# Patient Record
Sex: MALE | Race: ASIAN | Employment: UNEMPLOYED | URBAN - METROPOLITAN AREA
[De-identification: names, ages, dates, MRNs, and addresses within clinical notes are randomized per-mention and may not be internally consistent; named-entity substitution may affect disease eponyms.]

---

## 2017-11-27 ENCOUNTER — HOSPITAL ENCOUNTER (EMERGENCY)
Age: 57
Discharge: HOME | End: 2017-11-27
Attending: EMERGENCY MEDICINE
Payer: MEDICARE

## 2017-11-27 ENCOUNTER — APPOINTMENT (OUTPATIENT)
Dept: CT IMAGING | Age: 57
End: 2017-11-27
Attending: EMERGENCY MEDICINE
Payer: MEDICARE

## 2017-11-27 ENCOUNTER — APPOINTMENT (OUTPATIENT)
Dept: GENERAL RADIOLOGY | Age: 57
End: 2017-11-27
Attending: EMERGENCY MEDICINE
Payer: MEDICARE

## 2017-11-27 VITALS
RESPIRATION RATE: 26 BRPM | HEART RATE: 95 BPM | SYSTOLIC BLOOD PRESSURE: 115 MMHG | DIASTOLIC BLOOD PRESSURE: 95 MMHG | OXYGEN SATURATION: 95 % | TEMPERATURE: 97.8 F | BODY MASS INDEX: 29.59 KG/M2 | WEIGHT: 167 LBS | HEIGHT: 63 IN

## 2017-11-27 DIAGNOSIS — R06.02 SOB (SHORTNESS OF BREATH): Primary | ICD-10-CM

## 2017-11-27 LAB
ANION GAP SERPL CALC-SCNC: 12 MMOL/L (ref 3–18)
BASOPHILS # BLD: 0 K/UL (ref 0–0.06)
BASOPHILS NFR BLD: 0 % (ref 0–2)
BNP SERPL-MCNC: 4476 PG/ML (ref 0–900)
BUN SERPL-MCNC: 82 MG/DL (ref 7–18)
BUN/CREAT SERPL: 5 (ref 12–20)
CALCIUM SERPL-MCNC: 7.7 MG/DL (ref 8.5–10.1)
CHLORIDE SERPL-SCNC: 96 MMOL/L (ref 100–108)
CK MB CFR SERPL CALC: 0.5 % (ref 0–4)
CK MB SERPL-MCNC: 1.2 NG/ML (ref 5–25)
CK SERPL-CCNC: 235 U/L (ref 39–308)
CO2 SERPL-SCNC: 29 MMOL/L (ref 21–32)
CREAT SERPL-MCNC: 16.5 MG/DL (ref 0.6–1.3)
D DIMER PPP FEU-MCNC: 0.61 UG/ML(FEU)
DIFFERENTIAL METHOD BLD: ABNORMAL
EOSINOPHIL # BLD: 0.3 K/UL (ref 0–0.4)
EOSINOPHIL NFR BLD: 6 % (ref 0–5)
ERYTHROCYTE [DISTWIDTH] IN BLOOD BY AUTOMATED COUNT: 14.8 % (ref 11.6–14.5)
GLUCOSE SERPL-MCNC: 123 MG/DL (ref 74–99)
HCT VFR BLD AUTO: 30.1 % (ref 36–48)
HGB BLD-MCNC: 9.9 G/DL (ref 13–16)
LYMPHOCYTES # BLD: 1.5 K/UL (ref 0.9–3.6)
LYMPHOCYTES NFR BLD: 25 % (ref 21–52)
MCH RBC QN AUTO: 29.3 PG (ref 24–34)
MCHC RBC AUTO-ENTMCNC: 32.9 G/DL (ref 31–37)
MCV RBC AUTO: 89.1 FL (ref 74–97)
MONOCYTES # BLD: 0.4 K/UL (ref 0.05–1.2)
MONOCYTES NFR BLD: 7 % (ref 3–10)
NEUTS SEG # BLD: 3.7 K/UL (ref 1.8–8)
NEUTS SEG NFR BLD: 62 % (ref 40–73)
PLATELET # BLD AUTO: 149 K/UL (ref 135–420)
PMV BLD AUTO: 11.8 FL (ref 9.2–11.8)
POTASSIUM SERPL-SCNC: 5.1 MMOL/L (ref 3.5–5.5)
RBC # BLD AUTO: 3.38 M/UL (ref 4.7–5.5)
SODIUM SERPL-SCNC: 137 MMOL/L (ref 136–145)
TROPONIN I SERPL-MCNC: 0.07 NG/ML (ref 0–0.04)
WBC # BLD AUTO: 6 K/UL (ref 4.6–13.2)

## 2017-11-27 PROCEDURE — 99285 EMERGENCY DEPT VISIT HI MDM: CPT

## 2017-11-27 PROCEDURE — 71275 CT ANGIOGRAPHY CHEST: CPT

## 2017-11-27 PROCEDURE — 85025 COMPLETE CBC W/AUTO DIFF WBC: CPT | Performed by: EMERGENCY MEDICINE

## 2017-11-27 PROCEDURE — 93005 ELECTROCARDIOGRAM TRACING: CPT

## 2017-11-27 PROCEDURE — 71010 XR CHEST SNGL V: CPT

## 2017-11-27 PROCEDURE — 85379 FIBRIN DEGRADATION QUANT: CPT | Performed by: EMERGENCY MEDICINE

## 2017-11-27 PROCEDURE — 74011636320 HC RX REV CODE- 636/320: Performed by: EMERGENCY MEDICINE

## 2017-11-27 PROCEDURE — 96374 THER/PROPH/DIAG INJ IV PUSH: CPT

## 2017-11-27 PROCEDURE — 83880 ASSAY OF NATRIURETIC PEPTIDE: CPT | Performed by: EMERGENCY MEDICINE

## 2017-11-27 PROCEDURE — 74011000258 HC RX REV CODE- 258: Performed by: EMERGENCY MEDICINE

## 2017-11-27 PROCEDURE — 82550 ASSAY OF CK (CPK): CPT | Performed by: EMERGENCY MEDICINE

## 2017-11-27 PROCEDURE — 80048 BASIC METABOLIC PNL TOTAL CA: CPT | Performed by: EMERGENCY MEDICINE

## 2017-11-27 PROCEDURE — 74011250636 HC RX REV CODE- 250/636: Performed by: EMERGENCY MEDICINE

## 2017-11-27 RX ORDER — FUROSEMIDE 10 MG/ML
40 INJECTION INTRAMUSCULAR; INTRAVENOUS
Status: COMPLETED | OUTPATIENT
Start: 2017-11-27 | End: 2017-11-27

## 2017-11-27 RX ORDER — SODIUM CHLORIDE 9 MG/ML
100 INJECTION, SOLUTION INTRAVENOUS ONCE
Status: COMPLETED | OUTPATIENT
Start: 2017-11-27 | End: 2017-11-27

## 2017-11-27 RX ADMIN — IOPAMIDOL 70 ML: 755 INJECTION, SOLUTION INTRAVENOUS at 18:59

## 2017-11-27 RX ADMIN — SODIUM CHLORIDE 94 ML: 900 INJECTION, SOLUTION INTRAVENOUS at 18:59

## 2017-11-27 RX ADMIN — FUROSEMIDE 40 MG: 10 INJECTION, SOLUTION INTRAMUSCULAR; INTRAVENOUS at 20:15

## 2017-11-27 NOTE — ED PROVIDER NOTES
EMERGENCY DEPARTMENT HISTORY AND PHYSICAL EXAM    5:13 PM      Date: 11/27/2017  Patient Name: Merary Fragoso    History of Presenting Illness     Chief Complaint   Patient presents with    Chest Pain         History Provided By: Patient and translater    Chief Complaint: SOB  Duration:  Days  Timing:  Constant  Location: chest  Quality: Tightness  Severity: Mild  Modifying Factors: aggravated by deep breaths  Associated Symptoms: mild chest pain  HPI limited to language barrier, translater used      Additional History (Context): Merary Fragoso is a 62 y.o. male with diabetes, hypertension, stroke and CKD who presents with SOB. PT states he had a gradual onset of difficulty breathing starting at noon today with a little bit of chest pain. Deep breaths makes the SOB worse. PT had one prior episode of this and was seen at [facility] ED, pt unable to give answer if a cause for past episode was found. Pt has had 4 CVA's. PCP: Ashlee Jean Baptiste MD    Current Outpatient Prescriptions   Medication Sig Dispense Refill    oxyCODONE-acetaminophen (PERCOCET) 7.5-325 mg per tablet Take 1 Tab by mouth every four (4) hours as needed for Pain. Max Daily Amount: 6 Tabs. 40 Tab 0    chlorproMAZINE (THORAZINE) 10 mg tablet Take 10 mg by mouth three (3) times daily.  glimepiride (AMARYL) 1 mg tablet Take 1 mg by mouth Daily (before breakfast).  isosorbide dinitrate (ISORDIL) 30 mg tablet Take 30 mg by mouth daily.  nitroglycerin (NITROSTAT) 0.4 mg SL tablet by SubLINGual route every five (5) minutes as needed for Chest Pain.  sevelamer carbonate (RENVELA) 800 mg tab tab Take 800 mg by mouth three (3) times daily.  SENNOSIDES (SENNA LAX PO) Take  by mouth as needed.  linagliptin (TRADJENTA) 5 mg tablet Take 5 mg by mouth daily. Indications: type 2 diabetes mellitus      cholecalciferol, vitamin D3, (VITAMIN D3) 2,000 unit tab Take  by mouth daily.       amiodarone (CORDARONE) 200 mg tablet Take 200 mg by mouth daily.  warfarin (COUMADIN) 5 mg tablet Take 1 Tab by mouth daily. 30 Tab 11    metformin (GLUCOPHAGE) 1,000 mg tablet Take 1,000 mg by mouth two (2) times daily (with meals).  amlodipine-benazepril (LOTREL) 5-20 mg per capsule Take 1 Cap by mouth daily.  simvastatin (ZOCOR) 40 mg tablet Take  by mouth nightly.  metoprolol-XL (TOPROL XL) 100 mg XL tablet Take  by mouth daily.  aspirin delayed-release 81 mg tablet Take 81 mg by mouth daily.  fenofibrate nanocrystallized (TRICOR) 145 mg tablet Take  by mouth daily. Past History     Past Medical History:  Past Medical History:   Diagnosis Date    CAD (coronary artery disease)     Chronic kidney disease     Chronic pain     back    Diabetes ([facility] [address]     Hypercholesterolemia     Hypertension     Stroke [facility])        Past Surgical History:  Past Surgical History:   Procedure Laterality Date    CARDIAC SURG PROCEDURE UNLIST      angioplasty    HX GI  3-2008    EGD Normal    HX VASCULAR ACCESS      AV fistula       Family History:  Family History   Problem Relation Age of Onset    Heart Disease Mother     Hypertension Sister     Hypertension Brother        Social History:  Social History   Substance Use Topics    Smoking status: Current Every Day Smoker     Packs/day: 1.00     Years: 39.00    Smokeless tobacco: Never Used    Alcohol use No       Allergies:  No Known Allergies      Review of Systems       Review of Systems   Constitutional: Negative for chills and fever. HENT: Negative for rhinorrhea. Respiratory: Positive for shortness of breath. Cardiovascular: Positive for chest pain (mild). Gastrointestinal: Negative for abdominal pain, nausea and vomiting. Endocrine: Negative for polyuria. Genitourinary: Negative for dysuria. Musculoskeletal: Negative for back pain. Skin: Negative for rash. Neurological: Negative for headaches.    All other systems reviewed and are negative. Physical Exam     Visit Vitals    BP (!) 115/95    Pulse 95    Temp 97.8 °F (36.6 °C)    Resp 26    Ht 5' 3\" (1.6 m)    Wt 75.8 kg (167 lb)    SpO2 95%    BMI 29.58 kg/m2         Physical Exam   Constitutional: He is oriented to person, place, and time. He appears well-developed and well-nourished. Speaking in full sentences   HENT:   Head: Normocephalic and atraumatic. Eyes: Conjunctivae are normal. Pupils are equal, round, and reactive to light. Neck: Normal range of motion. Neck supple. Cardiovascular: Normal rate and regular rhythm. AV fistula left arm, palpable thrill   Pulmonary/Chest: Effort normal and breath sounds normal. No respiratory distress. He has no wheezes. Abdominal: Soft. Bowel sounds are normal. He exhibits no distension. There is no tenderness. There is no rebound and no guarding. Musculoskeletal: Normal range of motion. Neurological: He is alert and oriented to person, place, and time. Skin: Skin is warm and dry. Psychiatric: He has a normal mood and affect. Thought content normal.   Nursing note and vitals reviewed.         Diagnostic Study Results     Labs -  Recent Results (from the past 12 hour(s))   EKG, 12 LEAD, INITIAL    Collection Time: 11/27/17  4:42 PM   Result Value Ref Range    Ventricular Rate 95 BPM    Atrial Rate 95 BPM    P-R Interval 180 ms    QRS Duration 116 ms    Q-T Interval 386 ms    QTC Calculation (Bezet) 485 ms    Calculated P Axis 58 degrees    Calculated R Axis 16 degrees    Calculated T Axis 158 degrees    Diagnosis       Normal sinus rhythm  Possible Left atrial enlargement  Left ventricular hypertrophy with QRS widening and repolarization abnormality  Inferior infarct , age undetermined  Abnormal ECG  No previous ECGs available     CBC WITH AUTOMATED DIFF    Collection Time: 11/27/17  5:00 PM   Result Value Ref Range    WBC 6.0 4.6 - 13.2 K/uL    RBC 3.38 (L) 4.70 - 5.50 M/uL    HGB 9.9 (L) 13.0 - 16.0 g/dL    HCT 30.1 (L) 36.0 - 48.0 %    MCV 89.1 74.0 - 97.0 FL    MCH 29.3 24.0 - 34.0 PG    MCHC 32.9 31.0 - 37.0 g/dL    RDW 14.8 (H) 11.6 - 14.5 %    PLATELET 040 779 - 405 K/uL    MPV 11.8 9.2 - 11.8 FL    NEUTROPHILS 62 40 - 73 %    LYMPHOCYTES 25 21 - 52 %    MONOCYTES 7 3 - 10 %    EOSINOPHILS 6 (H) 0 - 5 %    BASOPHILS 0 0 - 2 %    ABS. NEUTROPHILS 3.7 1.8 - 8.0 K/UL    ABS. LYMPHOCYTES 1.5 0.9 - 3.6 K/UL    ABS. MONOCYTES 0.4 0.05 - 1.2 K/UL    ABS. EOSINOPHILS 0.3 0.0 - 0.4 K/UL    ABS.  BASOPHILS 0.0 0.0 - 0.06 K/UL    DF AUTOMATED     METABOLIC PANEL, BASIC    Collection Time: 11/27/17  5:00 PM   Result Value Ref Range    Sodium 137 136 - 145 mmol/L    Potassium 5.1 3.5 - 5.5 mmol/L    Chloride 96 (L) 100 - 108 mmol/L    CO2 29 21 - 32 mmol/L    Anion gap 12 3.0 - 18 mmol/L    Glucose 123 (H) 74 - 99 mg/dL    BUN 82 (H) 7.0 - 18 MG/DL    Creatinine 16.50 (H) 0.6 - 1.3 MG/DL    BUN/Creatinine ratio 5 (L) 12 - 20      GFR est AA 4 (L) >60 ml/min/1.73m2    GFR est non-AA 3 (L) >60 ml/min/1.73m2    Calcium 7.7 (L) 8.5 - 10.1 MG/DL   CARDIAC PANEL,(CK, CKMB & TROPONIN)    Collection Time: 11/27/17  5:00 PM   Result Value Ref Range     39 - 308 U/L    CK - MB 1.2 <3.6 ng/ml    CK-MB Index 0.5 0.0 - 4.0 %    Troponin-I, Qt. 0.07 (H) 0.0 - 0.045 NG/ML   NT-PRO BNP    Collection Time: 11/27/17  5:00 PM   Result Value Ref Range    NT pro-BNP 4476 (H) 0 - 900 PG/ML   D DIMER    Collection Time: 11/27/17  5:00 PM   Result Value Ref Range    D DIMER 0.61 (H) <0.46 ug/ml(FEU)   EKG, 12 LEAD, SUBSEQUENT    Collection Time: 11/27/17  8:36 PM   Result Value Ref Range    Ventricular Rate 90 BPM    Atrial Rate 90 BPM    P-R Interval 176 ms    QRS Duration 116 ms    Q-T Interval 390 ms    QTC Calculation (Bezet) 477 ms    Calculated P Axis 57 degrees    Calculated R Axis 29 degrees    Calculated T Axis 160 degrees    Diagnosis       Normal sinus rhythm  Possible Left atrial enlargement  Inferior infarct (cited on or before 27-NOV-2017)  ST & T wave abnormality, consider lateral ischemia  Abnormal ECG  When compared with ECG of 27-NOV-2017 16:42,  No significant change was found         Radiologic Studies -   XR CHEST SNGL V    (Results Pending)   CTA CHEST W OR W WO CONT    (Results Pending)         Medical Decision Making   I am the first provider for this patient. I reviewed the vital signs, available nursing notes, past medical history, past surgical history, family history and social history. Vital Signs-Reviewed the patient's vital signs. Cardiac Monitor:  Rate:   Rhythm:  Normal Sinus Rhythm     EKG: Interpreted by the EP. Time Interpreted: 16:42   Rate: 95   Rhythm: Normal Sinus Rhythm T wave inversions in I, AVL, V6, no STEMI   Interpretation:   Comparison:     Records Reviewed: Nursing Notes (Time of Review: 5:13 PM)      Patient seen for shortness of breath. CTA negative for PE. Patient has dialysis at 5 am tomorrow morning. Patient says he urinates 3-5 times a day still. GIven lasix and told patient that his vitals are reassuring and the best thing for him is to go to dialysis tomorrow [facility] at 5 am since that would be much sooner than if he stayed here and no emergent need to admit since vital signs and EKG reassuring. Diagnosis     Clinical Impression:   1. SOB (shortness of breath)        Disposition: Home     Follow-up Information     Follow up With Details Comments Sergio Malhotra MD In 1 day  [place] [id number]  [phone]             Patient's Medications   Start Taking    No medications on file   Continue Taking    AMIODARONE (CORDARONE) 200 MG TABLET    Take 200 mg by mouth daily. AMLODIPINE-BENAZEPRIL (LOTREL) 5-20 MG PER CAPSULE    Take 1 Cap by mouth daily. ASPIRIN DELAYED-RELEASE 81 MG TABLET    Take 81 mg by mouth daily.     CHLORPROMAZINE (THORAZINE) 10 MG TABLET    Take 10 mg by mouth three (3) times daily.    CHOLECALCIFEROL, VITAMIN D3, (VITAMIN D3) 2,000 UNIT TAB    Take  by mouth daily. FENOFIBRATE NANOCRYSTALLIZED (TRICOR) 145 MG TABLET    Take  by mouth daily. GLIMEPIRIDE (AMARYL) 1 MG TABLET    Take 1 mg by mouth Daily (before breakfast). ISOSORBIDE DINITRATE (ISORDIL) 30 MG TABLET    Take 30 mg by mouth daily. LINAGLIPTIN (TRADJENTA) 5 MG TABLET    Take 5 mg by mouth daily. Indications: type 2 diabetes mellitus    METFORMIN (GLUCOPHAGE) 1,000 MG TABLET    Take 1,000 mg by mouth two (2) times daily (with meals). METOPROLOL-XL (TOPROL XL) 100 MG XL TABLET    Take  by mouth daily. NITROGLYCERIN (NITROSTAT) 0.4 MG SL TABLET    by SubLINGual route every five (5) minutes as needed for Chest Pain. OXYCODONE-ACETAMINOPHEN (PERCOCET) 7.5-325 MG PER TABLET    Take 1 Tab by mouth every four (4) hours as needed for Pain. Max Daily Amount: 6 Tabs. SENNOSIDES (SENNA LAX PO)    Take  by mouth as needed. SEVELAMER CARBONATE (RENVELA) 800 MG TAB TAB    Take 800 mg by mouth three (3) times daily. SIMVASTATIN (ZOCOR) 40 MG TABLET    Take  by mouth nightly. WARFARIN (COUMADIN) 5 MG TABLET    Take 1 Tab by mouth daily. These Medications have changed    No medications on file   Stop Taking    No medications on file     _______________________________    Attestations:  Rahul Oliva MD acting as a scribe for and in the presence of Janes Nava MD      November 27, 2017 at 8:45 PM       Provider Attestation:      I personally performed the services described in the documentation, reviewed the documentation, as recorded by the scribe in my presence, and it accurately and completely records my words and actions.  November 27, 2017 at 8:45 PM - Janes Nava MD    _______________________________

## 2017-11-27 NOTE — ED TRIAGE NOTES
Pt arrived via rescue with c/o chest pain and shortness of breath. Pt has extensive cardiac history.  Rescue administered 324mg aspirin prior to arrival.

## 2017-11-28 LAB
ATRIAL RATE: 95 BPM
CALCULATED P AXIS, ECG09: 58 DEGREES
CALCULATED R AXIS, ECG10: 16 DEGREES
CALCULATED T AXIS, ECG11: 158 DEGREES
DIAGNOSIS, 93000: NORMAL
P-R INTERVAL, ECG05: 180 MS
Q-T INTERVAL, ECG07: 386 MS
QRS DURATION, ECG06: 116 MS
QTC CALCULATION (BEZET), ECG08: 485 MS
VENTRICULAR RATE, ECG03: 95 BPM

## 2017-11-28 NOTE — DISCHARGE INSTRUCTIONS
You were seen for shortness of breath. Your symptoms improved. We gave you one dose of Lasix and you have dialysis tomorrow morning. Return if you have any worsening symptoms. Shortness of Breath: Care Instructions  Your Care Instructions  Shortness of breath has many causes. Sometimes conditions such as anxiety can lead to shortness of breath. Some people get mild shortness of breath when they exercise. Trouble breathing also can be a symptom of a serious problem, such as asthma, lung disease, emphysema, heart problems, and pneumonia. If your shortness of breath continues, you may need tests and treatment. Watch for any changes in your breathing and other symptoms. Follow-up care is a key part of your treatment and safety. Be sure to make and go to all appointments, and call your doctor if you are having problems. It's also a good idea to know your test results and keep a list of the medicines you take. How can you care for yourself at home? · Do not smoke or allow others to smoke around you. If you need help quitting, talk to your doctor about stop-smoking programs and medicines. These can increase your chances of quitting for good. · Get plenty of rest and sleep. · Take your medicines exactly as prescribed. Call your doctor if you think you are having a problem with your medicine. · Find healthy ways to deal with stress. ¨ Exercise daily. ¨ Get plenty of sleep. ¨ Eat regularly and well. When should you call for help? Call 911 anytime you think you may need emergency care. For example, call if:  ? · You have severe shortness of breath. ? · You have symptoms of a heart attack. These may include:  ¨ Chest pain or pressure, or a strange feeling in the chest.  ¨ Sweating. ¨ Shortness of breath. ¨ Nausea or vomiting. ¨ Pain, pressure, or a strange feeling in the back, neck, jaw, or upper belly or in one or both shoulders or arms. ¨ Lightheadedness or sudden weakness.   ¨ A fast or irregular heartbeat. After you call 911, the  may tell you to chew 1 adult-strength or 2 to 4 low-dose aspirin. Wait for an ambulance. Do not try to drive yourself. ?Call your doctor now or seek immediate medical care if:  ? · Your shortness of breath gets worse or you start to wheeze. Wheezing is a high-pitched sound when you breathe. ? · You wake up at night out of breath or have to prop your head up on several pillows to breathe. ? · You are short of breath after only light activity or while at rest.   ? Watch closely for changes in your health, and be sure to contact your doctor if:  ? · You do not get better over the next 1 to 2 days. Where can you learn more? Go to http://stefania-nick.info/. Enter S780 in the search box to learn more about \"Shortness of Breath: Care Instructions. \"  Current as of: May 12, 2017  Content Version: 11.4  © 7145-2858 [employer]. Care instructions adapted under license by [employer] (which disclaims liability or warranty for this information). If you have questions about a medical condition or this instruction, always ask your healthcare professional. [place] [address] any warranty or liability for your use of this information.

## 2017-11-29 LAB
ATRIAL RATE: 90 BPM
CALCULATED P AXIS, ECG09: 57 DEGREES
CALCULATED R AXIS, ECG10: 29 DEGREES
CALCULATED T AXIS, ECG11: 160 DEGREES
DIAGNOSIS, 93000: NORMAL
P-R INTERVAL, ECG05: 176 MS
Q-T INTERVAL, ECG07: 390 MS
QRS DURATION, ECG06: 116 MS
QTC CALCULATION (BEZET), ECG08: 477 MS
VENTRICULAR RATE, ECG03: 90 BPM

## 2019-11-26 ENCOUNTER — APPOINTMENT (OUTPATIENT)
Dept: CT IMAGING | Age: 59
End: 2019-11-26
Attending: EMERGENCY MEDICINE
Payer: MEDICARE

## 2019-11-26 ENCOUNTER — HOSPITAL ENCOUNTER (EMERGENCY)
Age: 59
Discharge: HOME | End: 2019-11-26
Attending: EMERGENCY MEDICINE | Admitting: EMERGENCY MEDICINE
Payer: MEDICARE

## 2019-11-26 ENCOUNTER — APPOINTMENT (OUTPATIENT)
Dept: GENERAL RADIOLOGY | Age: 59
End: 2019-11-26
Attending: EMERGENCY MEDICINE
Payer: MEDICARE

## 2019-11-26 VITALS
HEART RATE: 94 BPM | SYSTOLIC BLOOD PRESSURE: 116 MMHG | DIASTOLIC BLOOD PRESSURE: 61 MMHG | TEMPERATURE: 97.9 F | OXYGEN SATURATION: 99 % | RESPIRATION RATE: 18 BRPM

## 2019-11-26 DIAGNOSIS — N19 UREMIA: Primary | ICD-10-CM

## 2019-11-26 DIAGNOSIS — G93.41 ENCEPHALOPATHY, METABOLIC: ICD-10-CM

## 2019-11-26 DIAGNOSIS — S92.421A CLOSED FRACTURE OF DISTAL PHALANX OF RIGHT GREAT TOE, INITIAL ENCOUNTER: ICD-10-CM

## 2019-11-26 LAB
ANION GAP SERPL CALC-SCNC: 14 MMOL/L (ref 3–18)
BUN SERPL-MCNC: 127 MG/DL (ref 7–18)
BUN/CREAT SERPL: 6 (ref 12–20)
CALCIUM SERPL-MCNC: 8.3 MG/DL (ref 8.5–10.1)
CHLORIDE SERPL-SCNC: 94 MMOL/L (ref 100–111)
CO2 SERPL-SCNC: 25 MMOL/L (ref 21–32)
CREAT SERPL-MCNC: 20.6 MG/DL (ref 0.6–1.3)
GLUCOSE SERPL-MCNC: 112 MG/DL (ref 74–99)
POTASSIUM SERPL-SCNC: 4.3 MMOL/L (ref 3.5–5.5)
SODIUM SERPL-SCNC: 133 MMOL/L (ref 136–145)

## 2019-11-26 PROCEDURE — 90935 HEMODIALYSIS ONE EVALUATION: CPT

## 2019-11-26 PROCEDURE — 73660 X-RAY EXAM OF TOE(S): CPT

## 2019-11-26 PROCEDURE — 99285 EMERGENCY DEPT VISIT HI MDM: CPT

## 2019-11-26 PROCEDURE — 74011250637 HC RX REV CODE- 250/637: Performed by: EMERGENCY MEDICINE

## 2019-11-26 PROCEDURE — 86706 HEP B SURFACE ANTIBODY: CPT

## 2019-11-26 PROCEDURE — 80048 BASIC METABOLIC PNL TOTAL CA: CPT

## 2019-11-26 PROCEDURE — 70450 CT HEAD/BRAIN W/O DYE: CPT

## 2019-11-26 PROCEDURE — 87340 HEPATITIS B SURFACE AG IA: CPT

## 2019-11-26 RX ORDER — HEPARIN SODIUM 5000 [USP'U]/ML
1000 INJECTION, SOLUTION INTRAVENOUS; SUBCUTANEOUS
Status: ACTIVE | OUTPATIENT
Start: 2019-11-26 | End: 2019-11-26

## 2019-11-26 RX ORDER — SODIUM CHLORIDE 9 MG/ML
25 INJECTION, SOLUTION INTRAVENOUS
Status: DISCONTINUED | OUTPATIENT
Start: 2019-11-26 | End: 2019-11-26 | Stop reason: HOSPADM

## 2019-11-26 RX ORDER — OXYCODONE HYDROCHLORIDE 5 MG/1
5 TABLET ORAL ONCE
Status: COMPLETED | OUTPATIENT
Start: 2019-11-26 | End: 2019-11-26

## 2019-11-26 RX ORDER — ACETAMINOPHEN 500 MG
1000 TABLET ORAL
Status: COMPLETED | OUTPATIENT
Start: 2019-11-26 | End: 2019-11-26

## 2019-11-26 RX ORDER — OXYCODONE HYDROCHLORIDE 5 MG/1
5 TABLET ORAL
Qty: 8 TAB | Refills: 0 | Status: SHIPPED | OUTPATIENT
Start: 2019-11-26 | End: 2019-12-01

## 2019-11-26 RX ORDER — ACETAMINOPHEN 500 MG
1000 TABLET ORAL
Qty: 30 TAB | Refills: 0 | Status: SHIPPED | OUTPATIENT
Start: 2019-11-26 | End: 2019-12-06

## 2019-11-26 RX ADMIN — OXYCODONE HYDROCHLORIDE 5 MG: 5 TABLET ORAL at 08:09

## 2019-11-26 RX ADMIN — ACETAMINOPHEN 1000 MG: 500 TABLET, FILM COATED ORAL at 08:09

## 2019-11-26 NOTE — ED PROVIDER NOTES
[address] [facility]  EMERGENCY DEPARTMENT HISTORY AND PHYSICAL EXAM       Date: 11/26/2019   Patient Name: Santiago Sawyer   YOB: 1960  Medical Record Number: [id number]    HISTORY OF PRESENTING ILLNESS:     Santiago Sawyer is a 61 y.o. male presenting with the noted PMH to the ED c/o moderate right toe pain with nail deformity onset this morning after a mechanical fall when he got up to go to dialysis. Patient has no other complaints of pain or concerns. Patient reports chronic pain of the right foot. On chart review he has a history of PVD with ischemic pain of the right foot. He also was recently seen at a outside facility for uremia and was dialyzed in the emergency department with discharge home afterwards 10 days ago. Primary Care Provider: Radha Beltre MD   Specialist:    Past Medical History:   Past Medical History:   Diagnosis Date    CAD (coronary artery disease)     Chronic kidney disease     Chronic pain     back    Diabetes ([facility] [address]     Hypercholesterolemia     Hypertension     Stroke [facility])         Past Surgical History:   Past Surgical History:   Procedure Laterality Date    CARDIAC SURG PROCEDURE UNLIST      angioplasty    HX GI  3-2008    EGD Normal    HX VASCULAR ACCESS      AV fistula        Social History:   Social History     Tobacco Use    Smoking status: Current Every Day Smoker     Packs/day: 1.00     Years: 39.00     Pack years: 39.00    Smokeless tobacco: Never Used   Substance Use Topics    Alcohol use: No    Drug use: No        Allergies:   No Known Allergies     REVIEW OF SYSTEMS:  Review of Systems   Constitutional: Negative for chills and fever. HENT: Negative for ear pain and sore throat. Eyes: Negative for pain and visual disturbance. Respiratory: Negative for cough and shortness of breath. Cardiovascular: Negative for chest pain and palpitations.    Gastrointestinal: Negative for abdominal pain, diarrhea, nausea and vomiting. Genitourinary: Negative for flank pain. Musculoskeletal: Positive for arthralgias. Negative for back pain and neck pain. Skin: Positive for wound (Abrasion to left knee). Neurological: Negative for syncope and headaches. Psychiatric/Behavioral: Negative for agitation. The patient is not nervous/anxious. PHYSICAL EXAM:  Vitals:    11/26/19 1445 11/26/19 1500 11/26/19 1515 11/26/19 1530   BP: 91/55 125/81 120/75 116/61   Pulse: 94 88 91 94   Resp: 19 20 20 18   Temp:       SpO2:           Physical Exam  Vitals signs and nursing note reviewed. Constitutional:       Appearance: He is well-developed. He is not ill-appearing, toxic-appearing or diaphoretic. Comments: Alert, laying down in bed, slightly restless when right toe is palpated. HENT:      Head: Normocephalic and atraumatic. Nose: Nose normal.   Eyes:      General: No scleral icterus. Extraocular Movements: Extraocular movements intact. Pupils: Pupils are equal, round, and reactive to light. Neck:      Musculoskeletal: Normal range of motion and neck supple. No muscular tenderness. Trachea: No tracheal deviation. Cardiovascular:      Rate and Rhythm: Normal rate and regular rhythm. Heart sounds: No murmur. Pulmonary:      Effort: Pulmonary effort is normal. No respiratory distress. Breath sounds: Normal breath sounds. Abdominal:      General: There is no distension. Palpations: Abdomen is soft. Tenderness: There is no tenderness. There is no guarding. Musculoskeletal: Normal range of motion. General: Tenderness and deformity (Avulsed right great toenail which is still connected at the nailbed) present. Skin:     General: Skin is warm and dry. Findings: No rash. Comments: Superficial less than dime sized abrasion to the left anterior knee. Dry gangrenous eschar to distal second toe and both great toes.  (consistent with prior podiatry note from 1 month ago)     Neurological:      General: No focal deficit present. Mental Status: He is alert and oriented to person, place, and time. Cranial Nerves: No cranial nerve deficit. Comments: No gross neuro deficit   Psychiatric:         Mood and Affect: Mood normal.         Medications   heparin (porcine) injection 1,000 Units (has no administration in time range)   acetaminophen (TYLENOL) tablet 1,000 mg (1,000 mg Oral Given 11/26/19 0809)   oxyCODONE IR (ROXICODONE) tablet 5 mg (5 mg Oral Given 11/26/19 0809)       RESULTS:    Labs -   Labs Reviewed   METABOLIC PANEL, BASIC - Abnormal; Notable for the following components:       Result Value    Sodium 133 (*)     Chloride 94 (*)     Glucose 112 (*)      (*)     Creatinine 20.60 (*)     BUN/Creatinine ratio 6 (*)     GFR est AA 3 (*)     GFR est non-AA 2 (*)     Calcium 8.3 (*)     All other components within normal limits   HEP B SURFACE AB   HEP B SURFACE AG       Radiologic Studies -  CT HEAD WO CONT   Final Result   IMPRESSION:         1. No acute intracranial abnormality. 2. Subcortical and periventricular white matter low-attenuation in keeping with   sequela of chronic ischemic microvascular change. 3. Evidence of prior infarcts as above. XR GREAT TOE RT MIN 2 V   Final Result   IMPRESSION:         1. Potential nondisplaced fracture of the right foot great toe distal phalanx. 2. Nailbed injury to the right foot great toe. 3. Areas of soft tissue ulceration involving the first and second toes, with   small areas of osseous erosion involving the second toe distal phalanx   suspicious for osteomyelitis.             MEDICAL DECISION MAKING    DDX: Nailbed injury, sprain, strain, fracture, contusion, abrasion, intracranial abnormality, uremia, electrolyte abnormality    61 y.o. male with noted past medical history who presented with fall at home and complaints of right greater toe pain which is acute on chronic in the setting of a history of chronic pain secondary to ischemia from PVD. Patient was recently seen at an outside emergency department and dialyzed for uremia after missing dialysis. Patient answers most questions appropriately and follows commands, he seems mildly confused but unsure if this is a language barrier secondary to uremia. Will check BMP, CT head and x-ray of his right greater toe to evaluate for fracture. Toe nail cleaned/irrigated. ED Course as of Nov 29 0543   Tue Nov 26, 2019   6292 Patient is uremic here today secondary to missing dialysis and likely the cause of his mild confusion. Will contact nephrology for urgent dialysis from the ED    [KG]   4478 Consulted Dr. Gela Kendall podiatry for recommendations concerning pt's dry gangrene with new nailbed injury and distal phalanx fx. [KG]   [id number] CT head negative for acute abnormality    [KG]   0927 Dr. Judie Weller consulted from nephrology who will set up dialysis for the patient with plan for discharge home afterwards. [KG]      ED Course User Index  [KG] Luis Garza R, DO     Pt back from dialysis, mental status is improved, pt has no complaints at this time. Podiatry recommends leaving toe nail in place, heard sole show and podiatry follow-up in 1 week. Patient has no new complaints, changes, or physical findings. Diagnostic studies if preformed were reviewed with the patient and/or family. All questions and concerns were addressed. Care plan was outlined, including follow-up with PCP/specialist and return precautions were discussed. Patient is felt to be stable for discharge at this time. Diagnosis   Clinical Impression:   1. Uremia    2. Encephalopathy, metabolic    3.  Closed fracture of distal phalanx of right great toe, initial encounter           Follow-up Information     Follow up With Specialties Details Why Contact Sherif Darling, DPM Podiatry Schedule an appointment as soon as possible for a visit in 1 week For Podiatry follow-up in one week for your broken right toe and nail injury  [address] EMERGENCY DEPT Emergency Medicine  If symptoms worsen [address]  [phone]          Discharge Medication List as of 11/26/2019  4:46 PM      START taking these medications    Details   acetaminophen (TYLENOL EXTRA STRENGTH) 500 mg tablet Take 2 Tabs by mouth every eight (8) hours as needed for Pain for up to 10 days. No more than 3,000 mg per day. Indications: pain, Print, Disp-30 Tab, R-0      oxyCODONE IR (ROXICODONE) 5 mg immediate release tablet Take 1 Tab by mouth every six (6) hours as needed for Pain for up to 5 days. Max Daily Amount: 20 mg., Normal, Disp-8 Tab, R-0         CONTINUE these medications which have NOT CHANGED    Details   chlorproMAZINE (THORAZINE) 10 mg tablet Take 10 mg by mouth three (3) times daily. , Historical Med      glimepiride (AMARYL) 1 mg tablet Take 1 mg by mouth Daily (before breakfast). , Historical Med      isosorbide dinitrate (ISORDIL) 30 mg tablet Take 30 mg by mouth daily. , Historical Med      nitroglycerin (NITROSTAT) 0.4 mg SL tablet by SubLINGual route every five (5) minutes as needed for Chest Pain., Historical Med      sevelamer carbonate (RENVELA) 800 mg tab tab Take 800 mg by mouth three (3) times daily. , Historical Med      SENNOSIDES (SENNA LAX PO) Take  by mouth as needed., Historical Med      linagliptin (TRADJENTA) 5 mg tablet Take 5 mg by mouth daily. Indications: type 2 diabetes mellitus, Historical Med      cholecalciferol, vitamin D3, (VITAMIN D3) 2,000 unit tab Take  by mouth daily. , Historical Med      amiodarone (CORDARONE) 200 mg tablet Take 200 mg by mouth daily. , Historical Med      warfarin (COUMADIN) 5 mg tablet Take 1 Tab by mouth daily. , Normal, Disp-30 Tab, R-11      metformin (GLUCOPHAGE) 1,000 mg tablet 1,000 mg, Oral, 2 TIMES DAILY WITH MEALS, Until Discontinued, Historical Med amlodipine-benazepril (LOTREL) 5-20 mg per capsule 1 Cap, Oral, DAILY, Until Discontinued, Historical Med      simvastatin (ZOCOR) 40 mg tablet Oral, EVERY BEDTIME, Until Discontinued, Historical Med      metoprolol-XL (TOPROL XL) 100 mg XL tablet Oral, DAILY, Until Discontinued, Historical Med      aspirin delayed-release 81 mg tablet 81 mg, Oral, DAILY, Until Discontinued, Historical Med      fenofibrate nanocrystallized (TRICOR) 145 mg tablet Oral, DAILY, Until Discontinued, Historical Med         STOP taking these medications       oxyCODONE-acetaminophen (PERCOCET) 7.5-325 mg per tablet Comments:   Reason for Stopping:               _______________________________   Attestations: No

## 2019-11-26 NOTE — ED TRIAGE NOTES
Patient arrived via EMS s/p fall. EMS stated that Karis Manuel was there to pick patient up for dialysis and heard a thud. Patient found on floor with abrasion on left knee. Upon arrival patient with abrasion to left knee. C/O pain in right foot. Right shoe and sock removed, right foot with discoloration in forefoot, toe nail on 1st digit is bent upward with dried blood on nail bed.

## 2019-11-26 NOTE — DISCHARGE INSTRUCTIONS
You had dialysis from the Emergency Department today because you were confused from a high BUN level from your End Stage Renal Disease. It is very important not to miss your dialysis appointments as this toxic waste product can build up in your blood due to your kidney failure if you do not have dialysis and can cause confusion like it did today. You are also seen by the podiatrist for a small nondisplaced fracture of your right great toe. Will need to wear a hard soled shoe while this fracture heals. Do not remove your toenail. Please clean the area as recommended by the podiatrist:  Recommend cleaning the area daily with dermal wound cleanser and covering with a band aid. Recommend ambulation in a post op shoe for fracture of the distal phalanx. Patient will need follow-up podiatry appointment in 1 week. Please return to the emergency department for worsening symptoms or any other concerns.

## 2019-11-26 NOTE — CONSULTS
Podiatry History and Physical    Subjective: Patient is a 61year old male with a PMHx of CAD,ESRD, DM, HTN, stroke, PVD with partially avulsed right hallux nail. Patient is poor historian. Per patients chart the patient fell this morning and bumped his right foot. Patient is complaining of right lower extremity pain. He denies any fever or chills. He has no other pedal complaints at this time. Date of Consultation:  November 26, 2019    Referring Physician: Dr. Amna Shelby  There are no active problems to display for this patient. Past Medical History:   Diagnosis Date    CAD (coronary artery disease)     Chronic kidney disease     Chronic pain     back    Diabetes ([facility] [address]     Hypercholesterolemia     Hypertension     Stroke ([facility] [address]       Family History   Problem Relation Age of Onset    Heart Disease Mother     Hypertension Sister     Hypertension Brother       Social History     Tobacco Use    Smoking status: Current Every Day Smoker     Packs/day: 1.00     Years: 39.00     Pack years: 39.00    Smokeless tobacco: Never Used   Substance Use Topics    Alcohol use: No     Past Surgical History:   Procedure Laterality Date    CARDIAC SURG PROCEDURE UNLIST      angioplasty    HX GI  3-2008    EGD Normal    HX VASCULAR ACCESS      AV fistula      Prior to Admission medications    Medication Sig Start Date End Date Taking? Authorizing Provider   oxyCODONE-acetaminophen (PERCOCET) 7.5-325 mg per tablet Take 1 Tab by mouth every four (4) hours as needed for Pain. Max Daily Amount: 6 Tabs. 8/28/17   Anatoly Higuera MD   chlorproMAZINE (THORAZINE) 10 mg tablet Take 10 mg by mouth three (3) times daily. Provider, Historical   glimepiride (AMARYL) 1 mg tablet Take 1 mg by mouth Daily (before breakfast). Provider, Historical   isosorbide dinitrate (ISORDIL) 30 mg tablet Take 30 mg by mouth daily.     Provider, Historical   nitroglycerin (NITROSTAT) 0.4 mg SL tablet by SubLINGual route every five (5) minutes as needed for Chest Pain. Provider, Historical   sevelamer carbonate (RENVELA) 800 mg tab tab Take 800 mg by mouth three (3) times daily. Provider, Historical   SENNOSIDES (SENNA LAX PO) Take  by mouth as needed. Provider, Historical   linagliptin (TRADJENTA) 5 mg tablet Take 5 mg by mouth daily. Indications: type 2 diabetes mellitus    Provider, Historical   cholecalciferol, vitamin D3, (VITAMIN D3) 2,000 unit tab Take  by mouth daily. Provider, Historical   amiodarone (CORDARONE) 200 mg tablet Take 200 mg by mouth daily. Provider, Historical   warfarin (COUMADIN) 5 mg tablet Take 1 Tab by mouth daily. 4/15/10   Raz Motley MD   metformin (GLUCOPHAGE) 1,000 mg tablet Take 1,000 mg by mouth two (2) times daily (with meals). Provider, Historical   amlodipine-benazepril (LOTREL) 5-20 mg per capsule Take 1 Cap by mouth daily. Provider, Historical   simvastatin (ZOCOR) 40 mg tablet Take  by mouth nightly. Provider, Historical   metoprolol-XL (TOPROL XL) 100 mg XL tablet Take  by mouth daily. Provider, Historical   aspirin delayed-release 81 mg tablet Take 81 mg by mouth daily. Provider, Historical   fenofibrate nanocrystallized (TRICOR) 145 mg tablet Take  by mouth daily.     Provider, Historical     No Known Allergies     Review of Systems:    Constitutional: negative  Eyes: negative  Ears, nose, mouth, throat, and face: negative  Respiratory: negative  Cardiovascular: negative  Gastrointestinal: negative  Hematologic/lymphatic: negative  Musculoskeletal:negative  Neurological: negative  Endocrine: negative      Objective:     Patient Vitals for the past 8 hrs:   BP Temp Pulse Resp SpO2   19 1056 128/78  76 15 99 %   19 0936 123/78  78 17 99 %   19 0730 137/75  96 23 99 %   19 0715 122/70  86 21 98 %   19 0708 141/86 97.9 °F (36.6 °C) 93 18 100 %   19 0700 (!) 159/92  95  99 %     Temp (24hrs), Av.9 °F (36.6 °C), Min:97.9 °F (36.6 °C), Max:97.9 °F (36.6 °C)      Data Review:   Recent Results (from the past 24 hour(s))   METABOLIC PANEL, BASIC    Collection Time: 11/26/19  7:59 AM   Result Value Ref Range    Sodium 133 (L) 136 - 145 mmol/L    Potassium 4.3 3.5 - 5.5 mmol/L    Chloride 94 (L) 100 - 111 mmol/L    CO2 25 21 - 32 mmol/L    Anion gap 14 3.0 - 18 mmol/L    Glucose 112 (H) 74 - 99 mg/dL     (H) 7.0 - 18 MG/DL    Creatinine 20.60 (H) 0.6 - 1.3 MG/DL    BUN/Creatinine ratio 6 (L) 12 - 20      GFR est AA 3 (L) >60 ml/min/1.73m2    GFR est non-AA 2 (L) >60 ml/min/1.73m2    Calcium 8.3 (L) 8.5 - 10.1 MG/DL     Foot Exam:  Vascular:   DP/PT pulses nonpalpable. CFT to digits b/l greater than three seconds. Skin temp warm to cool from proximal to distal.  Dry, stable gangrene present to the distal hallux bl with no evidence of infection noted. Neuro:   epicritic sensation diminished  Derm:   Right foot presents with onychomycotic nail present to the hallux with onycholysis present. Nail is intact and well adhered to the nail bed. Distal aspect of the first and second digit on the right foot with dry, stable gangrene present. Musculoskeltal:   no gross deformities noted. Impression:   PVD  Nondisplaced fracture of the distal phalanx of the right hallux    Recommendation:   Patient seen this afternoon in dialysis  Per patients chart, patient is a poor historian. Nail is well adhered to the underlying nail bed. Given the extent of his vascular insuffiencey we will hold off on full nail avulsion at this time as patient may not be able to heal it. Recommend cleaning the area daily with dermal wound cleanser and covering with a band aid. Recommend ambulation in a post op shoe for fracture of the distal phalanx  Ok to dc from our standpoint. He will need to follow up with his podiatrist one week following dc. Greater than 30 minutes was spent with the patient and all questions answered.

## 2019-11-26 NOTE — PROGRESS NOTES
Patient is known to our practice, dialyzes mwf at [facility] in [facility] under my care. Will arrange dialysis while awaiting podiatry evaluation for known rt foot dry gangrene. Will f/u in the hospital if admitted.

## 2019-11-27 LAB
HBV SURFACE AB SER QL IA: POSITIVE
HBV SURFACE AB SERPL IA-ACNC: 313.68 MIU/ML
HBV SURFACE AG SER QL: <0.1 INDEX
HBV SURFACE AG SER QL: NEGATIVE
HEP BS AB COMMENT,HBSAC: NORMAL

## 2019-12-24 ENCOUNTER — HOSPITAL ENCOUNTER (EMERGENCY)
Age: 59
Discharge: HOME | End: 2019-12-24
Attending: EMERGENCY MEDICINE
Payer: MEDICARE

## 2019-12-24 VITALS
WEIGHT: 134 LBS | TEMPERATURE: 99.2 F | BODY MASS INDEX: 23.74 KG/M2 | SYSTOLIC BLOOD PRESSURE: 116 MMHG | DIASTOLIC BLOOD PRESSURE: 46 MMHG | RESPIRATION RATE: 20 BRPM | HEART RATE: 85 BPM | OXYGEN SATURATION: 96 %
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
## 2019-12-24 DIAGNOSIS — G54.6 PHANTOM LIMB PAIN (HCC): Primary | ICD-10-CM

## 2019-12-24 LAB
ANION GAP BLD CALC-SCNC: 17 MMOL/L (ref 10–20)
BUN BLD-MCNC: 44 MG/DL (ref 7–18)
CA-I BLD-MCNC: 1.03 MMOL/L (ref 1.12–1.32)
CHLORIDE BLD-SCNC: 98 MMOL/L (ref 100–108)
CO2 BLD-SCNC: 26 MMOL/L (ref 19–24)
CREAT UR-MCNC: 11 MG/DL (ref 0.6–1.3)
GLUCOSE BLD STRIP.AUTO-MCNC: 124 MG/DL (ref 74–106)
HCT VFR BLD CALC: 30 % (ref 36–49)
HGB BLD-MCNC: 10.2 G/DL (ref 12–16)
POTASSIUM BLD-SCNC: 4.9 MMOL/L (ref 3.5–5.5)
SODIUM BLD-SCNC: 135 MMOL/L (ref 136–145)

## 2019-12-24 PROCEDURE — 80047 BASIC METABLC PNL IONIZED CA: CPT

## 2019-12-24 PROCEDURE — 99285 EMERGENCY DEPT VISIT HI MDM: CPT

## 2019-12-24 NOTE — DISCHARGE INSTRUCTIONS
Patient Education        Acute Pain After Surgery: Care Instructions  Your Care Instructions    It's common to have some pain after surgery. Pain doesn't mean that something is wrong or that the surgery didn't go well. But when the pain is severe, it's important to work with your doctor to manage it. It's also important to be aware of a few facts about pain and pain medicine. · You are the only person who knows what your pain feels like. So be sure to tell your doctor when you are in pain or when the pain changes. Then he or she will know how to adjust your medicines. · Pain is often easier to control right after it starts. So it may be better to take regular doses of pain medicine and not wait until the pain gets bad. · Medicine can help control pain. But this doesn't mean you'll have no pain. Medicine works to keep the pain at a level you can live with. With time, you will feel better. Follow-up care is a key part of your treatment and safety. Be sure to make and go to all appointments, and call your doctor if you are having problems. It's also a good idea to know your test results and keep a list of the medicines you take. How can you care for yourself at home? · Be safe with medicines. Read and follow all instructions on the label. ? If the doctor gave you a prescription medicine for pain, take it as prescribed. ? If you are not taking a prescription pain medicine, ask your doctor if you can take an over-the-counter medicine. · If you take an over-the-counter pain medicine, such as acetaminophen (Tylenol), ibuprofen (Advil, Motrin), or naproxen (Aleve), read and follow all instructions on the label. · Do not take two or more pain medicines at the same time unless the doctor told you to. · Do not drink alcohol while you are taking pain medicines. · Try to walk each day if your doctor recommends it. Start by walking a little more than you did the day before. Bit by bit, increase the amount you walk. Walking increases blood flow. It also helps prevent pneumonia and constipation. · To prevent constipation from opioid pain medicines:  ? Talk to your doctor about a laxative. ? Include fruits, vegetables, beans, and whole grains in your diet each day. These foods are high in fiber. ? Drink plenty of fluids, enough so that your urine is light yellow or clear like water. Drink water, fruit juice, or other drinks that do not contain caffeine or alcohol. If you have kidney, heart, or liver disease and have to limit fluids, talk with your doctor before you increase the amount of fluids you drink. ? Take a fiber supplement, such as Citrucel or Metamucil, every day if needed. Read and follow all instructions on the label. If you take pain medicine for more than a few days, talk to your doctor before you take fiber. When should you call for help? Call your doctor now or seek immediate medical care if:    · Your pain gets worse.     · Your pain is not controlled by medicine.    Watch closely for changes in your health, and be sure to contact your doctor if you have any problems. Where can you learn more? Go to http://stefania-nick.info/. Enter (17) 175-198 in the search box to learn more about \"Acute Pain After Surgery: Care Instructions. \"  Current as of: June 26, 2019  Content Version: 12.2  © 7639-9162 [employer]. Care instructions adapted under license by [employer] (which disclaims liability or warranty for this information). If you have questions about a medical condition or this instruction, always ask your healthcare professional. [place] [address] any warranty or liability for your use of this information.

## 2019-12-24 NOTE — ED NOTES
Pt discharged and transported back to [facility].   Report called to [facility] at facility and discharge instructions given to transport staff

## 2019-12-24 NOTE — ED NOTES
Spoke with Van Shaver charge nurse at [facility] and she states patient had unwitnessed fall twice today, and patient was found on the floor. Per charge nurse Ming High, patient gets dialysis at [facility] and cannot be schedules today because there is no transportation because of the holiday sched.

## 2019-12-24 NOTE — ED TRIAGE NOTES
Pt arrived via EMS from [facility] for fall times 2 since yesterday.   Staff also states the patient is refusing medications and did not have dialysis today due to falls

## 2019-12-24 NOTE — ED PROVIDER NOTES
EMERGENCY DEPARTMENT HISTORY AND PHYSICAL EXAM    10:21 AM      Date: 12/24/2019  Patient Name: Claudene Drone    History of Presenting Illness     Chief Complaint   Patient presents with    Fall         History Provided By: Patient and EMS    Chief Complaint: fall  Duration:  Seconds  Timing:  Acute  Location: R foot pain  Quality: Stabbing  Severity: Mild  Modifying Factors: none  Associated Symptoms: denies any other associated signs or symptoms      Additional History (Context): Claudene Drone is a 61 y.o. male with diabetes, hypertension and ESRD on HD who presents with 2 falls. Patient has a history of ESRD on HD, status post right BKA and admission for gangrene, was just discharged from [facility] to [address] [place] [address] yesterday. Reportedly today had had 2 falls so the transport company refused to take him to dialysis. Patient is complaining only of pain in his right foot (which has been amputated). .  Denies any headache. Denies any chest pain or shortness of breath. Is not on anticoagulation. No other complaints. Although patient denies any complaints at this time. He also is denying any falls today. PCP: Huang Keller MD    Current Outpatient Medications   Medication Sig Dispense Refill    chlorproMAZINE (THORAZINE) 10 mg tablet Take 10 mg by mouth three (3) times daily.  glimepiride (AMARYL) 1 mg tablet Take 1 mg by mouth Daily (before breakfast).  isosorbide dinitrate (ISORDIL) 30 mg tablet Take 30 mg by mouth daily.  nitroglycerin (NITROSTAT) 0.4 mg SL tablet by SubLINGual route every five (5) minutes as needed for Chest Pain.  sevelamer carbonate (RENVELA) 800 mg tab tab Take 800 mg by mouth three (3) times daily.  SENNOSIDES (SENNA LAX PO) Take  by mouth as needed.  linagliptin (TRADJENTA) 5 mg tablet Take 5 mg by mouth daily.  Indications: type 2 diabetes mellitus      cholecalciferol, vitamin D3, (VITAMIN D3) 2,000 unit tab Take  by mouth daily.      amiodarone (CORDARONE) 200 mg tablet Take 200 mg by mouth daily.  warfarin (COUMADIN) 5 mg tablet Take 1 Tab by mouth daily. 30 Tab 11    metformin (GLUCOPHAGE) 1,000 mg tablet Take 1,000 mg by mouth two (2) times daily (with meals).  amlodipine-benazepril (LOTREL) 5-20 mg per capsule Take 1 Cap by mouth daily.  simvastatin (ZOCOR) 40 mg tablet Take  by mouth nightly.  metoprolol-XL (TOPROL XL) 100 mg XL tablet Take  by mouth daily.  aspirin delayed-release 81 mg tablet Take 81 mg by mouth daily.  fenofibrate nanocrystallized (TRICOR) 145 mg tablet Take  by mouth daily. Past History     Past Medical History:  Past Medical History:   Diagnosis Date    CAD (coronary artery disease)     Chronic kidney disease     Chronic pain     back    Diabetes ([address]     Hypercholesterolemia     Hypertension     Stroke [facility])        Past Surgical History:  Past Surgical History:   Procedure Laterality Date    CARDIAC SURG PROCEDURE UNLIST      angioplasty    HX GI  3-2008    EGD Normal    HX VASCULAR ACCESS      AV fistula       Family History:  Family History   Problem Relation Age of Onset    Heart Disease Mother     Hypertension Sister     Hypertension Brother        Social History:  Social History     Tobacco Use    Smoking status: Current Every Day Smoker     Packs/day: 1.00     Years: 39.00     Pack years: 39.00    Smokeless tobacco: Never Used   Substance Use Topics    Alcohol use: No    Drug use: No       Allergies:  No Known Allergies      Review of Systems       Review of Systems   Constitutional: Negative for fever. Respiratory: Negative for shortness of breath. Cardiovascular: Negative for chest pain. Musculoskeletal: Positive for arthralgias. Neurological: Negative for headaches. All other systems reviewed and are negative.         Physical Exam     Visit Vitals  /71   Pulse 86   Temp 99.2 °F (37.3 °C)   Resp 18   Wt 60.8 kg (134 lb) SpO2 96%   BMI 23.74 kg/m²         Physical Exam  Constitutional:       General: He is not in acute distress. Appearance: He is well-developed. He is not ill-appearing. HENT:      Head: Normocephalic and atraumatic. Neck:      Musculoskeletal: Neck supple. Vascular: No JVD. Cardiovascular:      Rate and Rhythm: Normal rate and regular rhythm. Pulmonary:      Effort: Pulmonary effort is normal. No respiratory distress. Breath sounds: Normal breath sounds. Abdominal:      General: There is no distension. Palpations: Abdomen is soft. Tenderness: There is no tenderness. There is no guarding or rebound. Musculoskeletal:      Comments: LUE, fistula with palpable thrill, right BKA, full range of motion in the left lower extremity, no joint tenderness   Skin:     General: Skin is warm and dry. Findings: No erythema. Neurological:      Mental Status: He is alert and oriented to person, place, and time. Psychiatric:         Judgment: Judgment normal.           Diagnostic Study Results     Labs -  Recent Results (from the past 12 hour(s))   POC CHEM8    Collection Time: 12/24/19 11:08 AM   Result Value Ref Range    CO2, POC 26 (H) 19 - 24 MMOL/L    Glucose,  (H) 74 - 106 MG/DL    BUN, POC 44 (H) 7 - 18 MG/DL    Creatinine, POC 11.0 (H) 0.6 - 1.3 MG/DL    GFRAA, POC 6 (L) >60 ml/min/1.73m2    GFRNA, POC 5 (L) >60 ml/min/1.73m2    Sodium,  (L) 136 - 145 MMOL/L    Potassium, POC 4.9 3.5 - 5.5 MMOL/L    Calcium, ionized (POC) 1.03 (L) 1.12 - 1.32 mmol/L    Chloride, POC 98 (L) 100 - 108 MMOL/L    Anion gap, POC 17 10 - 20      Hematocrit, POC 30 (L) 36 - 49 %    Hemoglobin, POC 10.2 (L) 12 - 16 G/DL       Radiologic Studies -   No orders to display         Medical Decision Making   I am the first provider for this patient. I reviewed the vital signs, available nursing notes, past medical history, past surgical history, family history and social history.     Vital Signs-Reviewed the patient's vital signs. Pulse Oximetry Analysis -  96 on room air (Interpretation)nl     Cardiac Monitor:  Rate: 86  Rhythm:  Normal Sinus Rhythm         Records Reviewed: Nursing Notes and Old Medical Records (Time of Review: 10:21 AM)    ED Course: Progress Notes, Reevaluation, and Consults:    Provider Notes (Medical Decision Making): 59-year-old male presenting from [facility] for evaluation of questionably 2 falls although patient is denying this. He is no obvious signs of any injury. Is not on anticoagulation, and normal neuro exams no indication for head imaging. Will check a Chem-8 to see if he needs emergent dialysis. At this time does not appear volume overloaded. 11:40 AM  Patient's nurse spoke with [facility] who stated patient had 2 unwitnessed falls and was found on the floor next to his bed. They state he did not have transport to take him to dialysis today. We will have the nurse contact for sinuous dialysis to see if patient can still be gone there for dialysis today or if he needs to just follow-up on Thursday. Diagnosis     Clinical Impression:   1. Phantom limb pain ([address]    2. Fall, initial encounter        Disposition: discharged    Follow-up Information     Follow up With Specialties Details Why Contact Info    Judson Ty MD Internal Medicine Schedule an appointment as soon as possible for a visit in 1 week  [address] [facility]      [facility] EMERGENCY DEPT Emergency Medicine  As needed, If symptoms worsen [address]  [phone]           Patient's Medications   Start Taking    No medications on file   Continue Taking    AMIODARONE (CORDARONE) 200 MG TABLET    Take 200 mg by mouth daily. AMLODIPINE-BENAZEPRIL (LOTREL) 5-20 MG PER CAPSULE    Take 1 Cap by mouth daily. ASPIRIN DELAYED-RELEASE 81 MG TABLET    Take 81 mg by mouth daily.     CHLORPROMAZINE (THORAZINE) 10 MG TABLET    Take 10 mg by mouth three (3) times daily. CHOLECALCIFEROL, VITAMIN D3, (VITAMIN D3) 2,000 UNIT TAB    Take  by mouth daily. FENOFIBRATE NANOCRYSTALLIZED (TRICOR) 145 MG TABLET    Take  by mouth daily. GLIMEPIRIDE (AMARYL) 1 MG TABLET    Take 1 mg by mouth Daily (before breakfast). ISOSORBIDE DINITRATE (ISORDIL) 30 MG TABLET    Take 30 mg by mouth daily. LINAGLIPTIN (TRADJENTA) 5 MG TABLET    Take 5 mg by mouth daily. Indications: type 2 diabetes mellitus    METFORMIN (GLUCOPHAGE) 1,000 MG TABLET    Take 1,000 mg by mouth two (2) times daily (with meals). METOPROLOL-XL (TOPROL XL) 100 MG XL TABLET    Take  by mouth daily. NITROGLYCERIN (NITROSTAT) 0.4 MG SL TABLET    by SubLINGual route every five (5) minutes as needed for Chest Pain. SENNOSIDES (SENNA LAX PO)    Take  by mouth as needed. SEVELAMER CARBONATE (RENVELA) 800 MG TAB TAB    Take 800 mg by mouth three (3) times daily. SIMVASTATIN (ZOCOR) 40 MG TABLET    Take  by mouth nightly. WARFARIN (COUMADIN) 5 MG TABLET    Take 1 Tab by mouth daily.    These Medications have changed    No medications on file   Stop Taking    No medications on file     _______________________________

## 2019-12-30 ENCOUNTER — APPOINTMENT (OUTPATIENT)
Dept: GENERAL RADIOLOGY | Age: 59
DRG: 280 | End: 2019-12-30
Attending: EMERGENCY MEDICINE
Payer: MEDICARE

## 2019-12-30 ENCOUNTER — HOSPITAL ENCOUNTER (EMERGENCY)
Age: 59
Discharge: HOME | DRG: 280 | End: 2019-12-30
Attending: EMERGENCY MEDICINE
Payer: MEDICARE

## 2019-12-30 VITALS
SYSTOLIC BLOOD PRESSURE: 113 MMHG | TEMPERATURE: 98.2 F | HEART RATE: 85 BPM | BODY MASS INDEX: 23.74 KG/M2 | RESPIRATION RATE: 18 BRPM | OXYGEN SATURATION: 100 % | WEIGHT: 134 LBS | DIASTOLIC BLOOD PRESSURE: 68 MMHG

## 2019-12-30 DIAGNOSIS — N18.6 ESRD (END STAGE RENAL DISEASE) ON DIALYSIS (HCC): ICD-10-CM

## 2019-12-30 DIAGNOSIS — T87.43 INFECTION OF RIGHT BELOW KNEE AMPUTATION (HCC): Primary | ICD-10-CM

## 2019-12-30 DIAGNOSIS — Z99.2 ESRD (END STAGE RENAL DISEASE) ON DIALYSIS (HCC): ICD-10-CM

## 2019-12-30 LAB
ALBUMIN SERPL-MCNC: 2.7 G/DL (ref 3.4–5)
ALBUMIN/GLOB SERPL: 0.6 {RATIO} (ref 0.8–1.7)
ALP SERPL-CCNC: 83 U/L (ref 45–117)
ALT SERPL-CCNC: 23 U/L (ref 16–61)
ANION GAP SERPL CALC-SCNC: 14 MMOL/L (ref 3–18)
AST SERPL-CCNC: 24 U/L (ref 10–38)
BASOPHILS # BLD: 0 K/UL (ref 0–0.1)
BASOPHILS NFR BLD: 0 % (ref 0–2)
BILIRUB SERPL-MCNC: 0.3 MG/DL (ref 0.2–1)
BUN SERPL-MCNC: 82 MG/DL (ref 7–18)
BUN/CREAT SERPL: 5 (ref 12–20)
CALCIUM SERPL-MCNC: 8.2 MG/DL (ref 8.5–10.1)
CHLORIDE SERPL-SCNC: 96 MMOL/L (ref 100–111)
CO2 SERPL-SCNC: 23 MMOL/L (ref 21–32)
CREAT SERPL-MCNC: 15.7 MG/DL (ref 0.6–1.3)
DIFFERENTIAL METHOD BLD: ABNORMAL
EOSINOPHIL # BLD: 0.4 K/UL (ref 0–0.4)
EOSINOPHIL NFR BLD: 3 % (ref 0–5)
ERYTHROCYTE [DISTWIDTH] IN BLOOD BY AUTOMATED COUNT: 16 % (ref 11.6–14.5)
GLOBULIN SER CALC-MCNC: 4.6 G/DL (ref 2–4)
GLUCOSE SERPL-MCNC: 97 MG/DL (ref 74–99)
HCT VFR BLD AUTO: 25.3 % (ref 36–48)
HGB BLD-MCNC: 7.8 G/DL (ref 13–16)
INR PPP: 1.1 (ref 0.8–1.2)
LYMPHOCYTES # BLD: 1.2 K/UL (ref 0.9–3.6)
LYMPHOCYTES NFR BLD: 10 % (ref 21–52)
MCH RBC QN AUTO: 27.7 PG (ref 24–34)
MCHC RBC AUTO-ENTMCNC: 30.8 G/DL (ref 31–37)
MCV RBC AUTO: 89.7 FL (ref 74–97)
MONOCYTES # BLD: 0.4 K/UL (ref 0.05–1.2)
MONOCYTES NFR BLD: 3 % (ref 3–10)
NEUTS SEG # BLD: 9.9 K/UL (ref 1.8–8)
NEUTS SEG NFR BLD: 84 % (ref 40–73)
PLATELET # BLD AUTO: 472 K/UL (ref 135–420)
PMV BLD AUTO: 9.3 FL (ref 9.2–11.8)
POTASSIUM SERPL-SCNC: 4.4 MMOL/L (ref 3.5–5.5)
PROT SERPL-MCNC: 7.3 G/DL (ref 6.4–8.2)
PROTHROMBIN TIME: 13.6 SEC (ref 11.5–15.2)
RBC # BLD AUTO: 2.82 M/UL (ref 4.7–5.5)
SODIUM SERPL-SCNC: 133 MMOL/L (ref 136–145)
WBC # BLD AUTO: 11.9 K/UL (ref 4.6–13.2)

## 2019-12-30 PROCEDURE — 74011250637 HC RX REV CODE- 250/637: Performed by: EMERGENCY MEDICINE

## 2019-12-30 PROCEDURE — 99284 EMERGENCY DEPT VISIT MOD MDM: CPT

## 2019-12-30 PROCEDURE — 73560 X-RAY EXAM OF KNEE 1 OR 2: CPT

## 2019-12-30 PROCEDURE — 80053 COMPREHEN METABOLIC PANEL: CPT

## 2019-12-30 PROCEDURE — 85610 PROTHROMBIN TIME: CPT

## 2019-12-30 PROCEDURE — 85025 COMPLETE CBC W/AUTO DIFF WBC: CPT

## 2019-12-30 PROCEDURE — 96375 TX/PRO/DX INJ NEW DRUG ADDON: CPT

## 2019-12-30 PROCEDURE — 74011250636 HC RX REV CODE- 250/636: Performed by: EMERGENCY MEDICINE

## 2019-12-30 PROCEDURE — 96374 THER/PROPH/DIAG INJ IV PUSH: CPT

## 2019-12-30 RX ORDER — ONDANSETRON 2 MG/ML
4 INJECTION INTRAMUSCULAR; INTRAVENOUS
Status: COMPLETED | OUTPATIENT
Start: 2019-12-30 | End: 2019-12-30

## 2019-12-30 RX ORDER — DOXYCYCLINE 100 MG/1
100 CAPSULE ORAL 2 TIMES DAILY
Qty: 20 CAP | Refills: 0 | Status: ON HOLD | OUTPATIENT
Start: 2019-12-30 | End: 2020-01-03

## 2019-12-30 RX ORDER — MORPHINE SULFATE 2 MG/ML
2 INJECTION, SOLUTION INTRAMUSCULAR; INTRAVENOUS
Status: COMPLETED | OUTPATIENT
Start: 2019-12-30 | End: 2019-12-30

## 2019-12-30 RX ORDER — DOXYCYCLINE 100 MG/1
100 CAPSULE ORAL
Status: COMPLETED | OUTPATIENT
Start: 2019-12-30 | End: 2019-12-30

## 2019-12-30 RX ORDER — OXYCODONE AND ACETAMINOPHEN 5; 325 MG/1; MG/1
1 TABLET ORAL
Status: COMPLETED | OUTPATIENT
Start: 2019-12-30 | End: 2019-12-30

## 2019-12-30 RX ADMIN — DOXYCYCLINE 100 MG: 100 CAPSULE ORAL at 15:14

## 2019-12-30 RX ADMIN — MORPHINE SULFATE 2 MG: 2 INJECTION, SOLUTION INTRAMUSCULAR; INTRAVENOUS at 14:18

## 2019-12-30 RX ADMIN — ONDANSETRON 4 MG: 2 INJECTION INTRAMUSCULAR; INTRAVENOUS at 14:17

## 2019-12-30 RX ADMIN — OXYCODONE HYDROCHLORIDE AND ACETAMINOPHEN 1 TABLET: 5; 325 TABLET ORAL at 13:54

## 2019-12-30 NOTE — ED TRIAGE NOTES
Pt arrived via EMS from [facility] with a complaint of right knee pain. Pt has a history of dementia and has a recent right BKA.

## 2019-12-30 NOTE — ED PROVIDER NOTES
EMERGENCY DEPARTMENT HISTORY AND PHYSICAL EXAM    11:55 AM      Date: 12/30/2019  Patient Name: Olivier James    History of Presenting Illness     Chief Complaint   Patient presents with    Knee Pain         HISTORY: Olivier James is a 61 y.o. male with medical history as listed below who presents with right lower extremity pain that is been present for 1 week. Patient had a below-knee amputation through [facility] on December 11. He is currently at [facility]. Per EMS report, he was combative with staff and did state he wanted to hurt himself. However there is a language barrier as patient primarily speaks Tagalog and at baseline does have some confusion. Upon EMS arrival patient denies any suicidal thoughts. RN Libia Kim used to obtain history in [place] and pt denies suicidal thoughts. History remains limited despite use of  as patient is a poor historian. He is unsure when he is supposed to see his surgeon for a follow-up appointment. He is unsure if he is receiving medications for pain. He is unsure of any drainage from the wound. Denies pain elsewhere. PCP: Nik Santiago MD    Current Outpatient Medications   Medication Sig Dispense Refill    doxycycline (MONODOX) 100 mg capsule Take 1 Cap by mouth two (2) times a day for 10 days. 20 Cap 0    chlorproMAZINE (THORAZINE) 10 mg tablet Take 10 mg by mouth three (3) times daily.  glimepiride (AMARYL) 1 mg tablet Take 1 mg by mouth Daily (before breakfast).  isosorbide dinitrate (ISORDIL) 30 mg tablet Take 30 mg by mouth daily.  nitroglycerin (NITROSTAT) 0.4 mg SL tablet by SubLINGual route every five (5) minutes as needed for Chest Pain.  sevelamer carbonate (RENVELA) 800 mg tab tab Take 800 mg by mouth three (3) times daily.  SENNOSIDES (SENNA LAX PO) Take  by mouth as needed.  linagliptin (TRADJENTA) 5 mg tablet Take 5 mg by mouth daily.  Indications: type 2 diabetes mellitus      cholecalciferol, vitamin D3, (VITAMIN D3) 2,000 unit tab Take  by mouth daily.  amiodarone (CORDARONE) 200 mg tablet Take 200 mg by mouth daily.  warfarin (COUMADIN) 5 mg tablet Take 1 Tab by mouth daily. 30 Tab 11    metformin (GLUCOPHAGE) 1,000 mg tablet Take 1,000 mg by mouth two (2) times daily (with meals).  amlodipine-benazepril (LOTREL) 5-20 mg per capsule Take 1 Cap by mouth daily.  simvastatin (ZOCOR) 40 mg tablet Take  by mouth nightly.  metoprolol-XL (TOPROL XL) 100 mg XL tablet Take  by mouth daily.  aspirin delayed-release 81 mg tablet Take 81 mg by mouth daily.  fenofibrate nanocrystallized (TRICOR) 145 mg tablet Take  by mouth daily.          Past History     Past Medical History:  Past Medical History:   Diagnosis Date    CAD (coronary artery disease)     Chronic kidney disease     Chronic pain     back    Diabetes ([address]     Hypercholesterolemia     Hypertension     Stroke [facility])        Past Surgical History:  Past Surgical History:   Procedure Laterality Date    CARDIAC SURG PROCEDURE UNLIST      angioplasty    HX GI  3-2008    EGD Normal    HX VASCULAR ACCESS      AV fistula       Family History:  Family History   Problem Relation Age of Onset    Heart Disease Mother     Hypertension Sister     Hypertension Brother        Social History:  Social History     Tobacco Use    Smoking status: Current Every Day Smoker     Packs/day: 1.00     Years: 39.00     Pack years: 39.00    Smokeless tobacco: Never Used   Substance Use Topics    Alcohol use: No    Drug use: No       Allergies:  No Known Allergies      Review of Systems       Review of Systems   Unable to perform ROS: Dementia (Vascular dementia/confusion at baseline)         Physical Exam     Visit Vitals  /68   Pulse 85   Temp 98.2 °F (36.8 °C)   Resp 18   Wt 60.8 kg (134 lb)   SpO2 100%   BMI 23.74 kg/m²         Physical Exam  General Exam: Patient is a well developed and well nourished in no distress. Patient does not appear acutely ill or toxic. Chronically ill-appearing. Eye Exam: Lids and conjunctiva are normal  ENT Exam: The general head and facial exam is normal.  The neck is supple without meningeal signs. No significant adenopathy. Pulmonary Exam: No respiratory distress. The respiratory rate is normal.  No stridor. The breath sounds are equal bilaterally. There are no wheezes, rales, or rhonchi noted. Cardiac Exam: The cardiac rate and rhythm are normal.  No significant murmurs, rubs, or gallops. The peripheral pulses of the upper extremities are normal.  Abdominal Exam: Abdomen is soft and non-distended. No pulsatile masses. There is no local tenderness. There is no rebound or guarding noted. Musculoskeletal Exam: R BKA; staples in place, bloody drainage from wound - no signs of purulence, slight surrounding erythema and tenderness, LUE fistula with palpable thrill  Psychiatric: Normal adult with appropriate demeanor and interpersonal interaction. Is oriented to person, place, and time. Diagnostic Study Results     Labs -  Recent Results (from the past 12 hour(s))   CBC WITH AUTOMATED DIFF    Collection Time: 12/30/19 12:10 PM   Result Value Ref Range    WBC 11.9 4.6 - 13.2 K/uL    RBC 2.82 (L) 4.70 - 5.50 M/uL    HGB 7.8 (L) 13.0 - 16.0 g/dL    HCT 25.3 (L) 36.0 - 48.0 %    MCV 89.7 74.0 - 97.0 FL    MCH 27.7 24.0 - 34.0 PG    MCHC 30.8 (L) 31.0 - 37.0 g/dL    RDW 16.0 (H) 11.6 - 14.5 %    PLATELET 112 (H) 047 - 420 K/uL    MPV 9.3 9.2 - 11.8 FL    NEUTROPHILS 84 (H) 40 - 73 %    LYMPHOCYTES 10 (L) 21 - 52 %    MONOCYTES 3 3 - 10 %    EOSINOPHILS 3 0 - 5 %    BASOPHILS 0 0 - 2 %    ABS. NEUTROPHILS 9.9 (H) 1.8 - 8.0 K/UL    ABS. LYMPHOCYTES 1.2 0.9 - 3.6 K/UL    ABS. MONOCYTES 0.4 0.05 - 1.2 K/UL    ABS. EOSINOPHILS 0.4 0.0 - 0.4 K/UL    ABS.  BASOPHILS 0.0 0.0 - 0.1 K/UL    DF AUTOMATED     METABOLIC PANEL, COMPREHENSIVE    Collection Time: 12/30/19 12:10 PM   Result Value Ref Range    Sodium 133 (L) 136 - 145 mmol/L    Potassium 4.4 3.5 - 5.5 mmol/L    Chloride 96 (L) 100 - 111 mmol/L    CO2 23 21 - 32 mmol/L    Anion gap 14 3.0 - 18 mmol/L    Glucose 97 74 - 99 mg/dL    BUN 82 (H) 7.0 - 18 MG/DL    Creatinine 15.70 (H) 0.6 - 1.3 MG/DL    BUN/Creatinine ratio 5 (L) 12 - 20      GFR est AA 4 (L) >60 ml/min/1.73m2    GFR est non-AA 3 (L) >60 ml/min/1.73m2    Calcium 8.2 (L) 8.5 - 10.1 MG/DL    Bilirubin, total 0.3 0.2 - 1.0 MG/DL    ALT (SGPT) 23 16 - 61 U/L    AST (SGOT) 24 10 - 38 U/L    Alk. phosphatase 83 45 - 117 U/L    Protein, total 7.3 6.4 - 8.2 g/dL    Albumin 2.7 (L) 3.4 - 5.0 g/dL    Globulin 4.6 (H) 2.0 - 4.0 g/dL    A-G Ratio 0.6 (L) 0.8 - 1.7     PROTHROMBIN TIME + INR    Collection Time: 12/30/19 12:10 PM   Result Value Ref Range    Prothrombin time 13.6 11.5 - 15.2 sec    INR 1.1 0.8 - 1.2         Radiologic Studies -   XR KNEE RT MAX 2 VWS   Final Result   IMPRESSION:         1. Status post below the knee amputation on the right. No evidence of   osteomyelitis. No definite soft tissue gas. 2. Small effusion. 3. Atherosclerosis. Medical Decision Making   I am the first provider for this patient. I reviewed the vital signs, available nursing notes, past medical history, past surgical history, family history and social history. Vital Signs-Reviewed the patient's vital signs. Records Reviewed: Nursing Notes (Time of Review: 11:55 AM)    ED Course: Progress Notes, Reevaluation, and Consults:      Provider Notes (Medical Decision Making): Patient presenting for evaluation of right lower extremity pain at site of recent amputation. Patient is from a nursing home. He is a poor historian. COSEM anderson was used as a . There is mild erythema at the incision with no purulence, no fluctuance. There is bloody drainage on the dressing. Patient is afebrile with reassuring vitals.   He is often resting comfortably in the ED. his labs are reviewed. Discussed with vascular surgery. Plan for oral antibiotics and close follow-up with his vascular surgeon. No osteomyelitis or cutaneous air seen on x-ray. Do not think the patient clinically has osteomyelitis or an abscess or needs further imaging. There was a brief mention that patient stated that he wanted to kill himself to nursing staff. However on upon asking the patient multiple times with a  he continued to deny that he was suicidal or had any thoughts of harming himself. Do not think the patient requires inpatient psychiatric admission. Consult:  Discussed care with Dr. Yadira Babcock (Vascular Surgeon). Standard discussion; including history of patients chief complaint, available diagnostic results, and treatment course. Aware pt is afebrile and WBC 11.9. Discussed appearance of stump. Agrees with plan for oral antibiotics and close follow-up with the patient's vascular surgeon. 2:28 PM, 12/30/2019       Diagnosis     Clinical Impression:   1. Infection of right below knee amputation ([address]    2. ESRD (end stage renal disease) on dialysis [facility])        Disposition: DC    Follow-up Information     Follow up With Specialties Details Why Contact Info    Rebecca Bennett MD Internal Medicine Schedule an appointment as soon as possible for a visit in 1 day  [facility] [address] . [address] EMERGENCY DEPT Emergency Medicine  As needed, If symptoms worsen [address]  [phone]    [facility], Tonya Leslie MD Vascular Surgery Schedule an appointment as soon as possible for a visit in 1 day  [address] [address]  [phone]             Patient's Medications   Start Taking    DOXYCYCLINE (MONODOX) 100 MG CAPSULE    Take 1 Cap by mouth two (2) times a day for 10 days. Continue Taking    AMIODARONE (CORDARONE) 200 MG TABLET    Take 200 mg by mouth daily. AMLODIPINE-BENAZEPRIL (LOTREL) 5-20 MG PER CAPSULE    Take 1 Cap by mouth daily. ASPIRIN DELAYED-RELEASE 81 MG TABLET    Take 81 mg by mouth daily. CHLORPROMAZINE (THORAZINE) 10 MG TABLET    Take 10 mg by mouth three (3) times daily. CHOLECALCIFEROL, VITAMIN D3, (VITAMIN D3) 2,000 UNIT TAB    Take  by mouth daily. FENOFIBRATE NANOCRYSTALLIZED (TRICOR) 145 MG TABLET    Take  by mouth daily. GLIMEPIRIDE (AMARYL) 1 MG TABLET    Take 1 mg by mouth Daily (before breakfast). ISOSORBIDE DINITRATE (ISORDIL) 30 MG TABLET    Take 30 mg by mouth daily. LINAGLIPTIN (TRADJENTA) 5 MG TABLET    Take 5 mg by mouth daily. Indications: type 2 diabetes mellitus    METFORMIN (GLUCOPHAGE) 1,000 MG TABLET    Take 1,000 mg by mouth two (2) times daily (with meals). METOPROLOL-XL (TOPROL XL) 100 MG XL TABLET    Take  by mouth daily. NITROGLYCERIN (NITROSTAT) 0.4 MG SL TABLET    by SubLINGual route every five (5) minutes as needed for Chest Pain. SENNOSIDES (SENNA LAX PO)    Take  by mouth as needed. SEVELAMER CARBONATE (RENVELA) 800 MG TAB TAB    Take 800 mg by mouth three (3) times daily. SIMVASTATIN (ZOCOR) 40 MG TABLET    Take  by mouth nightly. WARFARIN (COUMADIN) 5 MG TABLET    Take 1 Tab by mouth daily. These Medications have changed    No medications on file   Stop Taking    No medications on file     _______________________________    Please note that this dictation was completed with [employer], the [employer] voice recognition software. Quite often unanticipated grammatical, syntax, homophones, and other interpretive errors are inadvertently transcribed by the computer software. Please disregard these errors. Please excuse any errors that have escaped final proofreading.

## 2019-12-30 NOTE — DISCHARGE INSTRUCTIONS
There is concern that you are developing an infection at your incision site. You need to have an appointment with your vascular surgeon as soon as possible. Please continue with antibiotics as prescribed.

## 2019-12-30 NOTE — ED NOTES
Pt in care of transport service for transport back to [facility]. Pt in no distress at time of discharge.

## 2019-12-30 NOTE — ED NOTES
Telephone report to [address] [place] [address]. Discharge instructions discussed with understanding verbalized. Pt discharged by another RN, peripheral iv left in place. Charge nurse made aware.   [facility] notified and states will remove IV upon pt arrival.

## 2020-01-01 ENCOUNTER — HOSPITAL ENCOUNTER (INPATIENT)
Age: 60
LOS: 7 days | Discharge: NURSING, REHAB OR LONG-TERM CARE | DRG: 280 | End: 2020-01-08
Attending: EMERGENCY MEDICINE | Admitting: HOSPITALIST
Payer: MEDICARE

## 2020-01-01 ENCOUNTER — APPOINTMENT (OUTPATIENT)
Dept: GENERAL RADIOLOGY | Age: 60
DRG: 280 | End: 2020-01-01
Attending: EMERGENCY MEDICINE
Payer: MEDICARE

## 2020-01-01 DIAGNOSIS — R77.8 ELEVATED TROPONIN: ICD-10-CM

## 2020-01-01 DIAGNOSIS — I21.3 STEMI (ST ELEVATION MYOCARDIAL INFARCTION) (HCC): ICD-10-CM

## 2020-01-01 DIAGNOSIS — K92.2 GASTROINTESTINAL HEMORRHAGE, UNSPECIFIED GASTROINTESTINAL HEMORRHAGE TYPE: Primary | ICD-10-CM

## 2020-01-01 DIAGNOSIS — D64.9 ANEMIA, UNSPECIFIED TYPE: ICD-10-CM

## 2020-01-01 PROBLEM — I24.9 ACS (ACUTE CORONARY SYNDROME) (HCC): Status: ACTIVE | Noted: 2020-01-01

## 2020-01-01 PROBLEM — D62 ACUTE BLOOD LOSS ANEMIA: Status: ACTIVE | Noted: 2020-01-01

## 2020-01-01 PROBLEM — E11.9 DM (DIABETES MELLITUS) (HCC): Status: ACTIVE | Noted: 2020-01-01

## 2020-01-01 PROBLEM — I10 HTN (HYPERTENSION): Status: ACTIVE | Noted: 2020-01-01

## 2020-01-01 PROBLEM — N18.6 ESRD (END STAGE RENAL DISEASE) (HCC): Status: ACTIVE | Noted: 2020-01-01

## 2020-01-01 PROBLEM — E78.5 HLD (HYPERLIPIDEMIA): Status: ACTIVE | Noted: 2020-01-01

## 2020-01-01 LAB
ALBUMIN SERPL-MCNC: 2.5 G/DL (ref 3.4–5)
ALBUMIN/GLOB SERPL: 0.5 {RATIO} (ref 0.8–1.7)
ALP SERPL-CCNC: 76 U/L (ref 45–117)
ALT SERPL-CCNC: 20 U/L (ref 16–61)
ANION GAP SERPL CALC-SCNC: 17 MMOL/L (ref 3–18)
APTT PPP: 34.2 SEC (ref 23–36.4)
AST SERPL-CCNC: 18 U/L (ref 10–38)
BASOPHILS # BLD: 0 K/UL (ref 0–0.1)
BASOPHILS NFR BLD: 0 % (ref 0–2)
BILIRUB SERPL-MCNC: 0.3 MG/DL (ref 0.2–1)
BUN SERPL-MCNC: 88 MG/DL (ref 7–18)
BUN/CREAT SERPL: 8 (ref 12–20)
CALCIUM SERPL-MCNC: 8.3 MG/DL (ref 8.5–10.1)
CHLORIDE SERPL-SCNC: 93 MMOL/L (ref 100–111)
CK MB CFR SERPL CALC: 4.9 % (ref 0–4)
CK MB SERPL-MCNC: 8.9 NG/ML (ref 5–25)
CK SERPL-CCNC: 183 U/L (ref 39–308)
CO2 SERPL-SCNC: 25 MMOL/L (ref 21–32)
CREAT SERPL-MCNC: 11.1 MG/DL (ref 0.6–1.3)
DIFFERENTIAL METHOD BLD: ABNORMAL
EOSINOPHIL # BLD: 0.3 K/UL (ref 0–0.4)
EOSINOPHIL NFR BLD: 2 % (ref 0–5)
ERYTHROCYTE [DISTWIDTH] IN BLOOD BY AUTOMATED COUNT: 16.2 % (ref 11.6–14.5)
GLOBULIN SER CALC-MCNC: 4.9 G/DL (ref 2–4)
GLUCOSE SERPL-MCNC: 141 MG/DL (ref 74–99)
HCT VFR BLD AUTO: 17.4 % (ref 36–48)
HEMOCCULT STL QL: POSITIVE
HGB BLD-MCNC: 5.3 G/DL (ref 13–16)
INR PPP: 1 (ref 0.8–1.2)
LYMPHOCYTES # BLD: 0.9 K/UL (ref 0.9–3.6)
LYMPHOCYTES NFR BLD: 6 % (ref 21–52)
MCH RBC QN AUTO: 27.9 PG (ref 24–34)
MCHC RBC AUTO-ENTMCNC: 30.5 G/DL (ref 31–37)
MCV RBC AUTO: 91.6 FL (ref 74–97)
MONOCYTES # BLD: 1.3 K/UL (ref 0.05–1.2)
MONOCYTES NFR BLD: 9 % (ref 3–10)
NEUTS SEG # BLD: 11.2 K/UL (ref 1.8–8)
NEUTS SEG NFR BLD: 83 % (ref 40–73)
PLATELET # BLD AUTO: 419 K/UL (ref 135–420)
PMV BLD AUTO: 8.9 FL (ref 9.2–11.8)
POTASSIUM SERPL-SCNC: 4.4 MMOL/L (ref 3.5–5.5)
PROT SERPL-MCNC: 7.4 G/DL (ref 6.4–8.2)
PROTHROMBIN TIME: 13.1 SEC (ref 11.5–15.2)
RBC # BLD AUTO: 1.9 M/UL (ref 4.7–5.5)
SODIUM SERPL-SCNC: 135 MMOL/L (ref 136–145)
TROPONIN I BLD-MCNC: 0.34 NG/ML (ref 0–0.08)
TROPONIN I SERPL-MCNC: 0.35 NG/ML (ref 0–0.04)
WBC # BLD AUTO: 13.7 K/UL (ref 4.6–13.2)

## 2020-01-01 PROCEDURE — 96374 THER/PROPH/DIAG INJ IV PUSH: CPT

## 2020-01-01 PROCEDURE — 82272 OCCULT BLD FECES 1-3 TESTS: CPT

## 2020-01-01 PROCEDURE — 74011250636 HC RX REV CODE- 250/636: Performed by: EMERGENCY MEDICINE

## 2020-01-01 PROCEDURE — 84484 ASSAY OF TROPONIN QUANT: CPT

## 2020-01-01 PROCEDURE — 30233N1 TRANSFUSION OF NONAUTOLOGOUS RED BLOOD CELLS INTO PERIPHERAL VEIN, PERCUTANEOUS APPROACH: ICD-10-PCS | Performed by: HOSPITALIST

## 2020-01-01 PROCEDURE — 85730 THROMBOPLASTIN TIME PARTIAL: CPT

## 2020-01-01 PROCEDURE — 82550 ASSAY OF CK (CPK): CPT

## 2020-01-01 PROCEDURE — 65660000001 HC RM ICU INTERMED STEPDOWN

## 2020-01-01 PROCEDURE — 65270000029 HC RM PRIVATE

## 2020-01-01 PROCEDURE — 74011250637 HC RX REV CODE- 250/637: Performed by: EMERGENCY MEDICINE

## 2020-01-01 PROCEDURE — 99285 EMERGENCY DEPT VISIT HI MDM: CPT

## 2020-01-01 PROCEDURE — 85025 COMPLETE CBC W/AUTO DIFF WBC: CPT

## 2020-01-01 PROCEDURE — P9016 RBC LEUKOCYTES REDUCED: HCPCS

## 2020-01-01 PROCEDURE — 80053 COMPREHEN METABOLIC PANEL: CPT

## 2020-01-01 PROCEDURE — 73562 X-RAY EXAM OF KNEE 3: CPT

## 2020-01-01 PROCEDURE — 85610 PROTHROMBIN TIME: CPT

## 2020-01-01 PROCEDURE — 36430 TRANSFUSION BLD/BLD COMPNT: CPT

## 2020-01-01 PROCEDURE — 93005 ELECTROCARDIOGRAM TRACING: CPT

## 2020-01-01 PROCEDURE — C9113 INJ PANTOPRAZOLE SODIUM, VIA: HCPCS | Performed by: EMERGENCY MEDICINE

## 2020-01-01 PROCEDURE — 86923 COMPATIBILITY TEST ELECTRIC: CPT

## 2020-01-01 PROCEDURE — 86920 COMPATIBILITY TEST SPIN: CPT

## 2020-01-01 PROCEDURE — 86900 BLOOD TYPING SEROLOGIC ABO: CPT

## 2020-01-01 RX ORDER — SODIUM CHLORIDE 9 MG/ML
250 INJECTION, SOLUTION INTRAVENOUS AS NEEDED
Status: DISCONTINUED | OUTPATIENT
Start: 2020-01-01 | End: 2020-01-02

## 2020-01-01 RX ORDER — ONDANSETRON 2 MG/ML
4 INJECTION INTRAMUSCULAR; INTRAVENOUS
Status: CANCELLED | OUTPATIENT
Start: 2020-01-01

## 2020-01-01 RX ORDER — MAGNESIUM SULFATE 100 %
4 CRYSTALS MISCELLANEOUS AS NEEDED
Status: CANCELLED | OUTPATIENT
Start: 2020-01-01

## 2020-01-01 RX ORDER — HEPARIN SODIUM 1000 [USP'U]/ML
INJECTION, SOLUTION INTRAVENOUS; SUBCUTANEOUS
Status: DISCONTINUED
Start: 2020-01-01 | End: 2020-01-01

## 2020-01-01 RX ORDER — PANTOPRAZOLE SODIUM 40 MG/10ML
80 INJECTION, POWDER, LYOPHILIZED, FOR SOLUTION INTRAVENOUS
Status: COMPLETED | OUTPATIENT
Start: 2020-01-01 | End: 2020-01-01

## 2020-01-01 RX ORDER — HEPARIN SODIUM 10000 [USP'U]/100ML
12-25 INJECTION, SOLUTION INTRAVENOUS
Status: DISCONTINUED | OUTPATIENT
Start: 2020-01-01 | End: 2020-01-02

## 2020-01-01 RX ORDER — LORAZEPAM 2 MG/ML
1 INJECTION INTRAMUSCULAR
Status: COMPLETED | OUTPATIENT
Start: 2020-01-01 | End: 2020-01-01

## 2020-01-01 RX ORDER — ASPIRIN 300 MG/1
300 SUPPOSITORY RECTAL
Status: COMPLETED | OUTPATIENT
Start: 2020-01-01 | End: 2020-01-01

## 2020-01-01 RX ORDER — INSULIN LISPRO 100 [IU]/ML
INJECTION, SOLUTION INTRAVENOUS; SUBCUTANEOUS EVERY 6 HOURS
Status: CANCELLED | OUTPATIENT
Start: 2020-01-02

## 2020-01-01 RX ORDER — HEPARIN SODIUM 1000 [USP'U]/ML
60 INJECTION, SOLUTION INTRAVENOUS; SUBCUTANEOUS ONCE
Status: DISCONTINUED | OUTPATIENT
Start: 2020-01-01 | End: 2020-01-02

## 2020-01-01 RX ORDER — DEXTROSE MONOHYDRATE AND SODIUM CHLORIDE 5; .45 G/100ML; G/100ML
100 INJECTION, SOLUTION INTRAVENOUS CONTINUOUS
Status: CANCELLED | OUTPATIENT
Start: 2020-01-01

## 2020-01-01 RX ORDER — HEPARIN SODIUM 5000 [USP'U]/ML
INJECTION, SOLUTION INTRAVENOUS; SUBCUTANEOUS
Status: DISCONTINUED
Start: 2020-01-01 | End: 2020-01-01

## 2020-01-01 RX ORDER — HEPARIN SODIUM 10000 [USP'U]/100ML
INJECTION, SOLUTION INTRAVENOUS
Status: DISCONTINUED
Start: 2020-01-01 | End: 2020-01-02

## 2020-01-01 RX ADMIN — PANTOPRAZOLE SODIUM 80 MG: 40 INJECTION, POWDER, FOR SOLUTION INTRAVENOUS at 23:45

## 2020-01-01 RX ADMIN — ASPIRIN 300 MG: 300 SUPPOSITORY RECTAL at 19:48

## 2020-01-01 RX ADMIN — LORAZEPAM 1 MG: 2 INJECTION, SOLUTION INTRAMUSCULAR; INTRAVENOUS at 19:47

## 2020-01-01 RX ADMIN — SODIUM CHLORIDE, SODIUM LACTATE, POTASSIUM CHLORIDE, AND CALCIUM CHLORIDE 1000 ML: 600; 310; 30; 20 INJECTION, SOLUTION INTRAVENOUS at 20:37
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
## 2020-01-02 ENCOUNTER — HOME HEALTH ADMISSION (OUTPATIENT)
Dept: HOME HEALTH SERVICES | Facility: HOME HEALTH | Age: 60
End: 2020-01-02

## 2020-01-02 ENCOUNTER — HOSPICE ADMISSION (OUTPATIENT)
Dept: HOSPICE | Facility: HOSPICE | Age: 60
End: 2020-01-02

## 2020-01-02 ENCOUNTER — APPOINTMENT (OUTPATIENT)
Dept: NON INVASIVE DIAGNOSTICS | Age: 60
DRG: 280 | End: 2020-01-02
Attending: INTERNAL MEDICINE
Payer: MEDICARE

## 2020-01-02 LAB
ATRIAL RATE: 115 BPM
BASOPHILS # BLD: 0 K/UL (ref 0–0.1)
BASOPHILS NFR BLD: 0 % (ref 0–2)
CALCULATED P AXIS, ECG09: 73 DEGREES
CALCULATED R AXIS, ECG10: 46 DEGREES
CALCULATED T AXIS, ECG11: 150 DEGREES
CK MB CFR SERPL CALC: 8 % (ref 0–4)
CK MB SERPL-MCNC: 14.1 NG/ML (ref 5–25)
CK SERPL-CCNC: 176 U/L (ref 39–308)
DIAGNOSIS, 93000: NORMAL
DIFFERENTIAL METHOD BLD: ABNORMAL
ECHO PULMONARY ARTERY SYSTOLIC PRESSURE (PASP): 38.8 MMHG
ECHO TV REGURGITANT MAX VELOCITY: 299 CM/S
ECHO TV REGURGITANT PEAK GRADIENT: 38.8 MMHG
EOSINOPHIL # BLD: 0.3 K/UL (ref 0–0.4)
EOSINOPHIL NFR BLD: 2 % (ref 0–5)
ERYTHROCYTE [DISTWIDTH] IN BLOOD BY AUTOMATED COUNT: 15.4 % (ref 11.6–14.5)
ERYTHROCYTE [DISTWIDTH] IN BLOOD BY AUTOMATED COUNT: 15.5 % (ref 11.6–14.5)
HCT VFR BLD AUTO: 15.3 % (ref 36–48)
HCT VFR BLD AUTO: 17.7 % (ref 36–48)
HGB BLD-MCNC: 5 G/DL (ref 13–16)
HGB BLD-MCNC: 5.7 G/DL (ref 13–16)
LYMPHOCYTES # BLD: 0.9 K/UL (ref 0.9–3.6)
LYMPHOCYTES NFR BLD: 6 % (ref 21–52)
MCH RBC QN AUTO: 29.2 PG (ref 24–34)
MCH RBC QN AUTO: 29.4 PG (ref 24–34)
MCHC RBC AUTO-ENTMCNC: 32.2 G/DL (ref 31–37)
MCHC RBC AUTO-ENTMCNC: 32.7 G/DL (ref 31–37)
MCV RBC AUTO: 90 FL (ref 74–97)
MCV RBC AUTO: 90.8 FL (ref 74–97)
MONOCYTES # BLD: 0.5 K/UL (ref 0.05–1.2)
MONOCYTES NFR BLD: 3 % (ref 3–10)
NEUTS SEG # BLD: 14.9 K/UL (ref 1.8–8)
NEUTS SEG NFR BLD: 89 % (ref 40–73)
P-R INTERVAL, ECG05: 174 MS
PLATELET # BLD AUTO: 262 K/UL (ref 135–420)
PLATELET # BLD AUTO: 323 K/UL (ref 135–420)
PMV BLD AUTO: 8.8 FL (ref 9.2–11.8)
PMV BLD AUTO: 9 FL (ref 9.2–11.8)
Q-T INTERVAL, ECG07: 366 MS
QRS DURATION, ECG06: 142 MS
QTC CALCULATION (BEZET), ECG08: 506 MS
RBC # BLD AUTO: 1.7 M/UL (ref 4.7–5.5)
RBC # BLD AUTO: 1.95 M/UL (ref 4.7–5.5)
TROPONIN I SERPL-MCNC: 2.88 NG/ML (ref 0–0.04)
VENTRICULAR RATE, ECG03: 115 BPM
WBC # BLD AUTO: 10.7 K/UL (ref 4.6–13.2)
WBC # BLD AUTO: 16.6 K/UL (ref 4.6–13.2)

## 2020-01-02 PROCEDURE — 82550 ASSAY OF CK (CPK): CPT

## 2020-01-02 PROCEDURE — 65270000029 HC RM PRIVATE

## 2020-01-02 PROCEDURE — 85025 COMPLETE CBC W/AUTO DIFF WBC: CPT

## 2020-01-02 PROCEDURE — 85027 COMPLETE CBC AUTOMATED: CPT

## 2020-01-02 PROCEDURE — 36415 COLL VENOUS BLD VENIPUNCTURE: CPT

## 2020-01-02 PROCEDURE — 93308 TTE F-UP OR LMTD: CPT

## 2020-01-02 PROCEDURE — 86901 BLOOD TYPING SEROLOGIC RH(D): CPT

## 2020-01-02 PROCEDURE — 74011250637 HC RX REV CODE- 250/637: Performed by: HOSPITALIST

## 2020-01-02 RX ORDER — NALOXONE HYDROCHLORIDE 0.4 MG/ML
0.4 INJECTION, SOLUTION INTRAMUSCULAR; INTRAVENOUS; SUBCUTANEOUS AS NEEDED
Status: DISCONTINUED | OUTPATIENT
Start: 2020-01-02 | End: 2020-01-08 | Stop reason: HOSPADM

## 2020-01-02 RX ORDER — SCOLOPAMINE TRANSDERMAL SYSTEM 1 MG/1
1 PATCH, EXTENDED RELEASE TRANSDERMAL
Status: DISCONTINUED | OUTPATIENT
Start: 2020-01-02 | End: 2020-01-02

## 2020-01-02 RX ORDER — MORPHINE SULFATE 20 MG/ML
10 SOLUTION ORAL
Status: DISCONTINUED | OUTPATIENT
Start: 2020-01-02 | End: 2020-01-02

## 2020-01-02 RX ORDER — SEVELAMER CARBONATE 800 MG/1
800 TABLET, FILM COATED ORAL 3 TIMES DAILY
Status: DISCONTINUED | OUTPATIENT
Start: 2020-01-02 | End: 2020-01-02

## 2020-01-02 RX ORDER — AMOXICILLIN 250 MG
2 CAPSULE ORAL
Status: DISCONTINUED | OUTPATIENT
Start: 2020-01-02 | End: 2020-01-08 | Stop reason: HOSPADM

## 2020-01-02 RX ORDER — ASPIRIN 81 MG/1
81 TABLET ORAL DAILY
Status: DISCONTINUED | OUTPATIENT
Start: 2020-01-02 | End: 2020-01-02

## 2020-01-02 RX ORDER — SIMVASTATIN 40 MG/1
40 TABLET, FILM COATED ORAL
Status: DISCONTINUED | OUTPATIENT
Start: 2020-01-02 | End: 2020-01-02

## 2020-01-02 RX ORDER — FENOFIBRATE 145 MG/1
145 TABLET, COATED ORAL DAILY
Status: DISCONTINUED | OUTPATIENT
Start: 2020-01-02 | End: 2020-01-02

## 2020-01-02 RX ORDER — SODIUM CHLORIDE 9 MG/ML
250 INJECTION, SOLUTION INTRAVENOUS AS NEEDED
Status: DISCONTINUED | OUTPATIENT
Start: 2020-01-02 | End: 2020-01-08 | Stop reason: HOSPADM

## 2020-01-02 RX ORDER — FACIAL-BODY WIPES
10 EACH TOPICAL DAILY PRN
Status: DISCONTINUED | OUTPATIENT
Start: 2020-01-02 | End: 2020-01-08 | Stop reason: HOSPADM

## 2020-01-02 RX ORDER — LORAZEPAM 2 MG/ML
1 CONCENTRATE ORAL
Status: DISCONTINUED | OUTPATIENT
Start: 2020-01-02 | End: 2020-01-02

## 2020-01-02 RX ORDER — ACETAMINOPHEN 325 MG/1
650 TABLET ORAL
Status: DISCONTINUED | OUTPATIENT
Start: 2020-01-02 | End: 2020-01-08 | Stop reason: HOSPADM

## 2020-01-02 RX ORDER — ONDANSETRON 2 MG/ML
4 INJECTION INTRAMUSCULAR; INTRAVENOUS
Status: DISCONTINUED | OUTPATIENT
Start: 2020-01-02 | End: 2020-01-08 | Stop reason: HOSPADM

## 2020-01-02 RX ORDER — NITROGLYCERIN 0.4 MG/1
0.4 TABLET SUBLINGUAL
Status: DISCONTINUED | OUTPATIENT
Start: 2020-01-02 | End: 2020-01-08 | Stop reason: HOSPADM

## 2020-01-02 RX ORDER — AMIODARONE HYDROCHLORIDE 200 MG/1
200 TABLET ORAL DAILY
Status: DISCONTINUED | OUTPATIENT
Start: 2020-01-02 | End: 2020-01-08 | Stop reason: HOSPADM

## 2020-01-02 RX ADMIN — MORPHINE SULFATE 10 MG: 20 SOLUTION ORAL at 08:06

## 2020-01-02 RX ADMIN — AMIODARONE HYDROCHLORIDE 200 MG: 200 TABLET ORAL at 08:42

## 2020-01-02 RX ADMIN — MORPHINE SULFATE 10 MG: 20 SOLUTION ORAL at 04:48

## 2020-01-02 RX ADMIN — LORAZEPAM 1 MG: 2 SOLUTION, CONCENTRATE ORAL at 04:48

## 2020-01-02 RX ADMIN — LORAZEPAM 1 MG: 2 SOLUTION, CONCENTRATE ORAL at 08:05

## 2020-01-02 RX ADMIN — MORPHINE SULFATE 10 MG: 20 SOLUTION ORAL at 11:01

## 2020-01-02 RX ADMIN — MORPHINE SULFATE 10 MG: 20 SOLUTION ORAL at 19:44

## 2020-01-02 NOTE — HOSPICE
Spoke with CHRIS Pack. Tory Pack to speak with family in regards to who can legally make decisions for the patient as the [place] and then notify this nurse. Hospice will continue to follow. Thank you for the referral to [facility]. Please contact [phone] with any questions or concerns.             Love Silva, ALIZAN, RN  Clinical Coordinator  [place] [id number] [facility], [address] [place] [address] Str.  [phone]

## 2020-01-02 NOTE — ED NOTES
Report received from [facility], RN. Patient has an assigned bed. Will call report and transport patient. Patient resting in stretcher with eyes closed. Eyes open to voice. Patient following commands. Respirations even and unlabored. Currently on 2L nasal cannula.

## 2020-01-02 NOTE — PROGRESS NOTES
Problem: Non-Violent Restraints  Goal: *Removal from restraints as soon as assessed to be safe  Outcome: Progressing Towards Goal  Goal: *No harm/injury to patient while restraints in use  Outcome: Progressing Towards Goal  Goal: *Patient's dignity will be maintained  Outcome: Progressing Towards Goal  Goal: Non-violent Restaints:Standard Interventions  Outcome: Progressing Towards Goal     Problem: Discharge Planning  Goal: *Discharge to safe environment  Outcome: Progressing Towards Goal     Problem: Falls - Risk of  Goal: *Absence of Falls  Description  Document Romulo Preciado Fall Risk and appropriate interventions in the flowsheet. Outcome: Progressing Towards Goal  Note: Fall Risk Interventions:       Mentation Interventions: Adequate sleep, hydration, pain control, Bed/chair exit alarm, Door open when patient unattended, More frequent rounding, Reorient patient, Room close to nurse's station    Medication Interventions: Bed/chair exit alarm    Elimination Interventions: Bed/chair exit alarm, Call light in reach    History of Falls Interventions: Bed/chair exit alarm, Door open when patient unattended, Room close to nurse's station         Problem: Patient Education: Go to Patient Education Activity  Goal: Patient/Family Education  Outcome: Progressing Towards Goal     Problem: Pressure Injury - Risk of  Goal: *Prevention of pressure injury  Description  Document Enrique Scale and appropriate interventions in the flowsheet.   Outcome: Progressing Towards Goal  Note: Pressure Injury Interventions:       Moisture Interventions: Absorbent underpads, Apply protective barrier, creams and emollients    Activity Interventions: Pressure redistribution bed/mattress(bed type)    Mobility Interventions: HOB 30 degrees or less, Pressure redistribution bed/mattress (bed type)    Nutrition Interventions: Document food/fluid/supplement intake, Offer support with meals,snacks and hydration    Friction and Shear Interventions: Apply protective barrier, creams and emollients, Foam dressings/transparent film/skin sealants, HOB 30 degrees or less, Minimize layers                Problem: Patient Education: Go to Patient Education Activity  Goal: Patient/Family Education  Outcome: Progressing Towards Goal     Problem: Pain  Goal: *Control of Pain  Outcome: Progressing Towards Goal     Problem: Patient Education: Go to Patient Education Activity  Goal: Patient/Family Education  Outcome: Progressing Towards Goal

## 2020-01-02 NOTE — PROGRESS NOTES
conducted a Follow up consultation and Spiritual Assessment for Jyoti Mora, who is a 61 y.o.,male. The  provided the following Interventions:  Patient was not in conscious. He was at hospice and DNR. His son wants to do all the treatments and wants his father alive. Since he is DNR and I spoke with his cousin. I tried to brief son about the situation and prayed for him   Plan:  Chaplains will continue to follow and will provide pastoral care on an as needed/requested basis.  recommends bedside caregivers page  on duty if patient shows signs of acute spiritual or emotional distress.        [email or web address]   [phone]

## 2020-01-02 NOTE — ED NOTES
Pt placed on inpatient stretcher for comfort. Pt resting comfortably. Warm blankets and pillow provided. Will continue to monitor.

## 2020-01-02 NOTE — H&P
Medicine History and Physical    Patient: Sim Wakefield   Age:  61 y.o. Assessment   Principal Problem:    ACS (acute coronary syndrome) ([facility] (1/1/2020)    Active Problems:    Acute blood loss anemia (1/1/2020)      ESRD (end stage renal disease) ([facility] (1/1/2020)      HTN (hypertension) (1/1/2020)      HLD (hyperlipidemia) (1/1/2020)      DM (diabetes mellitus) ([facility] (1/1/2020)      GI bleed (1/1/2020)          Plan     1)  ACS, possible STEMI   - cards consulted, awaiting official note but recommendations include holding off on invasive testing but to start on heparin drip. - tele monitor, follow trops, asa and statin    2)  ESRD   - nephro consult tomorrow    3)  HTN   - borderline low, hold meds, slow fluids    4)  DM   - SSI    5)  GI bleed   - have asked ED to consult GI   - 2 U blood now   - q 8 H&H   - hemoccult pending    DISPO  -Pt to be admitted  at this time for reasons addressed above, continued hospitalization for ongoing assessment and treatment indicated     Anticipated Date of Discharge: >2 days  Anticipated Disposition (home, SNF) : SNF    Chief Complaint:   Chief Complaint   Patient presents with    Other         HPI:   Sim Wakefield is a 61y.o. year old male who presents from [facility] with what appears to be a very poor report. Apparently he was \"altered\" and family requested he sent to ed. In ED he complains of being cold and complains of heart pain. He speaks minimal english. He is found to have a hb of 5.3, an ekg is done and stemi code called-  Appears III has st elevation and anterior lateral leads have some ST depression and t wave inversions. STEMI canceled. Cards consulted and recommended anticoagulation but stabilization before further treatment (cath)      Review of Systems - positive responses in bold type   Constitutional: Negative for fever, chills, diaphoresis and unexpected weight change.    HENT: Negative for ear pain, congestion, sore throat, rhinorrhea, drooling, trouble swallowing, neck pain and tinnitus. Eyes: Negative for photophobia, pain, redness and visual disturbance. Respiratory: negative for shortness of breath, cough, choking, chest tightness, wheezing or stridor. Cardiovascular: Negative for chest pain, palpitations and leg swelling. Gastrointestinal: Negative for nausea, vomiting, abdominal pain, diarrhea, constipation, blood in stool, abdominal distention and anal bleeding. Genitourinary: Negative for dysuria, urgency, frequency, hematuria, flank pain and difficulty urinating. Musculoskeletal: Negative for back pain and arthralgias. Skin: Negative for color change, rash and wound. Neurological: Negative for dizziness, seizures, syncope, speech difficulty, light-headedness or headaches. Hematological: Does not bruise/bleed easily.    Psychiatric/Behavioral: Negative for suicidal ideas, hallucinations, behavioral problems, self-injury or agitation       Past Medical History:  Past Medical History:   Diagnosis Date    CAD (coronary artery disease)     Chronic kidney disease     Chronic pain     back    Diabetes ([facility]     Hypercholesterolemia     Hypertension     Stroke [facility])        Past Surgical History:  Past Surgical History:   Procedure Laterality Date    CARDIAC SURG PROCEDURE UNLIST      angioplasty    HX GI  3-2008    EGD Normal    HX VASCULAR ACCESS      AV fistula       Family History:  Family History   Problem Relation Age of Onset    Heart Disease Mother     Hypertension Sister     Hypertension Brother        Social History:  Social History     Socioeconomic History    Marital status: SINGLE     Spouse name: Not on file    Number of children: Not on file    Years of education: Not on file    Highest education level: Not on file   Tobacco Use    Smoking status: Current Every Day Smoker     Packs/day: 1.00     Years: 39.00     Pack years: 39.00    Smokeless tobacco: Never Used   Substance and Sexual Activity    Alcohol use: No    Drug use: No       Home Medications:  Prior to Admission medications    Medication Sig Start Date End Date Taking? Authorizing Provider   doxycycline (MONODOX) 100 mg capsule Take 1 Cap by mouth two (2) times a day for 10 days. 12/30/19 1/9/20  Rhea Kim MD   chlorproMAZINE (THORAZINE) 10 mg tablet Take 10 mg by mouth three (3) times daily. Provider, Historical   glimepiride (AMARYL) 1 mg tablet Take 1 mg by mouth Daily (before breakfast). Provider, Historical   isosorbide dinitrate (ISORDIL) 30 mg tablet Take 30 mg by mouth daily. Provider, Historical   nitroglycerin (NITROSTAT) 0.4 mg SL tablet by SubLINGual route every five (5) minutes as needed for Chest Pain. Provider, Historical   sevelamer carbonate (RENVELA) 800 mg tab tab Take 800 mg by mouth three (3) times daily. Provider, Historical   SENNOSIDES (SENNA LAX PO) Take  by mouth as needed. Provider, Historical   linagliptin (TRADJENTA) 5 mg tablet Take 5 mg by mouth daily. Indications: type 2 diabetes mellitus    Provider, Historical   cholecalciferol, vitamin D3, (VITAMIN D3) 2,000 unit tab Take  by mouth daily. Provider, Historical   amiodarone (CORDARONE) 200 mg tablet Take 200 mg by mouth daily. Provider, Historical   warfarin (COUMADIN) 5 mg tablet Take 1 Tab by mouth daily. 4/15/10   Roland Hussein MD   metformin (GLUCOPHAGE) 1,000 mg tablet Take 1,000 mg by mouth two (2) times daily (with meals). Provider, Historical   amlodipine-benazepril (LOTREL) 5-20 mg per capsule Take 1 Cap by mouth daily. Provider, Historical   simvastatin (ZOCOR) 40 mg tablet Take  by mouth nightly. Provider, Historical   metoprolol-XL (TOPROL XL) 100 mg XL tablet Take  by mouth daily. Provider, Historical   aspirin delayed-release 81 mg tablet Take 81 mg by mouth daily. Provider, Historical   fenofibrate nanocrystallized (TRICOR) 145 mg tablet Take  by mouth daily.     Provider, Historical       Allergies:  No Known Allergies        Physical Exam:     Visit Vitals  /45   Pulse (!) 113   Temp 98.7 °F (37.1 °C)   Resp 29   Wt 65.8 kg (145 lb)   SpO2 100%   BMI 25.69 kg/m²       Physical Exam:  General appearance: alert, cooperative, no distress, appears stated age Language barrier  Head: Normocephalic, without obvious abnormality, atraumatic  Neck: supple, trachea midline  Lungs: clear to auscultation bilaterally  Heart: regular rate and rhythm, S1, S2 normal, no murmur, click, rub or gallop  Abdomen: soft, non-tender. Bowel sounds normal. No masses,  no organomegaly  Extremities: R bka well healed but with staples still present  Skin: Skin color, texture, turgor normal. No rashes or lesions  Neurologic: Grossly normal    Intake and Output:  Current Shift:  No intake/output data recorded. Last three shifts:  No intake/output data recorded.     Lab/Data Reviewed:  CMP:   Lab Results   Component Value Date/Time     (L) 01/01/2020 07:35 PM    K 4.4 01/01/2020 07:35 PM    CL 93 (L) 01/01/2020 07:35 PM    CO2 25 01/01/2020 07:35 PM    AGAP 17 01/01/2020 07:35 PM     (H) 01/01/2020 07:35 PM    BUN 88 (H) 01/01/2020 07:35 PM    CREA 11.10 (H) 01/01/2020 07:35 PM    GFRAA 6 (L) 01/01/2020 07:35 PM    GFRNA 5 (L) 01/01/2020 07:35 PM    CA 8.3 (L) 01/01/2020 07:35 PM    ALB 2.5 (L) 01/01/2020 07:35 PM    TP 7.4 01/01/2020 07:35 PM    GLOB 4.9 (H) 01/01/2020 07:35 PM    AGRAT 0.5 (L) 01/01/2020 07:35 PM    SGOT 18 01/01/2020 07:35 PM    ALT 20 01/01/2020 07:35 PM     CBC:   Lab Results   Component Value Date/Time    WBC 13.7 (H) 01/01/2020 07:35 PM    HGB 5.3 (LL) 01/01/2020 07:35 PM    HCT 17.4 (LL) 01/01/2020 07:35 PM     01/01/2020 07:35 PM     All Cardiac Markers in the last 24 hours:   Lab Results   Component Value Date/Time     01/01/2020 07:35 PM    CKMB 8.9 (H) 01/01/2020 07:35 PM    CKND1 4.9 (H) 01/01/2020 07:35 PM    TROIQ 0.35 (H) 01/01/2020 07:35 PM    TNIPOC 0.34 ([facility]) 01/01/2020 07:33 PM ADDENDUM:  Spoke with Dr. Rosa Brewster whom has had lengthy discussion with family. Wife is in [facility] and not easy to reach so son is next of kin. He has poor english and he defered to cousin in helping with decision making and with translating as he speaks [address] well. They have confirmed DNR and are interested in hospice and comfort care. I have made the changes in the computer. [facility] has been called from ED.         Alexandru Ruelas MD    January 1, 2020

## 2020-01-02 NOTE — ED NOTES
TRANSFER - ED to INPATIENT REPORT:    SBAR report made available to receiving floor on this patient being transferred to [address] [place] [address] ([facility])  for routine progression of care       Admitting diagnosis ACS (acute coronary syndrome) ([facility] [I24.9]  ACS (acute coronary syndrome) ([facility] [I24.9]    Information from the following report(s) ED Summary, MAR and Recent Results was made available to receiving floor. Lines:   Peripheral IV 01/01/20 Right Forearm (Active)   Site Assessment Clean, dry, & intact 1/1/2020  7:56 PM   Phlebitis Assessment 0 1/1/2020  7:56 PM   Infiltration Assessment 0 1/1/2020  7:56 PM   Dressing Status Clean, dry, & intact 1/1/2020  7:56 PM   Hub Color/Line Status Green 1/1/2020  7:56 PM       Peripheral IV 01/01/20 Right;Upper Arm (Active)   Site Assessment Clean, dry, & intact 1/1/2020  7:57 PM   Phlebitis Assessment 0 1/1/2020  7:57 PM   Infiltration Assessment 0 1/1/2020  7:57 PM   Dressing Status Clean, dry, & intact 1/1/2020  7:57 PM   Hub Color/Line Status Green 1/1/2020  7:57 PM        Medication list updated today    Opportunity for questions and clarification was provided.       Patient is   Patient is  incontinent and non-ambulatory     Valuables transported with patient     Patient transported with:4   O2 @ 2 liters  Registered Nurse    MAP (Monitor): (!) 62 =Monitored (most recent)  Vitals w/ MEWS Score (last day)     Date/Time MEWS Score Pulse Resp Temp BP Level of Consciousness SpO2    01/02/20 0313  3  (!) 101  19  97.9 °F (36.6 °C)  98/49  Alert  100 %    01/02/20 0210          104/51        01/01/20 2329    (!) 122  24        100 %    01/01/20 2320    (!) 111  20    111/59    92 %    01/01/20 2315    (!) 106  28    105/53    100 %    01/01/20 2310    (!) 107  19    98/57    100 %    01/01/20 2305    (!) 108  11    110/55    99 %    01/01/20 2300    (!) 110  20    106/54    100 %    01/01/20 2245    (!) 103  21    94/56    100 %    01/01/20 7971    (!) 102 22    91/45    100 %    01/01/20 2215    (!) 101  27    91/49    100 %    01/01/20 2200    (!) 109  29    99/55    100 %    01/01/20 2145    (!) 116  22    93/58        01/01/20 2130    (!) 110  23    106/54    100 %    01/01/20 2115    (!) 111  19    108/61    100 %    01/01/20 2100    (!) 114  25    126/66    (!) 83 %    01/01/20 2045    (!) 115  25    117/64        01/01/20 2030    (!) 124  16    105/57    90 %    01/01/20 2015    (!) 113  24    (!) 90/24        01/01/20 2000    (!) 116  23    90/58    (!) 84 %    01/01/20 1945    (!) 125  26    94/52        01/01/20 1902  4  (!) 113  29  98.7 °F (37.1 °C)  111/45  Alert  100 %    01/01/20 1900    (!) 117  15    116/69    

## 2020-01-02 NOTE — PROGRESS NOTES
Medicine Progress Note    Patient: Gerald Calhoun   Age:  61 y.o.  DOA: 1/1/2020   Admit Dx / CC: ACS [facility]acute coronary syndrome) [facility] [I24.9]  ACS (acute coronary syndrome) ([facility] [I24.9]  LOS:  LOS: 1 day     Assessment/Plan   Principal Problem:    ACS (acute coronary syndrome) ([facility] (1/1/2020)    Active Problems:    Acute blood loss anemia (1/1/2020)      ESRD (end stage renal disease) ([facility] (1/1/2020)      HTN (hypertension) (1/1/2020)      HLD (hyperlipidemia) (1/1/2020)      DM (diabetes mellitus) ([facility] (1/1/2020)      GI bleed (1/1/2020)        Additional Plan notes     Patient is now comfort care, now DNR. Discussed with Dr. Rachael Moyer whom has spoke in depth with family. Hospice consulted. DISPO     Anticipated Date of Discharge: 1-2 days  Anticipated Disposition (home, SNF) : SNF    Subjective:   Patient seen and examined. NAD, language barrier. Objective:     Visit Vitals  /59   Pulse (!) 112   Temp 98.5 °F (36.9 °C)   Resp 20   Ht 5' 4\" (1.626 m)   Wt 55 kg (121 lb 4.1 oz)   SpO2 94%   BMI 20.81 kg/m²       Physical Exam:  General appearance: alert, cooperative, no distress, appears stated age  Head: Normocephalic, without obvious abnormality, atraumatic  Neck: supple, trachea midline  Lungs: clear to auscultation bilaterally  Heart: regular rate and rhythm, S1, S2 normal, no murmur, click, rub or gallop  Abdomen: soft, non-tender. Bowel sounds normal. No masses,  no organomegaly  Extremities: R BKA- well healed with staples still present  Skin: R BKA    Intake and Output:  Current Shift:  No intake/output data recorded. Last three shifts:  No intake/output data recorded.     Lab/Data Reviewed:  CMP:   Lab Results   Component Value Date/Time     (L) 01/01/2020 07:35 PM    K 4.4 01/01/2020 07:35 PM    CL 93 (L) 01/01/2020 07:35 PM    CO2 25 01/01/2020 07:35 PM    AGAP 17 01/01/2020 07:35 PM     (H) 01/01/2020 07:35 PM    BUN 88 (H) 01/01/2020 07:35 PM    CREA 11.10 (H) 01/01/2020 07:35 PM    GFRAA 6 (L) 01/01/2020 07:35 PM    GFRNA 5 (L) 01/01/2020 07:35 PM    CA 8.3 (L) 01/01/2020 07:35 PM    ALB 2.5 (L) 01/01/2020 07:35 PM    TP 7.4 01/01/2020 07:35 PM    GLOB 4.9 (H) 01/01/2020 07:35 PM    AGRAT 0.5 (L) 01/01/2020 07:35 PM    SGOT 18 01/01/2020 07:35 PM    ALT 20 01/01/2020 07:35 PM     CBC:   Lab Results   Component Value Date/Time    WBC 13.7 (H) 01/01/2020 07:35 PM    HGB 5.3 (LL) 01/01/2020 07:35 PM    HCT 17.4 (LL) 01/01/2020 07:35 PM     01/01/2020 07:35 PM     All Cardiac Markers in the last 24 hours:   Lab Results   Component Value Date/Time     01/01/2020 07:35 PM    CKMB 8.9 (H) 01/01/2020 07:35 PM    CKND1 4.9 (H) 01/01/2020 07:35 PM    TROIQ 0.35 (H) 01/01/2020 07:35 PM    TNIPOC 0.34 ([facility]) 01/01/2020 07:33 PM       Medications Reviewed:  Current Facility-Administered Medications   Medication Dose Route Frequency    amiodarone (CORDARONE) tablet 200 mg  200 mg Oral DAILY    nitroglycerin (NITROSTAT) tablet 0.4 mg  0.4 mg SubLINGual Q5MIN PRN    acetaminophen (TYLENOL) tablet 650 mg  650 mg Oral Q4H PRN    ondansetron (ZOFRAN) injection 4 mg  4 mg IntraVENous Q4H PRN    scopolamine (TRANSDERM-SCOP) 1 mg over 3 days 1 Patch  1 Patch TransDERmal Q72H PRN    senna-docusate (PERICOLACE) 8.6-50 mg per tablet 2 Tab  2 Tab Oral BID PRN    bisacodyl (DULCOLAX) suppository 10 mg  10 mg Rectal DAILY PRN    morphine (ROXANOL) 100 mg/5 mL (20 mg/mL) concentrated solution 10 mg  10 mg Oral Q1H PRN    LORazepam (INTENSOL) 2 mg/mL oral concentrate 1 mg  1 mg Oral Q1H PRN    naloxone (NARCAN) injection 0.4 mg  0.4 mg IntraVENous PRN       Stella Bennett MD    January 2, 2020

## 2020-01-02 NOTE — ROUTINE PROCESS
TRANSFER - IN REPORT:    80: Verbal report received from Rosa Maria Borja RN(name) on [facility]  being received from ER(unit) for routine progression of care      Report consisted of patients Situation, Background, Assessment and   Recommendations(SBAR). Information from the following report(s) SBAR, Kardex, Intake/Output, MAR, Recent Results and Cardiac Rhythm SR was reviewed with the receiving nurse. Opportunity for questions and clarification was provided. 4519: Received patient from ER via bed. Alert, confused & restless. On oxygen at 2 LPM.  Assessment completed upon patients arrival to unit and care assumed. Oriented patient to room & surroundings. Call light within reach. V/s taken. 0448: Medicated for pain, agitation & restlessness. Reoriented to room & surroundings. 0500: Patient climbing out of bed &  pulling lines. Incontinent of stool. Patient spread the stools everywhere & flicking them up into the air. Obtained order for bilateral wrists restraints. Applied on. Unable to do admission data collection at this time. No significant other/relative at bedside. 0810:Bedside and Verbal shift change report given to Kianna Cardenas (oncoming nurse) by me (offgoing nurse). Report included the following information SBAR, Kardex, Intake/Output, MAR and Recent Results.

## 2020-01-02 NOTE — PROGRESS NOTES
Problem: Discharge Planning  Goal: *Discharge to safe environment  Outcome: Progressing Towards Goal   Back to Nursing Home as of now

## 2020-01-02 NOTE — ED NOTES
Per [facility] Weber nurse, Glenysorrdawit Stover recent amputation, is altered mental status, and is [place], and is taking bandage off. \"\"He's all over the floor, gets out of the bed, and has bowel movements that get on his wound. \"\"Family requested he be sent out. \" \"I'm calling you so that you don't send him right back. \"

## 2020-01-02 NOTE — CDMP QUERY
Pt admitted with STEMI. Pt noted to have confusion/AMS. If possible, please document in the progress notes and d/c summary if you are evaluating and / or treating any of the following:     Metabolic Encephalopathy   Encephalopathy due to medications or drugs (please specify)   Delirium due to (please specify)   Confusion/AMS without encephalopathy   Other, please specify   Clinically unable to determine    The medical record reflects the following:    Risk Factors: anemia, STEMI, ESRD, recent BKA, last visit he was started on doxycycline     Clinical Indicators:   - ED note: Per EMS they were called to [facility], patient was on the ground and had defecated all over himself  - cardiology consult: Patient still appears to be confused  - per nursing: confused    Treatment: receiving nursing care. . pt is now DNR, comfort care      Thank you,  Fidencio Saini [address] [place] [address], [facility]  [phone]

## 2020-01-02 NOTE — PROGRESS NOTES
Discharge/Transition Planning    Problem: Discharge Planning  Goal: *Discharge to safe environment  Outcome: Progressing Towards Goal   Back to Nursing Home as of now            Reviewing chart and notes. Does not appear anyone has called wife about Hospice or decision making and this is pt legal next of kin if pt unable to point anyone. Looks as if Dr Rachael Moyer changed pt to DNR based on a cousin who is unnamed and this is not legal of kin. No documents in chart naming a POA. If pt cannot make decisions and children do not want to make decisions, then it moves down through closest legal next of kin and in collection of majority. Have called and spoken to [facility] with [facility] and notified about referral but physician needs to discuss plan of care with legal next of kin. Son cannot appoint a POA. They will follow to physician talks with appropriate decision makers. Text page Dr Lindsay Jaeger to speak with wife and use , but did not get response. Cousin cannot make pt a DNR and son cannot appoint anyone POA. Decision Making : #1 is patient. #2 Spouse. #3 Adult children collectively. #4. Parents collectively #5 Siblings collectively. # Relatives in descending order. Please discuss DNR and hospice with pt first using blue phone. If pt not alert and oriented, please contact wife. Reason for Admission:   ACS                  RRAT Score:     16             Do you (patient/family) have any concerns for transition/discharge? Legal decision making taking place              Plan for utilizing home health:   Not likely    Current Advanced Directive/Advance Care Plan:  None. DNR order is not valid            Transition of Care Plan:        Went to room and pt appeared sleeping and did not open eyes or answer to name being called. There is no [facility] in room. Pt came from [facility]. Unclear if pt is confused daily or had altered mental. Pt is on dialysis M/W/F [facility] and also had recent BKA.  Have placed referral back to [facility] and further planning on hold till physician speaks with pt, wife or kids using [employer] phone      Patient has designated _____unable__________________ to participate in his/her discharge plan and to receive any needed information. Name:   Address:  Phone number:    Care Management Interventions  PCP Verified by CM:  Yes  Mode of Transport at Discharge: BLS  Transition of Care Consult (CM Consult): Discharge Planning  Current Support Network: [address] [place] [address]  Confirm Follow Up Transport: Other (see comment)  The Plan for Transition of Care is Related to the Following Treatment Goals : nursing home with hospice  The Patient and/or Patient Representative was Provided with a Choice of Provider and Agrees with the Discharge Plan?: Yes  Discharge Location  Discharge Placement: Ga Dunn RN BSN  Outcomes Manager  Pager # [phone]

## 2020-01-02 NOTE — ED TRIAGE NOTES
Pt presents via EMS for RLE pain and altered per BOLD Guidance. Pt poor historian. Keeps stating he's cold. Pt unable to say when he received dialysis last. Responds \"yes\" to everything.  Alert, oriented x 4

## 2020-01-02 NOTE — HOSPICE
Referral received. Chart review in process. Thank you for the referral to [facility]. Please contact [phone] with any questions or concerns.             ALIZA WashingtonN, RN  Clinical Coordinator  [place] [address] [place] [address], [address] [place] [address] Str.  [phone]

## 2020-01-02 NOTE — ED NOTES
Patient on coumadin with hgb 5.3. Discussed with Dr. Maricruz Soliman prior to administering ordered heparin. Verbal order with readback to hold heparin at this time.  Primary nurse Vero Pablo made aware

## 2020-01-02 NOTE — ROUTINE PROCESS
5806 Received report from [facility]. Patient awake and yelling on bilateral arm restraints. Patient wants his restraint to be removed. Ativan and Morphine given. 0900 Patient calmer. Restraints removed, patient not pulling IV lines, not trying to get out ob bed. Oral med given. 1100  Bedside Echo done. Noted Blood work orders. Patient is comfort care, order clarified with Jason ANGELES and Wally ANGELES.      1200 Received a critical call result of Hgb 5 & Hct 15.3. Informed Dr Consuelo Razo, no orders received. Dr Consuelo Razo will look into it. 1314 Received critical result of troponin 2.88 Leeanne ANGELES Cardiology informed. No orders received. 1400 Received a call from [address] asking if the Patient received all the blood transfusion. Informed him to come to the Hospital and bring Patient's son. 0 Patient's son Prasad,Patient's nephew [address], and other family friends in the room and asking about the Patient and what is going on. Asking if the Internal bleeding resolved. Informed son Patient is comfort care and not receiving anymore blood. Informed them also that no procedures to be done to check GI bleed due to Patient's condition. Son said that he wants everything done to his Father to keep him alive. Asked him about comfort care and Hospice Care and he does not know what it meant. Printed some information about [facility] and given to them. Son wants everything done to his father to keep alive. Informed Dr Consuelo Razo that Son at bedside and does not want comfort care for his Father. Dr Consuelo Razo said to discontinue the order. Advised son to stay at bedside and wait for the Doctor to round. He can not stay but left his phone number. Paged [facility] on what time they will come see Patient -They will come tomorrow and relayed information to the Son Aaron Frances. 1930 Bedside and Verbal shift change report given to ADONIS AGUIAR (oncoming nurse) by me (offgoing nurse).  Report included the following information SBAR, Kardex, Intake/Output, MAR and Recent Results. Patient had bloody liquid stool. Incontinent care done.

## 2020-01-02 NOTE — PROGRESS NOTES
NUTRITION INITIAL ASSESSMENT/PLAN OF CARE      RECOMMENDATIONS:   1. Continue current diet  2. Monitor labs, weight and PO intake  3. Feed pt at all meals and document PO intake    GOALS:   1. PO intake meets >75% of protein/calorie needs by 1/5/20  2. Weight Maintenance (+/- 1-2 lb) by 1/9/20       ASSESSMENT:   Wt status is classified as normal per adjusted BMI of 19.5 for amputation. Diagnosis: ACS, anemia, ESRD on HD, HTN, HLD, DM, GI bleed. Nutrition recommendations listed. RD to follow. Nutrition Diagnoses:   Inadequate intake related to AMS as evidenced by 0% intake at lunch per tray visual    SUBJECTIVE/OBJECTIVE:   Pt seen for an initial assessment/ESRD on HD/low BMI. Pt admit with AMS. Pt resting in bed during time of assessment not responding appropriately to questions. Pt is a resident of [facility]. Pt with lunch tray in room untouched. Rec: feed pt at all meals until AMS improves. Adjusted BMI for amputation:19.5. BKA 12/11/19. Hospice consult placed. Feed pt at all meals and document PO intake. Will continue to monitor intake, weight, labs, POC. Information Obtained from:    [x] Chart Review   [x] Patient   [] Family/Caregiver   [x] Nurse/Physician   [] Interdisciplinary Meeting/Rounds    Diet: dental soft solid  Medications: [x] Reviewed  Noted: cordarone, received 100 mL LR, PRN: dulcolax, lorazepam, morphine, zofran, pericolace  Allergies: [x] Reviewed   Encounter Diagnoses     ICD-10-CM ICD-9-CM   1. Gastrointestinal hemorrhage, unspecified gastrointestinal hemorrhage type K92.2 578.9   2. STEMI (ST elevation myocardial infarction) ([facility] I21.3 410.90   3. Anemia, unspecified type D64.9 285.9   4.  Elevated troponin R79.89 790.6     Past Medical History:   Diagnosis Date    CAD (coronary artery disease)     Chronic kidney disease     Chronic pain     back    Diabetes ([facility]     Hypercholesterolemia     Hypertension     Stroke [facility])       Labs:    Lab Results   Component Value Date/Time    Sodium 135 (L) 01/01/2020 07:35 PM    Potassium 4.4 01/01/2020 07:35 PM    Chloride 93 (L) 01/01/2020 07:35 PM    CO2 25 01/01/2020 07:35 PM    Anion gap 17 01/01/2020 07:35 PM    Glucose 141 (H) 01/01/2020 07:35 PM    BUN 88 (H) 01/01/2020 07:35 PM    Creatinine 11.10 (H) 01/01/2020 07:35 PM    Calcium 8.3 (L) 01/01/2020 07:35 PM    Albumin 2.5 (L) 01/01/2020 07:35 PM     Lab Results   Component Value Date/Time     (H) 01/01/2020 07:35 PM     Lab Results   Component Value Date/Time    Cholesterol, total 327 (H) 02/05/2013 02:43 PM    HDL Cholesterol 39 (L) 02/05/2013 02:43 PM    LDL, calculated 231.8 02/05/2013 02:43 PM    VLDL, calculated 56.2 02/05/2013 02:43 PM    Triglyceride 281 (H) 02/05/2013 02:43 PM    CHOL/HDL Ratio 8.4 (H) 02/05/2013 02:43 PM     Anthropometrics: BMI (calculated): 20.8  Last 3 Recorded Weights in this Encounter    01/01/20 1903 01/02/20 0415 01/02/20 1012   Weight: 65.8 kg (145 lb) 55 kg (121 lb 4.1 oz) 54.9 kg (121 lb)      Ht Readings from Last 1 Encounters:   01/02/20 5' 4\" (1.626 m)     Documented Weight History:  Weight Metrics 1/2/2020 12/30/2019 12/24/2019 11/27/2017 8/28/2017 2/18/2010   Weight 121 lb 134 lb 134 lb 167 lb 166 lb 0.1 oz 173 lb   BMI 20.77 kg/m2 23.74 kg/m2 23.74 kg/m2 29.58 kg/m2 29.41 kg/m2 29.68 kg/m2     No data found. IBW: 122lb %IBW: 101% (based on amputation)  [x] Weight Loss [] Weight Gain [] Weight Stable    Estimated Nutrition Needs: [] MSJ  [x] Other:  Calories: 9385-2249 kcal (30-35 kcal/kg) Based on:   [x] Actual BW    Protein:   66-72 g (1.2-1.3 g/kg) Based on:   [x] Actual BW    Fluid:       Urine output + 1000 mL     [x] No Cultural, Mandaeism or ethnic dietary need identified.     [] Cultural, Mandaeism and ethnic food preferences identified and addressed     Wt Status:  [x] Normal (18.6 - 24.9) [] Underweight (<18.5) [] Overweight (25 - 29.9) [] Mild Obesity (30 - 34.9)  [] Moderate Obesity (35 - 39.9) [] Morbid Obesity (40+) Nutrition Problems Identified:   [x] Suboptimal PO intake   [] Food Allergies  [] Difficulty chewing/swallowing/poor dentition  [] Constipation/Diarrhea   [] Nausea/Vomiting   [] None  [] Other:     Plan:   [] Therapeutic Diet  []  Obtained/adjusted food preferences/tolerances and/or snacks options   []  Supplements added   [] Occupational therapy following for feeding techniques  []  HS snack added   [x]  Modify diet texture   []  Modify diet for food allergies   []  Educate patient   []  Assist with menu selection   [x]  Monitor PO intake on meal rounds   [x]  Continue inpatient monitoring and intervention   [x]  Participated in discharge planning/Interdisciplinary rounds/Team meetings   []  Other:     Education Needs:   [x] Not appropriate for teaching at this time due to:  AMS   [] Identified and addressed    Nutrition Monitoring and Evaluation:  [x] Continue ongoing monitoring and intervention  [] Other    Lola Tenorio

## 2020-01-02 NOTE — CONSULTS
[facility] - Consult Note    Date of  Admission: 1/1/2020  6:59 PM   Primary Care Physician:  Chanda Malik MD      I saw, evaluated, interviewed and examined the patient personally. I agree with the findings and plan of care as documented below with PA-C note  Patient was brought to the emergency department from nursing home with some concern with altered mental status. During normal work-up EKG was performed and there was concern that patient may have a injury pattern on EKG without any cardiac symptoms. Initially STEMI code was called however because of other medical comorbidity, it was canceled and medical management was planned. Patient was made DNR by ER physician and hospitalist team after discussing with family member. Patient still appears to be confused. Patient was transfused because of severe anemia without any obvious source of bleeding at this time. Complaining of right lower extremity pain at amputation site. Does not complain of any chest pain. History is somewhat unreliable  Patient with history of severe CAD with cardiac cath in April 2019 showing severe native three-vessel CAD which was not amenable to PCI or thought was patient is not a candidate for CABG and it would not be beneficial.  Medical management was planned. Patient had a subcutaneous ICD placed in August 2019. We will repeat cardiac enzymes and CBC  Overall patient appears to be suboptimal for aggressive cardiac management  Patient without any obvious fluid overload on exam.  Not a candidate for anticoagulation or antiplatelet because of severe anemia requiring blood transfusion. Blood pressure is borderline low so would avoid aggressive blood pressure management at this time  Will discuss with primary team regarding DNR status and status of hospice care  Will be available. Pls call us if any questions.        Bao Whittaker MD        Assessment:     - Presented with altered mental status  - STEMI activated but cancelled upon review with cardiologist   - Acute blood loss anemia: Hgb 5.3 on admission, s/p PRBC transfusion x2  - CABG 9/21/16 with LIMA- LAD. Other vessels not bypassable   - CAD: per outpatient cardiology records (Chao Galarza): \"NSTEMI s/p cardiac cath 4/23/2019: severe endstage 3V CAD: LCx diffuse prox 90% , previous patent OM is now 100% in ostia. Diffuse disease of LAD after insertion. RCA known chronically occluded.  No vessels suitable for PCI.  CABG would not be beneficial. Continue medical therapy. \" Patent LIMA-LAD  - Acute on chronic HFrEF  - Echo 05/06/19 with EF 20%,   - S/p S-ICD [facility]) 8/30/19 by Dr. Haroldo Warren  - Hypertension  - Hyperlipidemia  - ESRD on HD T/T/S  - Tobacco abuse  - Hx of CVA 2008  - Grade 1 Bilateral ICA Stenosis per Carotid PVL 9/2016  - S/p right BKA       Plan:     - Patient admitted with altered mental status. Cardiology was consulted with concerns of STEMI, though this was cancelled overnight in a call situation which was documented and discussed between ED physician and cardiologist on call. - He has received two transfusions and is pending GI consult  - He had a cardiac cath in April 2019 which showed patent LIMA to LAD but severe native CAD which was not amenable to PCI or surgery, med mgmt was recommended  - Would trend enzymes, repeat limited echo  - There is some consideration for patient to undergo comfort care, will defer to primary team regarding discussion with next of kin  - Patient is on Amiodarone, reports of being on Warfarin prior to admission, have extensively reviewed outpatient EMR Records and no documented hx of afib. Will continue to follow on telemetry, hold Warfarin with possible GI bleed     History of Present Illness: This is a 61 y.o. male admitted for ACS (acute coronary syndrome) ([facility] [I24.9]  ACS (acute coronary syndrome) ([facility] [I24.9].     Patient complains of:  Patient does not reliably provide history    This is a 61year old male with a history of CAD and CABG, ischemic cardiomyopathy, hypertension, diabetes, dyslipidemia, and multiple other co-morbidities, that cardiology is seeing in consult due to ACS. He presented from [facility] with some vague complaints but documentation from ED notes states altered mental status. There were some reports of some phantom limb pain as well, and patient had fallen and was covered in feces. Cardiology has become involved as STEMI was activated, but then based on discussion between ED physician and cardiologist, patient was not taken to lab and treated medically. Heparin was not started due to severe anemia and Hgb 5.3 on admission, and reports of Warfarin use. Currently patient has no complaints but is agitated and restless. Cardiac risk factors: dyslipidemia, diabetes mellitus, male gender, hypertension      Review of systems not obtained due to patient factors.      Past Medical History:     Past Medical History:   Diagnosis Date    CAD (coronary artery disease)     Chronic kidney disease     Chronic pain     back    Diabetes ([facility]     Hypercholesterolemia     Hypertension     Stroke [facility])          Social History:     Social History     Socioeconomic History    Marital status: SINGLE     Spouse name: Not on file    Number of children: Not on file    Years of education: Not on file    Highest education level: Not on file   Tobacco Use    Smoking status: Current Every Day Smoker     Packs/day: 1.00     Years: 39.00     Pack years: 39.00    Smokeless tobacco: Never Used   Substance and Sexual Activity    Alcohol use: No    Drug use: No        Family History:     Family History   Problem Relation Age of Onset    Heart Disease Mother     Hypertension Sister     Hypertension Brother         Medications:   No Known Allergies     Current Facility-Administered Medications   Medication Dose Route Frequency    amiodarone (CORDARONE) tablet 200 mg  200 mg Oral DAILY    nitroglycerin (NITROSTAT) tablet 0.4 mg  0.4 mg SubLINGual Q5MIN PRN    acetaminophen (TYLENOL) tablet 650 mg  650 mg Oral Q4H PRN    ondansetron (ZOFRAN) injection 4 mg  4 mg IntraVENous Q4H PRN    scopolamine (TRANSDERM-SCOP) 1 mg over 3 days 1 Patch  1 Patch TransDERmal Q72H PRN    senna-docusate (PERICOLACE) 8.6-50 mg per tablet 2 Tab  2 Tab Oral BID PRN    bisacodyl (DULCOLAX) suppository 10 mg  10 mg Rectal DAILY PRN    morphine (ROXANOL) 100 mg/5 mL (20 mg/mL) concentrated solution 10 mg  10 mg Oral Q1H PRN    LORazepam (INTENSOL) 2 mg/mL oral concentrate 1 mg  1 mg Oral Q1H PRN    naloxone (NARCAN) injection 0.4 mg  0.4 mg IntraVENous PRN         Physical Exam:     Visit Vitals  /59   Pulse (!) 112   Temp 98.1 °F (36.7 °C)   Resp 20   Ht 5' 4\" (1.626 m)   Wt 121 lb 4.1 oz (55 kg)   SpO2 94%   BMI 20.81 kg/m²     BP Readings from Last 3 Encounters:   01/02/20 103/59   12/30/19 113/68   12/24/19 116/46     Pulse Readings from Last 3 Encounters:   01/02/20 (!) 112   12/30/19 85   12/24/19 85     Wt Readings from Last 3 Encounters:   01/02/20 121 lb 4.1 oz (55 kg)   12/30/19 134 lb (60.8 kg)   12/24/19 134 lb (60.8 kg)       General:  no distress, agitated  Neck:  nontender, no JVD  Lungs:  clear to auscultation bilaterally  Heart:  normal apical impulse  Abdomen:  abdomen is soft without significant tenderness, masses, organomegaly or guarding  Extremities:  extremities normal, atraumatic, no cyanosis or edema  Skin: Warm and dry.  no hyperpigmentation, vitiligo, or suspicious lesions  Neuro: no focal neurological deficits  Psych: non focal     Data Review:     Recent Labs     01/01/20  1935 12/30/19  1210   WBC 13.7* 11.9   HGB 5.3* 7.8*   HCT 17.4* 25.3*    472*     Recent Labs     01/01/20  1935 12/30/19  1210   * 133*   K 4.4 4.4   CL 93* 96*   CO2 25 23   * 97   BUN 88* 82*   CREA 11.10* 15.70*   CA 8.3* 8.2*   ALB 2.5* 2.7*   SGOT 18 24   ALT 20 23   INR 1.0 1.1 Results for orders placed or performed during the hospital encounter of 01/01/20   EKG, 12 LEAD, INITIAL   Result Value Ref Range    Ventricular Rate 117 BPM    Atrial Rate 117 BPM    P-R Interval 172 ms    QRS Duration 120 ms    Q-T Interval 348 ms    QTC Calculation (Bezet) 485 ms    Calculated P Axis 80 degrees    Calculated R Axis 43 degrees    Calculated T Axis 160 degrees    Diagnosis       Critical Test Result: STEMI  Sinus tachycardia  Inferior infarct (cited on or before 27-NOV-2017)  Marked ST abnormality, possible anterolateral subendocardial injury   ACUTE MI / STEMI  Consider right ventricular involvement in acute inferior infarct  Abnormal ECG  When compared with ECG of 27-NOV-2017 20:36,  ST now depressed in Anterior leads  T wave inversion now evident in Anterior leads         All Cardiac Markers in the last 24 hours:    Lab Results   Component Value Date/Time     01/01/2020 07:35 PM    CKMB 8.9 (H) 01/01/2020 07:35 PM    CKND1 4.9 (H) 01/01/2020 07:35 PM    TROIQ 0.35 (H) 01/01/2020 07:35 PM    TNIPOC 0.34 (HH) 01/01/2020 07:33 PM       Last Lipid:    Lab Results   Component Value Date/Time    Cholesterol, total 327 (H) 02/05/2013 02:43 PM    HDL Cholesterol 39 (L) 02/05/2013 02:43 PM    LDL, calculated 231.8 02/05/2013 02:43 PM    Triglyceride 281 (H) 02/05/2013 02:43 PM    CHOL/HDL Ratio 8.4 (H) 02/05/2013 02:43 PM       Signed By: Venkatesh Naik     January 2, 2020

## 2020-01-03 ENCOUNTER — HOME CARE VISIT (OUTPATIENT)
Dept: HOSPICE | Facility: HOSPICE | Age: 60
End: 2020-01-03

## 2020-01-03 LAB
ANION GAP SERPL CALC-SCNC: 13 MMOL/L (ref 3–18)
BUN SERPL-MCNC: 110 MG/DL (ref 7–18)
BUN/CREAT SERPL: 8 (ref 12–20)
CALCIUM SERPL-MCNC: 7.8 MG/DL (ref 8.5–10.1)
CHLORIDE SERPL-SCNC: 102 MMOL/L (ref 100–111)
CO2 SERPL-SCNC: 24 MMOL/L (ref 21–32)
CREAT SERPL-MCNC: 14.3 MG/DL (ref 0.6–1.3)
GLUCOSE SERPL-MCNC: 118 MG/DL (ref 74–99)
HCT VFR BLD AUTO: 17.7 % (ref 36–48)
HGB BLD-MCNC: 6 G/DL (ref 13–16)
HGB BLD-MCNC: 6.2 G/DL (ref 13–16)
HGB BLD-MCNC: 7.5 G/DL (ref 13–16)
POTASSIUM SERPL-SCNC: 5.2 MMOL/L (ref 3.5–5.5)
SODIUM SERPL-SCNC: 139 MMOL/L (ref 136–145)

## 2020-01-03 PROCEDURE — 36430 TRANSFUSION BLD/BLD COMPNT: CPT

## 2020-01-03 PROCEDURE — 90935 HEMODIALYSIS ONE EVALUATION: CPT

## 2020-01-03 PROCEDURE — 85018 HEMOGLOBIN: CPT

## 2020-01-03 PROCEDURE — 5A1D70Z PERFORMANCE OF URINARY FILTRATION, INTERMITTENT, LESS THAN 6 HOURS PER DAY: ICD-10-PCS | Performed by: HOSPITALIST

## 2020-01-03 PROCEDURE — 74011250637 HC RX REV CODE- 250/637: Performed by: HOSPITALIST

## 2020-01-03 PROCEDURE — 80048 BASIC METABOLIC PNL TOTAL CA: CPT

## 2020-01-03 PROCEDURE — 36415 COLL VENOUS BLD VENIPUNCTURE: CPT

## 2020-01-03 PROCEDURE — P9016 RBC LEUKOCYTES REDUCED: HCPCS

## 2020-01-03 PROCEDURE — 65270000029 HC RM PRIVATE

## 2020-01-03 PROCEDURE — 85014 HEMATOCRIT: CPT

## 2020-01-03 RX ORDER — METOPROLOL SUCCINATE 25 MG/1
25 TABLET, EXTENDED RELEASE ORAL 2 TIMES DAILY
COMMUNITY

## 2020-01-03 RX ORDER — CLOPIDOGREL BISULFATE 75 MG/1
75 TABLET ORAL DAILY
COMMUNITY
End: 2020-01-08

## 2020-01-03 RX ORDER — LISINOPRIL 2.5 MG/1
2.5 TABLET ORAL DAILY
COMMUNITY

## 2020-01-03 RX ORDER — SODIUM CHLORIDE 9 MG/ML
50 INJECTION, SOLUTION INTRAVENOUS
Status: DISCONTINUED | OUTPATIENT
Start: 2020-01-03 | End: 2020-01-08 | Stop reason: HOSPADM

## 2020-01-03 RX ORDER — SODIUM CHLORIDE 9 MG/ML
250 INJECTION, SOLUTION INTRAVENOUS AS NEEDED
Status: DISCONTINUED | OUTPATIENT
Start: 2020-01-03 | End: 2020-01-08 | Stop reason: HOSPADM

## 2020-01-03 RX ORDER — GABAPENTIN 300 MG/1
300 CAPSULE ORAL 3 TIMES DAILY
COMMUNITY
End: 2020-01-08

## 2020-01-03 RX ORDER — ATORVASTATIN CALCIUM 80 MG/1
80 TABLET, FILM COATED ORAL DAILY
COMMUNITY

## 2020-01-03 RX ORDER — ISOSORBIDE MONONITRATE 30 MG/1
30 TABLET, EXTENDED RELEASE ORAL DAILY
COMMUNITY
End: 2020-01-08

## 2020-01-03 NOTE — PROGRESS NOTES
Problem: Discharge Planning  Goal: *Discharge to safe environment  Outcome: Progressing Towards Goal   Back to [facility]    Left message for emmanuel,  at [facility] to call me to see if plan to accept pt back.      Also left message yesterday

## 2020-01-03 NOTE — PROGRESS NOTES
Bedside sift report received from [address]: alert but confused; with regular, nonlabored breathing at rest; had a large amount of dark liquid stools; pericare done with outgoing shift    2200: transferred to room 3006 for closer monitoring     2344: consent for blood transfusion obtained from son    0108: blood transfusion initiated as ordered    0204: pt unable to follow instructions on keeping still and rationale for IV heplock and blood transfusion; attempts to pull line out; order for bilateral wrist restraints obtained from Dr. Smiley Nur    0330: blood transfusion ongoing; no respiratory distress noted    0420: had another episode of rectal bleeding, large in amount; екатерина care done; Dr. Smiley Nur made aware    5883: sleeping; occasionally awakens and yells; no respiratory distress noted    0715: Bedside and Verbal shift change report given to Lala Felder (oncoming nurse) by Jonathan Aguila RN (offgoing nurse). Report included the following information SBAR, Kardex, Intake/Output and Recent Results.

## 2020-01-03 NOTE — CONSULTS
Consult Note    Assessment:     1. ESRD. Moderately hypervolemic. Stable electrolytes except mild hyperkalemia. 2. NSTEMI. EF with 20% but no overt decompensated systolic chf.   3. Low GI bleeding. 4. Severe acute blood loss anemia on anemia of ESRD. H/H is minimally better with transfusions. 5. Secondary hyperparathyroidism of ESRD. 6. Altered ms/agitation  on baseline dementia. Patient is known to me, even with interpretor his mental status is poor at baseline. Recommendations:   1. Will finish today's dialysis, pull 1-2 liters if bp tolerates. No heparin with dialysis. 2. Agree with plans for transfusion with dialysis. 3. No need for phos binder at this time. 4. Avoid Gadolinium due to its association with nephrogenic systemic fibrosis in a patients with severe ARF and ESRD. 5. Please dose all medications for  creatinine clearance <15/dialysis. 6. Protect right left  arm from blood pressure checks, blood draws, peripheral iv's. 7.  Agree that comfort care/hospice is a strong consideration. Consult requested by: Vesta Renee MD    ADMIT DATE: 1/1/2020  CONSULT DATE: January 3, 2020                 Admission diagnosis: ACS (acute coronary syndrome) [facility])   Reason for Nephrology Consultation: Management of ESRD  HPI: Kushal Cook is a 61 y.o. male  with h/o of dm, htn, cad, s/p cabg, pad, s/p recent rt foot bka and known diagnosis of ESRD for which he receives hemodialysis on MWF  schedule at [facility] outpatient unit in [facility] under my care. Patient dialyzes for 4 hours. Lt arm avf is used for dialysis access. Last dialysis at outpatient unit was on 12/31 and was uneventful. Patient presented to [facility] with altered ms. In ED he was found to be tachycardic and borderline hypotensive. Patient was found to be severely anemia and underwent transfusions. He since had brpbr. He also r/i for ami, no intervention is planned at this time.  Patient initially was made dnr but subsequently family reversed that decision. Patient was taken to acute dialysis unit where he was seen while on dialysis. Past Medical History:   Diagnosis Date    CAD (coronary artery disease)     Chronic pain     back    Diabetes [address] [address] [address]     ESRD (end stage renal disease) on dialysis ([facility] [address]     alisia [place] in [facility]  Dr. Ricardo Moody    Hypercholesterolemia     Hypertension     Stroke [facility])       Past Surgical History:   Procedure Laterality Date    CARDIAC SURG PROCEDURE UNLIST      angioplasty    HX GI  3-2008    EGD Normal    HX VASCULAR ACCESS      AV fistula       Social History     Socioeconomic History    Marital status: SINGLE     Spouse name: Not on file    Number of children: Not on file    Years of education: Not on file    Highest education level: Not on file   Occupational History    Not on file   Social Needs    Financial resource strain: Not on file    Food insecurity:     Worry: Not on file     Inability: Not on file    Transportation needs:     Medical: Not on file     Non-medical: Not on file   Tobacco Use    Smoking status: Current Every Day Smoker     Packs/day: 1.00     Years: 39.00     Pack years: 39.00    Smokeless tobacco: Never Used   Substance and Sexual Activity    Alcohol use: No    Drug use: No    Sexual activity: Not on file   Lifestyle    Physical activity:     Days per week: Not on file     Minutes per session: Not on file    Stress: Not on file   Relationships    Social connections:     Talks on phone: Not on file     Gets together: Not on file     Attends Yazdanism service: Not on file     Active member of club or organization: Not on file     Attends meetings of clubs or organizations: Not on file     Relationship status: Not on file    Intimate partner violence:     Fear of current or ex partner: Not on file     Emotionally abused: Not on file     Physically abused: Not on file     Forced sexual activity: Not on file Other Topics Concern    Not on file   Social History Narrative    Not on file       Family History   Problem Relation Age of Onset    Heart Disease Mother     Hypertension Sister     Hypertension Brother      No Known Allergies     Home Medications:     Medications Prior to Admission   Medication Sig    atorvastatin (LIPITOR) 80 mg tablet Take 80 mg by mouth daily.  isosorbide mononitrate ER (IMDUR) 30 mg tablet Take 30 mg by mouth daily.  metoprolol succinate (TOPROL-XL) 25 mg XL tablet Take 25 mg by mouth two (2) times a day.  clopidogrel (PLAVIX) 75 mg tab Take 75 mg by mouth daily.  lisinopril (PRINIVIL, ZESTRIL) 2.5 mg tablet Take 2.5 mg by mouth daily.  gabapentin (NEURONTIN) 300 mg capsule Take 300 mg by mouth three (3) times daily.  chlorproMAZINE (THORAZINE) 10 mg tablet Take 10 mg by mouth three (3) times daily.  glimepiride (AMARYL) 1 mg tablet Take 1 mg by mouth Daily (before breakfast).  nitroglycerin (NITROSTAT) 0.4 mg SL tablet by SubLINGual route every five (5) minutes as needed for Chest Pain.  sevelamer carbonate (RENVELA) 800 mg tab tab Take 4 tablets by mouth three times daily with meals and 4 tablets by mouth twice daily with snacks    linagliptin (TRADJENTA) 5 mg tablet Take 5 mg by mouth daily. Indications: type 2 diabetes mellitus    cholecalciferol, vitamin D3, (VITAMIN D3) 2,000 unit tab Take  by mouth daily.  amiodarone (CORDARONE) 200 mg tablet Take 200 mg by mouth daily.  warfarin (COUMADIN) 5 mg tablet Take 1 Tab by mouth daily.  metformin (GLUCOPHAGE) 1,000 mg tablet Take 1,000 mg by mouth two (2) times daily (with meals).  aspirin delayed-release 81 mg tablet Take 81 mg by mouth daily.  fenofibrate nanocrystallized (TRICOR) 145 mg tablet Take 145 mg by mouth daily.        Current Inpatient Medications:     Current Facility-Administered Medications   Medication Dose Route Frequency    0.9% sodium chloride infusion 250 mL  250 mL IntraVENous PRN    0.9% sodium chloride infusion  50 mL/hr IntraVENous DIALYSIS PRN    amiodarone (CORDARONE) tablet 200 mg  200 mg Oral DAILY    nitroglycerin (NITROSTAT) tablet 0.4 mg  0.4 mg SubLINGual Q5MIN PRN    acetaminophen (TYLENOL) tablet 650 mg  650 mg Oral Q4H PRN    ondansetron (ZOFRAN) injection 4 mg  4 mg IntraVENous Q4H PRN    senna-docusate (PERICOLACE) 8.6-50 mg per tablet 2 Tab  2 Tab Oral BID PRN    bisacodyl (DULCOLAX) suppository 10 mg  10 mg Rectal DAILY PRN    naloxone (NARCAN) injection 0.4 mg  0.4 mg IntraVENous PRN    0.9% sodium chloride infusion 250 mL  250 mL IntraVENous PRN       Review of Systems:   Unobtainable. Physical Assessment:     Vitals:    01/03/20 0427 01/03/20 0624 01/03/20 0749 01/03/20 1100   BP: 112/66  96/62 90/55   Pulse: (!) 112  65 99   Resp: 18  18 18   Temp: 98.8 °F (37.1 °C)  98.4 °F (36.9 °C) 97.6 °F (36.4 °C)   SpO2: 97%  99% 92%   Weight:  54.8 kg (120 lb 12.8 oz)     Height:         Last 3 Recorded Weights in this Encounter    01/02/20 0415 01/02/20 1012 01/03/20 0624   Weight: 55 kg (121 lb 4.1 oz) 54.9 kg (121 lb) 54.8 kg (120 lb 12.8 oz)     Admission weight: Weight: 65.8 kg (145 lb) (01/01/20 1903)      Intake/Output Summary (Last 24 hours) at 1/3/2020 1553  Last data filed at 1/3/2020 0909  Gross per 24 hour   Intake 440 ml   Output 0 ml   Net 440 ml       Patient is in no apparent distress but appears agitated. HEENT: Head is normocephalic and atraumatic. Pupils are round, equal, reactive to light. Sclerae are anicteric. Oropharynx clear. Neck: no cervical lymphadenopathy or thyromegaly. Lungs: good air entry, bl exp rhonchi. Trachea at the midline. Cardiovascular system: S1, S2, regular rate and rhythm. No murmurs, gallops or rubs. No jvd. Carotid upstroke 2 + bilaterally. Abdomen: soft, non tender, non distended. Positive bowel sounds. No hepatosplenomegaly. No abdominal bruits. Extremities: no clubbing, cyanosis or edema. Rt bka dressings intact. Lt foot cool. Integumentary: lt fifth toe with ulceration on the tip. Neurologic: Alert, answers \"good\" to any question. Dialysis access: lt arm avf, cannulated. Data Review:    Labs: Results:       Chemistry Recent Labs     01/03/20 0715 01/01/20 1935   * 141*    135*   K 5.2 4.4    93*   CO2 24 25   * 88*   CREA 14.30* 11.10*   CA 7.8* 8.3*   AGAP 13 17   BUCR 8* 8*   AP  --  76   TP  --  7.4   ALB  --  2.5*   GLOB  --  4.9*   AGRAT  --  0.5*         CBC w/Diff Recent Labs     01/03/20  0715 01/02/20  2145 01/02/20  1130 01/01/20 1935   WBC  --  16.6* 10.7 13.7*   RBC  --  1.95* 1.70* 1.90*   HGB 6.2* 5.7* 5.0* 5.3*   HCT  --  17.7* 15.3* 17.4*   PLT  --  323 262 419   GRANS  --  89*  --  83*   LYMPH  --  6*  --  6*   EOS  --  2  --  2         Iron/Ferritin No results for input(s): IRON in the last 72 hours. No lab exists for component: TIBCCALC   PTH/VIT D No results for input(s): PTH in the last 72 hours.     No lab exists for component: VITD            Naveen Rodriguez M.D  [facility]  Office [phone]  Pager [phone]    January 3, 2020

## 2020-01-03 NOTE — PROGRESS NOTES
1011 Results for Day Kearns (MRN [id number]) as of 1/3/2020 10:11   Ref. Range 1/3/2020 07:15   HGB Latest Ref Range: 13.0 - 16.0 g/dL 6.2 (L)     Katelyn ALATORRE made aware. 26 Pt naseem to dialysis    1515 Dialysis nurse unable to perform dialysis as pt is too active in bed and pulling things even though he has wrist restraints. Katelyn ALATORRE made aware. Mitten restraints applied     [phone] Written shift change report given to Lucero AGUIAR (oncoming nurse) by Tatianna Thacker RN (offgoing nurse). Report included the following information SBAR, Kardex, ED Summary, Intake/Output, MAR and Recent Results.

## 2020-01-03 NOTE — PROGRESS NOTES
Progress Note      Patient: Jose Olvera               Sex: male          DOA: 1/1/2020       YOB: 1960      Age:  61 y.o.        LOS:  LOS: 2 days             CHIEF COMPLAINT:   ACS with GI bleed in the setting of ESRD    Subjective:     Patient getting set up for HD    Objective:      Visit Vitals  BP 90/55 (BP 1 Location: Right arm)   Pulse 99   Temp 97.6 °F (36.4 °C)   Resp 18   Ht 5' 4\" (1.626 m)   Wt 54.8 kg (120 lb 12.8 oz)   SpO2 92%   BMI 20.74 kg/m²       Physical Exam:  Gen:  No distress, no complaint  Lungs:  Clear bilaterally, no wheeze or rhonchi  Heart:  Regular rate and rhythm, no murmurs or gallops  Abdomen:  Soft, non-tender, normal bowel sounds        Lab/Data Reviewed:  BMP:   Lab Results   Component Value Date/Time     01/03/2020 07:15 AM    K 5.2 01/03/2020 07:15 AM     01/03/2020 07:15 AM    CO2 24 01/03/2020 07:15 AM    AGAP 13 01/03/2020 07:15 AM     (H) 01/03/2020 07:15 AM     (H) 01/03/2020 07:15 AM    CREA 14.30 (H) 01/03/2020 07:15 AM    GFRAA 4 (L) 01/03/2020 07:15 AM    GFRNA 4 (L) 01/03/2020 07:15 AM     CBC:   Lab Results   Component Value Date/Time    WBC 16.6 (H) 01/02/2020 09:45 PM    HGB 6.2 (L) 01/03/2020 07:15 AM    HCT 17.7 (LL) 01/02/2020 09:45 PM     01/02/2020 09:45 PM           Assessment/Plan     Principal Problem:    ACS (acute coronary syndrome) ([facility] [address] (1/1/2020)    Active Problems:    GI bleed (1/1/2020)      Acute blood loss anemia (1/1/2020)      DM (diabetes mellitus) ([facility] [address] (1/1/2020)      ESRD (end stage renal disease) ([facility] [address] (1/1/2020)      HTN (hypertension) (1/1/2020)      HLD (hyperlipidemia) (1/1/2020)        Plan:  Patient getting blood transfusion  Follow H/H after transfusion  Patient has been poor candidate for cardiac intervention previously.   This continues  Cardiology consulted  BS control  BP control  Follow mental status

## 2020-01-03 NOTE — DIALYSIS
[facility]   ACUTE HEMODIALYSIS FLOW SHEET   PATIENT INFORMATION           Name:Juni Shahid          MRN: [id number]      TCQ:[id number]  TX# [id number]          Hospital: [place] [address] DX: ACS, ESRD          []1st Time Acute  []Stat[x] Routine []Urgent []Chronic Unit          [x]Acute Room []Bedside  []ICU/CCU []ER         Isolation Precautions: [x]Dialysis[] Airborne []Contact []Droplet []Reverse          Special Considerations:    [x] Blood Consent Verified  []N/A         Allergies:  [] NKA  [x] Reviewed  No Known Allergies   Code Status [x]Full Code [] DNR  []Other        Diet:  Diabetic: []Yes []No      HEMODIALYSIS ORDERS         Physician: Sydney [address] Duration 4 BFR: 400 DFR:800    Dialysate: K 2 CA 2.5 Na 140 Bicarb 35        Dry Weight: UF Goal: 2000 ml         Heparin:  []Bolus   Units    []Hourly  Units      [x]None       BP Tx:  []NS  200ml/Bolus    []Other     []N/A       Labs: Pre:  Post: []N/A             Additional Orders: []N/A          [x]Signed Treatment Consent   [x] Verified   [x] Obtained    PRIMARY NURSE REPORT: FIRST INITIAL/ LAST NAME/TITLE        PRE DIALYSIS: Nano    TIME: 1400    ACCESS   CATHETER ACCESS:  [x] N/A  [] RIGHT  [] LEFT  [] IJ  [] SUBCL [] FEM                          [] First use X-ray  [] Tunnel     [] Non-Tunneled                          [] No S/S infection  [] Redness [] Drainage  [] Cultured [] Swelling                         [] Pain                          [] Medical Aseptic [] Prep Dressing Changed                          [] Clotted [] Patent []      Flows: [] Good [] Poor [] Reversed             If Access Problem Dr. Gar Nones: [] Yes [] No    Date:    [] N/A        GRAFT/FISTULA ACCESS:  [] N/A  [] RIGHT  [x] LEFT  [x] UE  [] LE                           [] AVG  [x] AVF [] BUTTONHOLE  [x] +BRUIT/THRILL       [] MEDICAL ASEPTIC PREP   [x] No S/S infection  [x] Redness [] Drainage  [] Cultured [] Swelling [] Pain                          Needle Gauge 15                             Length: 1    If Access Problem Dr. Alden Verduzco: [] Yes [] No    Date:     [] N/A   GENERAL ASSESSMENT        LUNGS:  SaO2%  [] Clear [] Coarse [] Crackles [] Wheezing               [] Diminished Location: [] RLL [] LLL [] RUL [] JANELL         COUGH:  [] Productive [] Dry [] N/A  RESPIRATIONS: [] Easy [] Labored        THERAPY: [x] RA   [] NC     L/min    Mask: [] NRB [] Venti  O2%                  [] Ventilator [] Intubated [] Trach [] BiPap [] CPap       CARDIAC: [x] Regular [] Irregular [] Pericardial Rub [] JVD                [] Monitored Rhythm:        EDEMA: [] None [x] Generalized [] Facial [] Pedal [] UE [] LE             [] Pitting [] 1 [] 2 [] 3 [] 4    [] Right [] Left [] Bilateral       SKIN:    [x] Warm [] Hot [] Cold  [x] Dry [] Pale [] Diaphoretic              [] Flushed [] Jaundiced [] Cyanotic [] Rash [] Weeping         LOC:    [] Alert  Oriented to: [] Person [] Place [] Time             [x] Confused [] Lethargic [] Medically sedated [] Non-responsive        GI/ABDOMEN: [] Flat [] Distended [x] Soft [] Firm [] Obese [] Diarrhea   [x] Bowel Sounds     []Nausea [] Vomiting         PAIN: [] 0 [] 1 [] 2 [] 3 [] 4 [] 5 [] 6 [] 7 [] 8 [] 9 [] 10          Scale 1-10 Action/Follow Up        MOBILITY: [] Amb [] Amb/Assist [x] Bed  [] Wheelchair    CURRENT LABS   HBsAg ONLY: Date Drawn 11/26/2019 [x] Negative [] Positive  [] Unknown.      HBsAb: Date Drawn 11/26/2019 [] Susceptible <10 [x] Immune ?10 [] Unknown       Date of Current Labs:        Lab Results   Component Value Date/Time    Sodium 139 01/03/2020 07:15 AM    Potassium 5.2 01/03/2020 07:15 AM    Chloride 102 01/03/2020 07:15 AM    CO2 24 01/03/2020 07:15 AM     (H) 01/03/2020 07:15 AM    Creatinine 14.30 (H) 01/03/2020 07:15 AM    Calcium 7.8 (L) 01/03/2020 07:15 AM    HCT 17.7 (LL) 01/02/2020 09:45 PM    HGB 6.2 (L) 01/03/2020 07:15 AM    PLATELET 652 90/71/4976 09:45 PM    INR 1.0 01/01/2020 07:35 PM      EDUCATION        Person Educated: [x] Patient [] Family [] Other         Knowledge base: [x] None [] Minimal [] Substantial         Barriers to learning  confusion  [] None         Preferred method of learning: [] Written [] Oral [] Visual [] Hands on        Topic: [] Access Care [] S&S of infection [] Fluid Management [] K+                 [x] Procedural    [] Albumin [] Medications [] Tx Options [] Transplant                  [] Diet [] Other         Teaching Tools: [x] Explain [] Demonstration [] Handout [x] Teachback   RO/HEMODIAYLSIS MACHINE SAFETY [facility] Serial #   [] # 1 [id number]   [] # 2 [id number]    [x] # 3 [id number]   [] # 4 [id number]   [] # 5 [id number]   [] # 2 [id number]   [] # 7 [id number]      Alarm Test: [x] Pass Time 1500     RO Serial #   [] [id number] (Large dual station RO)  [] # 1  [id number]  (Portable # 1)  [] # 2  [id number]  (Portable # 2)  [x] # 3  [id number]  (Portable # 3)  [] # 4  [id number]  (Portable # 4)  [] # 5  [id number]  (Portable # 5)       Lot #s: Dialyzer [id number] exp. 07/18/2022  Tubing 73G07-5  exp. 07/31/2024  Saline -JT exp.07/21   [x] RO/Machine Log Complete    [x] Extracorporeal circuit Tested for integrity       Dialysate: pH 7.4 Temp. 36  Conductivity: Meter 14.1 HD Machine 14.3   CHLORINE TESTING- BEFORE EACH TREATMENT AND EVERY 4 HOURS       Total Chlorine: [x] Less than 0.1 ppm Time: 1500 2nd Check Time: 1900  (If greater than 0.1 ppm from Primary then every 30 minutes from Secondary)   TREATMENT INIATION-WITH DIALYSIS PRECAUTIONS   [x] All Connections Secured   [x] Saline Line Double Clamped     [x] Venous Parameters Set    [x] Arterial Parameters Set    [x] Prime Given  ml            [x] Air Foam Detector Engaged        [facility] Nurse Signature/Title_Shilo Hauser RN__    PRE-TREATMENT   Time 1513 B/P 118/100 HR 72 Temp. 98.0 Resp Rate 20 Pre Wt   UF Calculations:    Wt to lose:1500 ml(+) Oral:  ml(+) IV Meds/Fluids/Blood prods 300 ml(+)  Prime/Rinse 500 ml  (=)Total UF Goal 2000 mL   Scale Type:[] Bed scale [] Sling Scale [] Wheel Chair Scale [x]  Not Ordered [] Unable to obtain pt on stretcher   Tx Initiation Note: Patient arrived the unit in sof bilateral arm restraint, confused, restless and pulling on restraint able to take off his gown. RN notified to request meds for sedation from MD but no ne orders given. Mitten applied to r hand to reduce the risk of pulling on lines. Dr. Truman Rodriguez aware and Dr. Chantal Rock aware. Left arm AVF accessed, HD initiated with close monitor of the patient. During treatment: 1 Unit PRBC given   [x] Time Out/Safety Check  Time: 9940    [facility] Signature/Title_Shilo Hauser RN   INTRADIALYTIC MONITORING  (SEE ATTACHED FLOWSHEET)   POST TREATMENT    Time 1931 B/P 106/82 HR 76 Temp 98.5 Resp. Rate 20 Post wt    Time Medication Dose Volume Route Initials                                   [facility] Signatures Title Initials Time   Sruthi Gordillo RN   RN EO 2030               [facility] Signature/Title:_Shilo Hauser RN_                                  Dialyzer cleared: [] Good [] Fair [] Poor    Blood Processed 82.5 Liters            Net UF Removed 1500 mL    Post Tx Access:    AVF/AVG: Bleeding Stop                   Art.15 min Ven15 min []+bruit/thrill                             Catheter: Locking Solution      Art.  ml Karlo  ml    Post Assessment:  Skin: [x]Warm [x]Dry []Diaphoretic []Flushed [] Pale []Cyanotic          Lungs: []Clear [x]Coarse []Crackles []Wheezing          Cardiac: []Regular []Irregular  [x]Monitored rhythm          Edema: []None [x]General []Facial []Pedal  []UE []LE []RIGHT                      []LEFT    []Bilateral      Pain: [x]0 []1[]2 []3 []4 []5 []6 []7 []8 []9 []10   POST Tx Note: Patient completed 4hrs of HD. 1500 mls fluid removed. 1unit PRBC given. Patient still confused.  Patient had large amount of bloody stool end at end of treatment, patient returned to the room to be cleaned. Report given to primary nurse.    Primary Nurse Report: First initial/Last name/Title    Post Dialysis:   Sheri Young       Time: 8575   Abbreviations: AVG-arterial venous graft, AVF-arterial venous fistula, IJ-Internal Jugular,  Subcl-Subclavian, Fem-Femoral, Tx-treatment, AP/HR-apical heart rate, DFR-dialysate flow rate, BFR-blood flow rate, AP-arterial pressure, -venous pressure, UF-ultrafiltrate, TMP-transmembrane pressure, Karol-Venous, Art-Arterial, RO-Reverse Osmosis

## 2020-01-03 NOTE — HOSPICE
Spoke with Shanthi Torres by phone about hospice services. Per Prasad \"me and the doctor had a misunderstanding in the emergency room. I want my dad to get better, full treatment and no hospice. \"     Informed Ha Johansen. Thank you for the referral to [facility]. Please contact [phone] with any questions or concerns.         ALIZA AcevesN, RN  Clinical Coordinator  [place] [address]., [address] [place] [address] Str.  [phone]

## 2020-01-03 NOTE — PROGRESS NOTES
Admission Medication Reconciliation:    Information obtained from: Rx [facility]    Chief Complaint for this Admission:  Possible STEMI    Significant PMH/Disease States:   Past Medical History:   Diagnosis Date    CAD (coronary artery disease)     Chronic kidney disease     Chronic pain     back    Diabetes ([address]     Hypercholesterolemia     Hypertension     Stroke [facility])        Allergies:  Patient has no known allergies. Prior to Admission Medications:   Prior to Admission Medications   Prescriptions Last Dose Informant Patient Reported? Taking?   atovastatin (LIPITOR) 80 mg tablet  Sig: Take 80 mg by mouth daily. Yes Yes   clopidogrel (PLAVIX) 75 mg tab  Sig: Take 75 mg by mouth daily. Yes Yes   fenofibrate nanocrystallized (TRICOR) 145 mg tablet   Yes Yes   Sig: Take 145 mg by mouth daily. gabapentin (NEURONTIN) 300 mg capsule  Sig: Take 300 mg by mouth three (3) times daily. Yes Yes   isosorbide mononitrate ER (IMDUR) 30 mg tablet  Sig: Take 30 mg by mouth daily. Yes Yes   lisinopril (PRINIVIL, ZESTRIL) 2.5 mg tablet  Sig: Take 2.5 mg by mouth daily. Yes Yes   metoprolol succinate (TOPROL-XL) 25 mg XL tablet  Sig: Take 25 mg by mouth two (2) times a day. Yes Yes   nitroglycerin (NITROSTAT) 0.4 mg SL tablet  Sig: by SubLINGual route every five (5) minutes as needed for Chest Pain. Yes Yes   sevelamer carbonate (RENVELA) 800 mg tab tab  Sig: Take 4 tablets by mouth three times daily with meals and 4 tablets by mouth twice daily with snacks   Yes Yes               aspirin delayed-release 81 mg tablet  Sig: Take 81 mg by mouth daily. Unknown at Unknown time  No Unknown   amiodarone (CORDARONE) 200 mg tablet  Sig: Take 200 mg by mouth daily. Unknown at Unknown time  No Unknown   chlorproMAZINE (THORAZINE) 10 mg tablet  Sig: Take 10 mg by mouth three (3) times daily.  Unknown at Unknown time  No Unknown   cholecalciferol, vitamin D3, (VITAMIN D3) 2,000 unit tab  Sig: Take  by mouth daily. Unknown at Unknown time  No Unknown   glimepiride (AMARYL) 1 mg tablet  Sig: Take 1 mg by mouth Daily (before breakfast). Unknown at Unknown time  No Unknown   linagliptin (TRADJENTA) 5 mg tablet  Sig: Take 5 mg by mouth daily. Indications: type 2 diabetes mellitus Unknown at Unknown time  No Unknown   metformin (GLUCOPHAGE) 1,000 mg tablet  Sig: Take 1,000 mg by mouth two (2) times daily (with meals). Unknown at Unknown time  No Unknown   warfarin (COUMADIN) 5 mg tablet  Sig: Take 1 Tab by mouth daily. Unknown at Unknown time  No Unknown      Facility-Administered Medications: None         Physician/Provider who completed:    Comments/Recommendations: Med Rec is complete* (as best as possible). Patient isn't able to communicate at this time and family members are difficult to reach out to. Wife is currently in the [facility]. Meds that were listed under the PTA med list but were not recently picked up at his pharmacy are at the bottom of the list. Current meds are at the top. Please use as you see fit. Thank you.         Alan

## 2020-01-03 NOTE — PROGRESS NOTES
Problem: Non-Violent Restraints  Goal: *Removal from restraints as soon as assessed to be safe  Outcome: Progressing Towards Goal  Goal: *No harm/injury to patient while restraints in use  Outcome: Progressing Towards Goal  Goal: *Patient's dignity will be maintained  Outcome: Progressing Towards Goal  Goal: *Patient Specific Goal (EDIT GOAL, INSERT TEXT)  Outcome: Progressing Towards Goal  Goal: Non-violent Restaints:Standard Interventions  Outcome: Progressing Towards Goal  Goal: Non-violent Restraints:Patient Interventions  Outcome: Progressing Towards Goal  Goal: Patient/Family Education  Outcome: Progressing Towards Goal     Problem: Discharge Planning  Goal: *Discharge to safe environment  Outcome: Progressing Towards Goal     Problem: Falls - Risk of  Goal: *Absence of Falls  Description  Document Aguilat President Fall Risk and appropriate interventions in the flowsheet. Outcome: Progressing Towards Goal  Note: Fall Risk Interventions:       Mentation Interventions: Bed/chair exit alarm, Adequate sleep, hydration, pain control, Door open when patient unattended, Familiar objects from home, More frequent rounding, Toileting rounds, Update white board    Medication Interventions: Bed/chair exit alarm, Evaluate medications/consider consulting pharmacy    Elimination Interventions: Bed/chair exit alarm, Toileting schedule/hourly rounds    History of Falls Interventions: Bed/chair exit alarm, Door open when patient unattended, Evaluate medications/consider consulting pharmacy         Problem: Patient Education: Go to Patient Education Activity  Goal: Patient/Family Education  Outcome: Progressing Towards Goal     Problem: Pressure Injury - Risk of  Goal: *Prevention of pressure injury  Description  Document Enrique Scale and appropriate interventions in the flowsheet.   Outcome: Progressing Towards Goal  Note: Pressure Injury Interventions:  Sensory Interventions: Assess changes in LOC, Check visual cues for pain, Keep linens dry and wrinkle-free, Minimize linen layers    Moisture Interventions: Absorbent underpads, Apply protective barrier, creams and emollients, Limit adult briefs, Maintain skin hydration (lotion/cream)    Activity Interventions: Increase time out of bed, Pressure redistribution bed/mattress(bed type)    Mobility Interventions: HOB 30 degrees or less, Pressure redistribution bed/mattress (bed type)    Nutrition Interventions: Document food/fluid/supplement intake, Offer support with meals,snacks and hydration    Friction and Shear Interventions: HOB 30 degrees or less, Foam dressings/transparent film/skin sealants, Apply protective barrier, creams and emollients                Problem: Patient Education: Go to Patient Education Activity  Goal: Patient/Family Education  Outcome: Progressing Towards Goal     Problem: Pain  Goal: *Control of Pain  Outcome: Progressing Towards Goal     Problem: Patient Education: Go to Patient Education Activity  Goal: Patient/Family Education  Outcome: Progressing Towards Goal     Problem: Anemia Care Plan (Adult and Pediatric)  Goal: *Labs within defined limits  Outcome: Progressing Towards Goal  Goal: *Tolerates increased activity  Outcome: Progressing Towards Goal     Problem: Patient Education: Go to Patient Education Activity  Goal: Patient/Family Education  Outcome: Progressing Towards Goal     Problem: Discharge Planning  Goal: *Discharge to safe environment  Outcome: Progressing Towards Goal     Problem: Nutrition Deficit  Goal: *Optimize nutritional status  Outcome: Progressing Towards Goal

## 2020-01-04 LAB
ABO + RH BLD: NORMAL
ANION GAP SERPL CALC-SCNC: 11 MMOL/L (ref 3–18)
BLD PROD TYP BPU: NORMAL
BLOOD GROUP ANTIBODIES SERPL: NORMAL
BPU ID: NORMAL
BUN SERPL-MCNC: 36 MG/DL (ref 7–18)
BUN/CREAT SERPL: 6 (ref 12–20)
CALCIUM SERPL-MCNC: 7.9 MG/DL (ref 8.5–10.1)
CALLED TO:,BCALL1: NORMAL
CALLED TO:,BCALL2: NORMAL
CHLORIDE SERPL-SCNC: 100 MMOL/L (ref 100–111)
CO2 SERPL-SCNC: 28 MMOL/L (ref 21–32)
CREAT SERPL-MCNC: 6.47 MG/DL (ref 0.6–1.3)
CROSSMATCH RESULT,%XM: NORMAL
GLUCOSE SERPL-MCNC: 83 MG/DL (ref 74–99)
HGB BLD-MCNC: 7.9 G/DL (ref 13–16)
HGB BLD-MCNC: 8 G/DL (ref 13–16)
POTASSIUM SERPL-SCNC: 4 MMOL/L (ref 3.5–5.5)
SODIUM SERPL-SCNC: 139 MMOL/L (ref 136–145)
SPECIMEN EXP DATE BLD: NORMAL
STATUS OF UNIT,%ST: NORMAL
UNIT DIVISION, %UDIV: 0

## 2020-01-04 PROCEDURE — 36415 COLL VENOUS BLD VENIPUNCTURE: CPT

## 2020-01-04 PROCEDURE — 85018 HEMOGLOBIN: CPT

## 2020-01-04 PROCEDURE — 80048 BASIC METABOLIC PNL TOTAL CA: CPT

## 2020-01-04 PROCEDURE — 65270000029 HC RM PRIVATE

## 2020-01-04 PROCEDURE — 77030037878 HC DRSG MEPILEX >48IN BORD MOLN -B

## 2020-01-04 PROCEDURE — 74011250637 HC RX REV CODE- 250/637: Performed by: HOSPITALIST

## 2020-01-04 RX ADMIN — AMIODARONE HYDROCHLORIDE 200 MG: 200 TABLET ORAL at 10:37

## 2020-01-04 NOTE — PROGRESS NOTES
1915 Bedside, Verbal and Written shift change report given to 1921 August Corrales (oncoming nurse) by Misael Freeman (offgoing nurse). Report included the following information SBAR, Kardex, Intake/Output, MAR and Recent Results. Patient returned from dialysis, remains in soft limb wrist restraints, large bloody stool noted. Incontinence care performed, restraint assessment performed. Patient alert but only oriented to self, family at bed to aid in communication since patients baseline is limited [address] Feeding assistance provided. Patient resting comfortably in bed. 0000 Shift reassessment, pt restless, attempting to pull off mitts and scratch at skin, declined fluids/snack, will continue to monitor. 0400  Shift reassessment, pt restless, restraints in place, will continue to monitor. 0750 Bedside, Verbal and Written shift change report given to [place] [address] (oncoming nurse) by Sussy AGUIAR (offgoing nurse). Report included the following information SBAR, Kardex, Intake/Output, MAR and Recent Results. Skin assessment completed.

## 2020-01-04 NOTE — ROUTINE PROCESS
1900 Assumed care of pt at 1500. .. Pt in dialysis. .. Pt returned from dialysis. . Report given to 1921 August Lowe.. ...

## 2020-01-04 NOTE — PROGRESS NOTES
NUTRITION FOLLOW UP/PLAN OF CARE      RECOMMENDATIONS:   1. Continue current diet, will send ensure enlive TID  2. Monitor labs, weight and PO intake  3. Feed pt at all meals and document PO intake    GOALS:   1. PO intake meets >75% of protein/calorie needs by 1/7/20  2. Weight Maintenance (+/- 1-2 lb) by 1/11/20       ASSESSMENT:   Wt status is classified as normal per adjusted BMI of 19.5 for amputation. Poor intake - pt needs to be fed at all meals until AMS resolved. Diagnosis: ACS, anemia, ESRD on HD, HTN, HLD, DM, GI bleed. Nutrition recommendations listed. RD to follow. Nutrition Diagnoses:   Remains appropriate: Inadequate intake related to AMS as evidenced by 0% intake at lunch per tray visual    SUBJECTIVE/OBJECTIVE:   (1/4/20) Pt in bed, fully unclothed, currently with restraints. Per reports pt consumed 25% of breakfast, lunch untouched per tray visual. Informed nursing of feeding patient at all meals. No reports of n/v. Pt with rectal bleeding 1/3. Skin: scroctum/toe/pretibial wound noted in wound LDA. Will send ensure enlive TID. Continue current diet, Feed pt at all meals. Will continue to monitor intake, weight, labs.    (1/2/20) Pt seen for an initial assessment/ESRD on HD/low BMI. Pt admit with AMS. Pt resting in bed during time of assessment not responding appropriately to questions. Pt is a resident of [facility]. Pt with lunch tray in room untouched. Rec: feed pt at all meals until AMS improves. Adjusted BMI for amputation:19.5. BKA 12/11/19. Hospice consult placed. Feed pt at all meals and document PO intake. Will continue to monitor intake, weight, labs, POC. Information Obtained from:    [x] Chart Review   [x] Patient   [] Family/Caregiver   [x] Nurse/Physician   [] Interdisciplinary Meeting/Rounds    Diet: dental soft solid  Medications: [x] Reviewed  Noted: cordarone PRN: pericolace  Allergies: [x] Reviewed   Encounter Diagnoses     ICD-10-CM ICD-9-CM   1. Gastrointestinal hemorrhage, unspecified gastrointestinal hemorrhage type K92.2 578.9   2. STEMI (ST elevation myocardial infarction) ([facility] I21.3 410.90   3. Anemia, unspecified type D64.9 285.9   4. Elevated troponin R79.89 790.6     Past Medical History:   Diagnosis Date    CAD (coronary artery disease)     Chronic pain     back    Diabetes ([facility]     ESRD (end stage renal disease) on dialysis ([facility]     [place] [facility] [place]    Hypercholesterolemia     Hypertension     Stroke [facility])       Labs:    Lab Results   Component Value Date/Time    Sodium 139 01/04/2020 03:44 AM    Potassium 4.0 01/04/2020 03:44 AM    Chloride 100 01/04/2020 03:44 AM    CO2 28 01/04/2020 03:44 AM    Anion gap 11 01/04/2020 03:44 AM    Glucose 83 01/04/2020 03:44 AM    BUN 36 (H) 01/04/2020 03:44 AM    Creatinine 6.47 (H) 01/04/2020 03:44 AM    Calcium 7.9 (L) 01/04/2020 03:44 AM    Albumin 2.5 (L) 01/01/2020 07:35 PM     Lab Results   Component Value Date/Time    GLU 83 01/04/2020 03:44 AM     Lab Results   Component Value Date/Time    Cholesterol, total 327 (H) 02/05/2013 02:43 PM    HDL Cholesterol 39 (L) 02/05/2013 02:43 PM    LDL, calculated 231.8 02/05/2013 02:43 PM    VLDL, calculated 56.2 02/05/2013 02:43 PM    Triglyceride 281 (H) 02/05/2013 02:43 PM    CHOL/HDL Ratio 8.4 (H) 02/05/2013 02:43 PM     Anthropometrics: BMI (calculated): 20  Last 3 Recorded Weights in this Encounter    01/02/20 1012 01/03/20 0624 01/04/20 0425   Weight: 54.9 kg (121 lb) 54.8 kg (120 lb 12.8 oz) 53 kg (116 lb 12.8 oz)      Ht Readings from Last 1 Encounters:   01/02/20 5' 4\" (1.626 m)     Documented Weight History:  Weight Metrics 1/4/2020 12/30/2019/2019 12/24/2019 11/27/2017 8/28/2017 2/18/2010   Weight 116 lb 12.8 oz 134 lb 134 lb 167 lb 166 lb 0.1 oz 173 lb   BMI 20.05 kg/m2 23.74 kg/m2 23.74 kg/m2 29.58 kg/m2 29.41 kg/m2 29.68 kg/m2     Patient Vitals for the past 100 hrs:   % Diet Eaten   01/04/20 1016 25 %       IBW: 122lb (based on amputation)  [x] Weight Loss [] Weight Gain [] Weight Stable    Estimated Nutrition Needs: [] MSJ  [x] Other:  Calories: 8391-1307 kcal (30-35 kcal/kg) Based on:   [x] Actual BW    Protein:   66-72 g (1.2-1.3 g/kg) Based on:   [x] Actual BW    Fluid:       Urine output + 1000 mL     [x] No Cultural, Scientology or ethnic dietary need identified. [] Cultural, Scientology and ethnic food preferences identified and addressed     Wt Status:  [x] Normal (18.6 - 24.9) [] Underweight (<18.5) [] Overweight (25 - 29.9) [] Mild Obesity (30 - 34.9)  [] Moderate Obesity (35 - 39.9) [] Morbid Obesity (40+)     Nutrition Problems Identified:   [x] Suboptimal PO intake   [] Food Allergies  [] Difficulty chewing/swallowing/poor dentition  [] Constipation/Diarrhea   [] Nausea/Vomiting   [] None  [] Other:     Plan:   [] Therapeutic Diet  []  Obtained/adjusted food preferences/tolerances and/or snacks options   []  Supplements added   [] Occupational therapy following for feeding techniques  []  HS snack added   [x]  Modify diet texture   []  Modify diet for food allergies   []  Educate patient   [x]  Assist pt at meals  [x]  Monitor PO intake on meal rounds   [x]  Continue inpatient monitoring and intervention   [x]  Participated in discharge planning/Interdisciplinary rounds/Team meetings   []  Other:     Education Needs:   [x] Not appropriate for teaching at this time due to:  AMS   [] Identified and addressed    Nutrition Monitoring and Evaluation:  [x] Continue ongoing monitoring and intervention  [] Ryanne Arrieta

## 2020-01-04 NOTE — PROGRESS NOTES
Internal Medicine Progress Note    Patient's Name: Nadine Paulino Date: 1/1/2020  Length of Stay: 3      Assessment/Plan     C/[address] Problems    Diagnosis Date Noted    Acute blood loss anemia 01/01/2020    ESRD (end stage renal disease) ([facility] 01/01/2020    HTN (hypertension) 01/01/2020    HLD (hyperlipidemia) 01/01/2020    DM (diabetes mellitus) ([facility] 01/01/2020    ACS (acute coronary syndrome) ([facility] 01/01/2020    GI bleed 01/01/2020     - Hgb stable  - Cont to monitor H&H for now  - Cont diet  - Dialysis per nephro  - No [place] for [place] per cardio  - Medical mgmt for now for both GI bleed and ACS  - PT/OT  - Cont acceptable home medications for chronic conditions   - DVT protocol    I have personally reviewed all pertinent labs and films that have officially resulted over the last 24 hours. I have personally checked for all pending labs that are awaiting final results.     Subjective     Pt s/e @ bedside  No major events overnight  No complaints but confused at times  Denies CP or SOB    Objective     Visit Vitals  /77 (BP 1 Location: Right arm, BP Patient Position: At rest)   Pulse 75   Temp 98.1 °F (36.7 °C)   Resp 18   Ht 5' 4\" (1.626 m)   Wt 53 kg (116 lb 12.8 oz)   SpO2 100%   BMI 20.05 kg/m²       Physical Exam:  General Appearance: NAD, confused but pleasant  Lungs: CTA with normal respiratory effort  CV: RRR, no m/r/g  Abdomen: soft, non-tender, normal bowel sounds  Extremities: no cyanosis, no peripheral edema  Neuro: No focal deficits, motor/sensory intact    Lab/Data Reviewed:  BMP:   Lab Results   Component Value Date/Time     01/04/2020 03:44 AM    K 4.0 01/04/2020 03:44 AM     01/04/2020 03:44 AM    CO2 28 01/04/2020 03:44 AM    AGAP 11 01/04/2020 03:44 AM    GLU 83 01/04/2020 03:44 AM    BUN 36 (H) 01/04/2020 03:44 AM    CREA 6.47 (H) 01/04/2020 03:44 AM    GFRAA 11 (L) 01/04/2020 03:44 AM    GFRNA 9 (L) 01/04/2020 03:44 AM     CBC:   Lab Results Component Value Date/Time    HGB 7.9 (L) 01/04/2020 03:44 AM    HCT 17.7 (LL) 01/03/2020 04:05 PM       Imaging Reviewed:  No results found.     Medications Reviewed:  Current Facility-Administered Medications   Medication Dose Route Frequency    0.9% sodium chloride infusion 250 mL  250 mL IntraVENous PRN    0.9% sodium chloride infusion  50 mL/hr IntraVENous DIALYSIS PRN    amiodarone (CORDARONE) tablet 200 mg  200 mg Oral DAILY    nitroglycerin (NITROSTAT) tablet 0.4 mg  0.4 mg SubLINGual Q5MIN PRN    acetaminophen (TYLENOL) tablet 650 mg  650 mg Oral Q4H PRN    ondansetron (ZOFRAN) injection 4 mg  4 mg IntraVENous Q4H PRN    senna-docusate (PERICOLACE) 8.6-50 mg per tablet 2 Tab  2 Tab Oral BID PRN    bisacodyl (DULCOLAX) suppository 10 mg  10 mg Rectal DAILY PRN    naloxone (NARCAN) injection 0.4 mg  0.4 mg IntraVENous PRN    0.9% sodium chloride infusion 250 mL  250 mL IntraVENous PRN           Deena Cerrato DO  Internal Medicine, Hospitalist  Pager: [phone]  [address] [place] [address] Group

## 2020-01-04 NOTE — PROGRESS NOTES
Patient received in bed awake. Continue to have bilateral soft wrist restraints and mittens in place. Patient alert to self; reoriented to place, time and situation. Denies pain; behavior indications negative. No distress noted. Frequently use items within reach. Bed locked in low position. Call bell within reach.     1120- Dr. Asim Sales [place] [facility] [place]. received to renew bilateral soft wrist and mittens restraint. (RBV)

## 2020-01-04 NOTE — PROGRESS NOTES
Problem: Non-Violent Restraints  Goal: *Removal from restraints as soon as assessed to be safe  Outcome: Progressing Towards Goal  Goal: *No harm/injury to patient while restraints in use  Outcome: Progressing Towards Goal  Goal: *Patient's dignity will be maintained  Outcome: Progressing Towards Goal  Goal: *Patient Specific Goal (EDIT GOAL, INSERT TEXT)  Outcome: Progressing Towards Goal  Goal: Non-violent Restaints:Standard Interventions  Outcome: Progressing Towards Goal  Goal: Non-violent Restraints:Patient Interventions  Outcome: Progressing Towards Goal  Goal: Patient/Family Education  Outcome: Progressing Towards Goal     Problem: Discharge Planning  Goal: *Discharge to safe environment  Outcome: Progressing Towards Goal     Problem: Falls - Risk of  Goal: *Absence of Falls  Description  Document Ulises Akins Fall Risk and appropriate interventions in the flowsheet. Outcome: Progressing Towards Goal  Note: Fall Risk Interventions:       Mentation Interventions: Bed/chair exit alarm, Door open when patient unattended, More frequent rounding    Medication Interventions: Patient to call before getting OOB, Bed/chair exit alarm    Elimination Interventions: Bed/chair exit alarm, Call light in reach, Patient to call for help with toileting needs    History of Falls Interventions: Bed/chair exit alarm, Door open when patient unattended, Room close to nurse's station         Problem: Patient Education: Go to Patient Education Activity  Goal: Patient/Family Education  Outcome: Progressing Towards Goal     Problem: Pressure Injury - Risk of  Goal: *Prevention of pressure injury  Description  Document Enrique Scale and appropriate interventions in the flowsheet.   Outcome: Progressing Towards Goal  Note: Pressure Injury Interventions:  Sensory Interventions: Pressure redistribution bed/mattress (bed type)    Moisture Interventions: Check for incontinence Q2 hours and as needed    Activity Interventions: Pressure redistribution bed/mattress(bed type)    Mobility Interventions: Pressure redistribution bed/mattress (bed type)    Nutrition Interventions: Document food/fluid/supplement intake    Friction and Shear Interventions: Transferring/repositioning devices                Problem: Patient Education: Go to Patient Education Activity  Goal: Patient/Family Education  Outcome: Progressing Towards Goal     Problem: Pain  Goal: *Control of Pain  Outcome: Progressing Towards Goal  Goal: *PALLIATIVE CARE:  Alleviation of Pain  Outcome: Progressing Towards Goal     Problem: Patient Education: Go to Patient Education Activity  Goal: Patient/Family Education  Outcome: Progressing Towards Goal     Problem: Anemia Care Plan (Adult and Pediatric)  Goal: *Labs within defined limits  Outcome: Progressing Towards Goal  Goal: *Tolerates increased activity  Outcome: Progressing Towards Goal     Problem: Patient Education: Go to Patient Education Activity  Goal: Patient/Family Education  Outcome: Progressing Towards Goal     Problem: Discharge Planning  Goal: *Discharge to safe environment  Outcome: Progressing Towards Goal     Problem: Nutrition Deficit  Goal: *Optimize nutritional status  Outcome: Progressing Towards Goal

## 2020-01-05 PROBLEM — F03.90 DEMENTIA (HCC): Status: ACTIVE | Noted: 2020-01-05

## 2020-01-05 LAB
ANION GAP SERPL CALC-SCNC: 11 MMOL/L (ref 3–18)
BUN SERPL-MCNC: 51 MG/DL (ref 7–18)
BUN/CREAT SERPL: 5 (ref 12–20)
CALCIUM SERPL-MCNC: 8.3 MG/DL (ref 8.5–10.1)
CHLORIDE SERPL-SCNC: 100 MMOL/L (ref 100–111)
CO2 SERPL-SCNC: 28 MMOL/L (ref 21–32)
CREAT SERPL-MCNC: 9.34 MG/DL (ref 0.6–1.3)
GLUCOSE SERPL-MCNC: 109 MG/DL (ref 74–99)
HCT VFR BLD AUTO: 23.3 % (ref 36–48)
HGB BLD-MCNC: 7.4 G/DL (ref 13–16)
POTASSIUM SERPL-SCNC: 4.1 MMOL/L (ref 3.5–5.5)
SODIUM SERPL-SCNC: 139 MMOL/L (ref 136–145)

## 2020-01-05 PROCEDURE — 80048 BASIC METABOLIC PNL TOTAL CA: CPT

## 2020-01-05 PROCEDURE — 65270000029 HC RM PRIVATE

## 2020-01-05 PROCEDURE — 85014 HEMATOCRIT: CPT

## 2020-01-05 PROCEDURE — 36415 COLL VENOUS BLD VENIPUNCTURE: CPT

## 2020-01-05 PROCEDURE — 74011250637 HC RX REV CODE- 250/637: Performed by: HOSPITALIST

## 2020-01-05 PROCEDURE — 85018 HEMOGLOBIN: CPT

## 2020-01-05 PROCEDURE — 97162 PT EVAL MOD COMPLEX 30 MIN: CPT | Performed by: PHYSICAL THERAPIST

## 2020-01-05 RX ADMIN — AMIODARONE HYDROCHLORIDE 200 MG: 200 TABLET ORAL at 10:50

## 2020-01-05 NOTE — PROGRESS NOTES
Renal Progress Note  Follow up of ESRD     Assessment/Plan:  1. ESRD. Dialyzed yesterday, next dialysis on Monday. 2. NSTEMI. EF with 20% but no overt decompensated systolic chf.   3. Low GI bleeding. 4. Severe acute blood loss anemia on anemia of ESRD. H/H has stabilized with transfusions. 5. Secondary hyperparathyroidism of ESRD. No need for phos binder at this time. 6. Altered ms/agitation  on baseline dementia. Patient is known to me, even with interpretor his mental status is poor at baseline. Agree hospice care should be considered. Will f/u on Monday, please call if any questions. Subjective:  Patient complaints off: States he is \"ok\". Is agitated on/off. In restrains. No cp/sob. Per nurses, no recurrence of brbpr.         Patient Active Problem List   Diagnosis Code    Acute blood loss anemia D62    ESRD (end stage renal disease) ([facility] N18.6    HTN (hypertension) I10    HLD (hyperlipidemia) E78.5    DM (diabetes mellitus) ([facility]) E11.9    ACS [facility]acute coronary syndrome) ([facility] I24.9    GI bleed K92.2       Current Facility-Administered Medications   Medication Dose Route Frequency Provider Last Rate Last Dose    0.9% sodium chloride infusion 250 mL  250 mL IntraVENous PRN Mabel Dahl MD        0.9% sodium chloride infusion  50 mL/hr IntraVENous DIALYSIS PRN Melanie Brown MD        amiodarone (CORDARONE) tablet 200 mg  200 mg Oral DAILY Reggie Booth MD   200 mg at 01/04/20 1037    nitroglycerin (NITROSTAT) tablet 0.4 mg  0.4 mg SubLINGual Q5MIN PRN Reggie Booht MD        acetaminophen (TYLENOL) tablet 650 mg  650 mg Oral Q4H PRN Reggie Booth MD        ondansetron [facility]) injection 4 mg  4 mg IntraVENous Q4H PRN Reggie Booth MD        senna-docusate (PERICOLACE) 8.6-50 mg per tablet 2 Tab  2 Tab Oral BID PRN Reggie Booth MD        bisacodyl (DULCOLAX) suppository 10 mg  10 mg Rectal DAILY PRN Reggie Booth MD        naloxone [facility]) injection 0.4 mg  0.4 mg IntraVENous PRN Gregory Souza MD        0.9% sodium chloride infusion 250 mL  250 mL IntraVENous PRN Gregory Souza MD                 Objective:  Vitals:    01/04/20 0758 01/04/20 1031 01/04/20 1551 01/04/20 1926   BP: 136/50 124/77 115/64    Pulse: 74 75 64    Resp: 20 18 20    Temp: 97.6 °F (36.4 °C) 98.1 °F (36.7 °C) 98 °F (36.7 °C) 97.6 °F (36.4 °C)   TempSrc:       SpO2: 100% 100% 94%    Weight:       Height:         . Intake/Output Summary (Last 24 hours) at 1/4/2020 2107  Last data filed at 1/4/2020 1843  Gross per 24 hour   Intake 600 ml   Output 1 ml   Net 599 ml       Admission weight:Weight: 65.8 kg (145 lb) (01/01/20 1903)    Last Weight Metrics:  Weight Loss Metrics 1/4/2020 12/30/2019 12/24/2019 11/27/2017 8/28/2017 2/18/2010   Today's Wt 116 lb 12.8 oz 134 lb 134 lb 167 lb 166 lb 0.1 oz 173 lb   BMI 20.05 kg/m2 23.74 kg/m2 23.74 kg/m2 29.58 kg/m2 29.41 kg/m2 29.68 kg/m2            Physical Exam:     General: No acute distress. Neck: no jvd. LUNGS:Clear to Auscultation, No reales, rhonchi or wheezes. CVS EXM: S1, S2, RRR. Abdomen: Soft, Non Tender, non distended. Lower Extremities: No Edema. Rt bka stump dressings intact. Access: rt arm avf. Labs:    CBC w/Diff Recent Labs     01/04/20  1855 01/04/20  0344 01/03/20  2200 01/03/20  1605  01/02/20  2145 01/02/20  1130   WBC  --   --   --   --   --  16.6* 10.7   RBC  --   --   --   --   --  1.95* 1.70*   HGB 8.0* 7.9* 7.5* 6.0*   < > 5.7* 5.0*   HCT  --   --   --  17.7*  --  17.7* 15.3*   PLT  --   --   --   --   --  323 262   GRANS  --   --   --   --   --  89*  --    LYMPH  --   --   --   --   --  6*  --    EOS  --   --   --   --   --  2  --     < > = values in this interval not displayed.         Chemistry Recent Labs     01/04/20  0344 01/03/20  0715   GLU 83 118*    139   K 4.0 5.2    102   CO2 28 24   BUN 36* 110*   CREA 6.47* 14.30*   CA 7.9* 7.8*   AGAP 11 13   BUCR 6* 8*          No results found for: IRON, FE, TIBC, IBCT, PSAT, FERR   Lab Results   Component Value Date/Time    Calcium 7.9 (L) 01/04/2020 03:44 AM    CALCIUM,IONIZED 4.20 (L) 08/28/2017 10:40 AM          Maddy Kim M.D  [facility]  Office [phone]  Pager [phone]

## 2020-01-05 NOTE — ROUTINE PROCESS
Bedside and Verbal shift change report given to Kendal El RN (oncoming nurse) by Shayla Mario RN, BSN (offgoing nurse). Report given with SBAR, Kardex, Intake/Output, MAR and Recent Results.

## 2020-01-05 NOTE — PROGRESS NOTES
Bedside and Verbal shift change report given to COSME Frey (oncoming nurse) by Madhu More (offgoing nurse). Report included the following information SBAR, Kardex, MAR and Recent Results. patient in restraints   1000 patient removed restraints but is calm. Asked for coffee    1100 patient nephew called to clarify that he doesn't want uncle to be on Hospice that he wants patient to receive full care. Paged physician to clarify hospice consult and or for few medications to be given per janessa.

## 2020-01-05 NOTE — PROGRESS NOTES
Internal Medicine Progress Note    Patient's Name: Pito Foley Date: 1/1/2020  Length of Stay: 4      Assessment/Plan     C/[address] Problems    Diagnosis Date Noted    Dementia [facility]) 01/05/2020    Acute blood loss anemia 01/01/2020    ESRD (end stage renal disease) ([facility] 01/01/2020    HTN (hypertension) 01/01/2020    HLD (hyperlipidemia) 01/01/2020    DM (diabetes mellitus) ([facility] 01/01/2020    ACS (acute coronary syndrome) ([facility] 01/01/2020    GI bleed 01/01/2020     - Hgb stable  - Cont to monitor H&H for now  - Cont diet  - Dialysis per nephro  - No plans for cath per cardio  - Medical mgmt for now for both GI bleed and ACS  - PT/OT rec SNF  - Cont acceptable home medications for chronic conditions   - DVT protocol    I have personally reviewed all pertinent labs and films that have officially resulted over the last 24 hours. I have personally checked for all pending labs that are awaiting final results.     Subjective     Pt s/e @ bedside  No major events overnight  Doing OK w/o issues  Confused at baseline  Denies CP or SOB    Objective     Visit Vitals  /71 (BP 1 Location: Left leg, BP Patient Position: At rest)   Pulse 74   Temp 97.8 °F (36.6 °C)   Resp 18   Ht 5' 4\" (1.626 m)   Wt 52.6 kg (115 lb 14.4 oz)   SpO2 94%   BMI 19.89 kg/m²       Physical Exam:  General Appearance: NAD, confused but pleasant  Lungs: CTA with normal respiratory effort  CV: RRR, no m/r/g  Abdomen: soft, non-tender, normal bowel sounds  Extremities: no cyanosis, no peripheral edema  Neuro: No focal deficits, motor/sensory intact    Lab/Data Reviewed:  BMP:   Lab Results   Component Value Date/Time     01/05/2020 04:46 AM    K 4.1 01/05/2020 04:46 AM     01/05/2020 04:46 AM    CO2 28 01/05/2020 04:46 AM    AGAP 11 01/05/2020 04:46 AM     (H) 01/05/2020 04:46 AM    BUN 51 (H) 01/05/2020 04:46 AM    CREA 9.34 (H) 01/05/2020 04:46 AM    GFRAA 7 (L) 01/05/2020 04:46 AM    GFRNA 6 (L) 01/05/2020 04:46 AM     CBC:   Lab Results   Component Value Date/Time    HGB 7.4 (L) 01/05/2020 04:46 AM    HCT 23.3 (L) 01/05/2020 04:46 AM       Imaging Reviewed:  No results found.     Medications Reviewed:  Current Facility-Administered Medications   Medication Dose Route Frequency    0.9% sodium chloride infusion 250 mL  250 mL IntraVENous PRN    0.9% sodium chloride infusion  50 mL/hr IntraVENous DIALYSIS PRN    amiodarone (CORDARONE) tablet 200 mg  200 mg Oral DAILY    nitroglycerin (NITROSTAT) tablet 0.4 mg  0.4 mg SubLINGual Q5MIN PRN    acetaminophen (TYLENOL) tablet 650 mg  650 mg Oral Q4H PRN    ondansetron (ZOFRAN) injection 4 mg  4 mg IntraVENous Q4H PRN    senna-docusate (PERICOLACE) 8.6-50 mg per tablet 2 Tab  2 Tab Oral BID PRN    bisacodyl (DULCOLAX) suppository 10 mg  10 mg Rectal DAILY PRN    naloxone (NARCAN) injection 0.4 mg  0.4 mg IntraVENous PRN    0.9% sodium chloride infusion 250 mL  250 mL IntraVENous PRN           Galdino Lundberg DO  Internal Medicine, Hospitalist  Pager: [phone]  [address] [facility]

## 2020-01-05 NOTE — PROGRESS NOTES
Problem: Mobility Impaired (Adult and Pediatric)  Goal: *Acute Goals and Plan of Care (Insert Text)  Description  Physical Therapy Goals  Initiated 1/5/2020 and to be accomplished within 7 day(s)  1. Patient will move from supine to sit and sit to supine , scoot up and down and roll side to side in bed with independence. 2.  Patient will transfer from bed to chair and chair to bed with minimal assistance/contact guard assist using the least restrictive device. 3.  Patient will perform sit to stand with minimal assistance/contact guard assist.  4.  Patient will ambulate with minimal assistance/contact guard assist for 10 feet with the least restrictive device. Outcome: Progressing Towards Goal     PHYSICAL THERAPY EVALUATION    Patient: [facility] (3 y.o. male)  Date: 1/5/2020  Primary Diagnosis: ACS (acute coronary syndrome) (HCC) [I24.9]  ACS (acute coronary syndrome) (HCC) [I24.9]  Procedure(s) (LRB):  CORONARY ANGIOGRAPHY (N/A) 4 Days Post-Op   Precautions:  Fall  PLOF: unknown at evaluation as patient is a poor historian. ASSESSMENT :  Upon evaluation, pt was found laying supine in bed. Pt shook his head yes and was agreeable to PT evaluation. Evaluation was done at supine level as patients hemoglobin was 7.4. Pt had  a R BKA as He was unable to answer PLOF/home environment level questions. When PT put head of bed down he was able to scoot up in bed with supervision assist after max VC's. He could roll B with supervision assist and VCs on technique. He was unable to follow commands to perform supine level therex even after physical/verbal cueing. He reported having pain in the R stump but was unable to verbalize a pain rating. Pt would benefit from a course of skilled PT to further address OOB activity tolerance and transfers when medical condition stabilizes and cognition clears to assess safety to reduce risk of falls.      Patient will benefit from skilled intervention to address the above impairments. Patient's rehabilitation potential is considered to be Fair  Factors which may influence rehabilitation potential include:   []         None noted  [x]         Mental ability/status  [x]         Medical condition  [x]         Home/family situation and support systems  [x]         Safety awareness  []         Pain tolerance/management  []         Other:      PLAN :  Recommendations and Planned Interventions:   [x]           Bed Mobility Training             [x]    Neuromuscular Re-Education  [x]           Transfer Training                   [x]    Orthotic/Prosthetic Training  [x]           Gait Training                          []    Modalities  [x]           Therapeutic Exercises           []    Edema Management/Control  [x]           Therapeutic Activities            [x]    Family Training/Education  [x]           Patient Education  []           Other (comment):    Frequency/Duration: Patient will be followed by physical therapy 3-5 times a week to address goals. Discharge Recommendations: Skilled Nursing Facility  Further Equipment Recommendations for Discharge: N/A     SUBJECTIVE:   Pt was non-verbal at evaluation    OBJECTIVE DATA SUMMARY:     Past Medical History:   Diagnosis Date    CAD (coronary artery disease)     Chronic pain     back    Diabetes ([facility]     ESRD (end stage renal disease) on dialysis ([facility]     alisia [place] in [facility].  Dr. Trotter Estimable    Hypercholesterolemia     Hypertension     Stroke [facility])      Past Surgical History:   Procedure Laterality Date    CARDIAC SURG PROCEDURE UNLIST      angioplasty    HX GI  3-2008    EGD Normal    HX VASCULAR ACCESS      AV fistula     Barriers to Learning/Limitations: yes;  altered mental status (i.e.Sedation, Confusion)  Compensate with: Verbal Cues and Tactile Cues  Home Situation:     Critical Behavior:  Neurologic State: Confused  Orientation Level: Oriented to person  Cognition: No command following       Range Of Motion:  AROM: Within functional limits   PROM: Within functional limits   Functional Mobility:  Bed Mobility:  Rolling: Supervision         Therapeutic Exercises:   Unable to follow commands  Pain:  Unable to verbalize pain level but pointed to R stump  Response to intervention: See doc flow    Activity Tolerance:   Fair due to medical condition  Please refer to the flowsheet for vital signs taken during this treatment. After treatment:   []         Patient left in no apparent distress sitting up in chair  [x]         Patient left in no apparent distress in bed  [x]         Call bell left within reach  [x]         Nursing notified  []         Caregiver present  []         Bed alarm activated  []         SCDs applied    COMMUNICATION/EDUCATION:   []         Role of Physical Therapy in the acute care setting. []         Fall prevention education was provided and the patient/caregiver indicated understanding. []         Patient/family have participated as able in goal setting and plan of care. []         Patient/family agree to work toward stated goals and plan of care. []         Patient understands intent and goals of therapy, but is neutral about his/her participation. [x]         Patient is unable to participate in goal setting/plan of care: ongoing with therapy staff.  []         Other:     Thank you for this referral.  Modesta Matta DPT   Time Calculation: 13 mins      Eval Complexity: History: HIGH Complexity :3+ comorbidities / personal factors will impact the outcome/ POC Exam:HIGH Complexity : 4+ Standardized tests and measures addressing body structure, function, activity limitation and / or participation in recreation  Presentation: HIGH Complexity : Unstable and unpredictable characteristics  Clinical Decision Making:Medium Complexity    Overall Complexity:MEDIUM

## 2020-01-05 NOTE — PROGRESS NOTES
Problem: Non-Violent Restraints  Goal: *Removal from restraints as soon as assessed to be safe  Outcome: Progressing Towards Goal  Goal: *No harm/injury to patient while restraints in use  Outcome: Progressing Towards Goal  Goal: *Patient's dignity will be maintained  Outcome: Progressing Towards Goal  Goal: *Patient Specific Goal (EDIT GOAL, INSERT TEXT)  Outcome: Progressing Towards Goal  Goal: Non-violent Restaints:Standard Interventions  Outcome: Progressing Towards Goal  Goal: Non-violent Restraints:Patient Interventions  Outcome: Progressing Towards Goal  Goal: Patient/Family Education  Outcome: Progressing Towards Goal     Problem: Discharge Planning  Goal: *Discharge to safe environment  Outcome: Progressing Towards Goal     Problem: Falls - Risk of  Goal: *Absence of Falls  Description  Document Josselin Latin Fall Risk and appropriate interventions in the flowsheet. Outcome: Progressing Towards Goal  Note: Fall Risk Interventions:  Mobility Interventions: Bed/chair exit alarm, Patient to call before getting OOB    Mentation Interventions: Bed/chair exit alarm, Door open when patient unattended, Adequate sleep, hydration, pain control    Medication Interventions: Patient to call before getting OOB, Bed/chair exit alarm    Elimination Interventions: Call light in reach    History of Falls Interventions: Bed/chair exit alarm, Door open when patient unattended         Problem: Patient Education: Go to Patient Education Activity  Goal: Patient/Family Education  Outcome: Progressing Towards Goal     Problem: Pressure Injury - Risk of  Goal: *Prevention of pressure injury  Description  Document Enrique Scale and appropriate interventions in the flowsheet.   Outcome: Progressing Towards Goal  Note: Pressure Injury Interventions:  Sensory Interventions: Assess need for specialty bed, Check visual cues for pain    Moisture Interventions: Check for incontinence Q2 hours and as needed, Apply protective barrier, creams and emollients    Activity Interventions: Increase time out of bed    Mobility Interventions: Float heels, HOB 30 degrees or less    Nutrition Interventions: Document food/fluid/supplement intake    Friction and Shear Interventions: Feet elevated on foot rest, HOB 30 degrees or less                Problem: Patient Education: Go to Patient Education Activity  Goal: Patient/Family Education  Outcome: Progressing Towards Goal     Problem: Pain  Goal: *Control of Pain  Outcome: Progressing Towards Goal  Goal: *PALLIATIVE CARE:  Alleviation of Pain  Outcome: Progressing Towards Goal     Problem: Patient Education: Go to Patient Education Activity  Goal: Patient/Family Education  Outcome: Progressing Towards Goal     Problem: Anemia Care Plan (Adult and Pediatric)  Goal: *Labs within defined limits  Outcome: Progressing Towards Goal  Goal: *Tolerates increased activity  Outcome: Progressing Towards Goal     Problem: Patient Education: Go to Patient Education Activity  Goal: Patient/Family Education  Outcome: Progressing Towards Goal     Problem: Discharge Planning  Goal: *Discharge to safe environment  Outcome: Progressing Towards Goal     Problem: Nutrition Deficit  Goal: *Optimize nutritional status  Outcome: Progressing Towards Goal     Problem: Patient Education: Go to Patient Education Activity  Goal: Patient/Family Education  Outcome: Progressing Towards Goal

## 2020-01-05 NOTE — ROUTINE PROCESS
Bedside shift change report given to by Elizabeth Mart (offgoing nurse). Report included the following information SBAR, Intake/Output and Recent Results. Lt hand soft restraint mitten reapplied. Noted pt alert to self only and inconsolable. 0700 1/5/20 Gave report to on coming nurse, Iram Arizmendi while pt layed in bed restraint free and calm requesting food. Mittens and arm restraints reapplied w/o struggle. Beside report included pt abnormal Hgb and plans for transfuse during dialysis d/t large bowel movements w/ copious blood output.

## 2020-01-06 PROCEDURE — 74011250637 HC RX REV CODE- 250/637: Performed by: HOSPITALIST

## 2020-01-06 PROCEDURE — 65270000029 HC RM PRIVATE

## 2020-01-06 PROCEDURE — 90935 HEMODIALYSIS ONE EVALUATION: CPT

## 2020-01-06 RX ORDER — ATORVASTATIN CALCIUM 40 MG/1
80 TABLET, FILM COATED ORAL DAILY
Status: DISCONTINUED | OUTPATIENT
Start: 2020-01-07 | End: 2020-01-08 | Stop reason: HOSPADM

## 2020-01-06 RX ORDER — METOPROLOL SUCCINATE 25 MG/1
25 TABLET, EXTENDED RELEASE ORAL 2 TIMES DAILY
Status: DISCONTINUED | OUTPATIENT
Start: 2020-01-06 | End: 2020-01-08 | Stop reason: HOSPADM

## 2020-01-06 RX ADMIN — METOPROLOL SUCCINATE 25 MG: 25 TABLET, EXTENDED RELEASE ORAL at 17:27

## 2020-01-06 RX ADMIN — AMIODARONE HYDROCHLORIDE 200 MG: 200 TABLET ORAL at 09:49

## 2020-01-06 NOTE — PROGRESS NOTES
Attempted PT session x2. Off floor on both attempts ([phone]; [phone]). Will follow up as able.

## 2020-01-06 NOTE — PROGRESS NOTES
Internal Medicine Progress Note    Patient's Name: Pito Foley Date: 1/1/2020  Length of Stay: 5      Assessment/Plan     C/[address] Problems    Diagnosis Date Noted    Dementia [facility]) 01/05/2020    Acute blood loss anemia 01/01/2020    ESRD (end stage renal disease) ([facility] 01/01/2020    HTN (hypertension) 01/01/2020    HLD (hyperlipidemia) 01/01/2020    DM (diabetes mellitus) ([facility] 01/01/2020    ACS (acute coronary syndrome) ([facility] 01/01/2020    GI bleed 01/01/2020     - Hgb relatively stable b/w 7-9  - Monitor w/ dialysis  - Cont diet  - Dialysis per nephro  - No plans for cath per cardio  - OK to restart BB for further rate control, otherwise BP in good control  - OK to start statin  - Medical mgmt for now for both GI bleed and ACS  - PT/OT rec SNF  - Dispo planning per case mgmt  - Cont acceptable home medications for chronic conditions   - DVT protocol    I have personally reviewed all pertinent labs and films that have officially resulted over the last 24 hours. I have personally checked for all pending labs that are awaiting final results.     Subjective     Pt s/e @ bedside in dialysis  No major events overnight  Remains at baseline  No complaints    Objective     Visit Vitals  /71 (BP 1 Location: Right arm, BP Patient Position: At rest)   Pulse 79   Temp 98 °F (36.7 °C)   Resp 15   Ht 5' 4\" (1.626 m)   Wt 52.6 kg (115 lb 14.4 oz)   SpO2 96%   BMI 19.89 kg/m²       Physical Exam:  General Appearance: NAD, confused but pleasant  Lungs: CTA with normal respiratory effort  CV: RRR, no m/r/g  Abdomen: soft, non-tender, normal bowel sounds  Extremities: no cyanosis, no peripheral edema  Neuro: No focal deficits, motor/sensory intact    Lab/Data Reviewed:  BMP:   No results found for: NA, K, CL, CO2, AGAP, GLU, BUN, CREA, GFRAA, GFRNA  CBC:   No results found for: WBC, HGB, HGBEXT, HCT, HCTEXT, PLT, PLTEXT, HGBEXT, HCTEXT, PLTEXT    Imaging Reviewed:  No results found.    Medications Reviewed:  Current Facility-Administered Medications   Medication Dose Route Frequency    [START ON 1/8/2020] epoetin leslie-epbx (RETACRIT) injection 10,000 Units  10,000 Units IntraVENous DIALYSIS MON, WED & FRI    0.9% sodium chloride infusion 250 mL  250 mL IntraVENous PRN    0.9% sodium chloride infusion  50 mL/hr IntraVENous DIALYSIS PRN    amiodarone (CORDARONE) tablet 200 mg  200 mg Oral DAILY    nitroglycerin (NITROSTAT) tablet 0.4 mg  0.4 mg SubLINGual Q5MIN PRN    acetaminophen (TYLENOL) tablet 650 mg  650 mg Oral Q4H PRN    ondansetron (ZOFRAN) injection 4 mg  4 mg IntraVENous Q4H PRN    senna-docusate (PERICOLACE) 8.6-50 mg per tablet 2 Tab  2 Tab Oral BID PRN    bisacodyl (DULCOLAX) suppository 10 mg  10 mg Rectal DAILY PRN    naloxone (NARCAN) injection 0.4 mg  0.4 mg IntraVENous PRN    0.9% sodium chloride infusion 250 mL  250 mL IntraVENous PRN           Michelle Dean,   Internal Medicine, Hospitalist  Pager: [phone]  [address] [place] [address]

## 2020-01-06 NOTE — PROGRESS NOTES
Pt son does not want hospice. Per hospice nurse. Have not been able to get a response from [facility], where pt came from. Spoke with  at [facility], he states he was under impression family did not want him to return there. Told him no documentation from cm's here that is the case, will check with family, in meantime he needs to review and let me know if can accept back when ready. He stated he would.

## 2020-01-06 NOTE — PROGRESS NOTES
1900  -- Bedside, Verbal and Written shift change report given to [id number] David Baum (oncoming nurse) by SHAHBAZ Curry nurse). Report included the following information SBAR, Kardex, Intake/Output, MAR and Recent Results. Pt is confused and becomes increasingly combative when attempting to collect vitals or perform assessment. COSME Silverio attempted to assist with me pt (concern for language barrier), but pt continues to refuse at attempts for vitals and assessments. Discussed with RN on clarity of POA and Hospice concerns, will continue to follow up with Case Management.      0000 -- Shift reassessment, pt condition unchanged, will continue to monitor. Pt is confused and becomes increasingly combative when attempting to collect vitals or perform assessment. COSME Silverio attempted to assist with me pt (concern for language barrier), but pt continues to refuse at attempts for vitals and assessments. 0700  -- Bedside, Verbal and Written shift change report given to NATY  (oncoming nurse) by ADRIANNE (offgoing nurse). Report included the following information SBAR, Kardex, Intake/Output, MAR and Recent Results. Skin assessment completed.

## 2020-01-06 NOTE — PROGRESS NOTES
Problem: Non-Violent Restraints  Goal: *Removal from restraints as soon as assessed to be safe  Outcome: Resolved/Met  Goal: *No harm/injury to patient while restraints in use  Outcome: Resolved/Met  Goal: *Patient's dignity will be maintained  Outcome: Resolved/Met  Goal: *Patient Specific Goal (EDIT GOAL, INSERT TEXT)  Outcome: Resolved/Met  Goal: Non-violent Restaints:Standard Interventions  Outcome: Resolved/Met  Goal: Non-violent Restraints:Patient Interventions  Outcome: Resolved/Met  Goal: Patient/Family Education  Outcome: Resolved/Met

## 2020-01-06 NOTE — PROGRESS NOTES
Problem: Discharge Planning  Goal: *Discharge to safe environment  Outcome: Progressing Towards Goal     Problem: Falls - Risk of  Goal: *Absence of Falls  Description  Document Romulo Preciado Fall Risk and appropriate interventions in the flowsheet. Outcome: Progressing Towards Goal  Note: Fall Risk Interventions:  Mobility Interventions: Bed/chair exit alarm, Patient to call before getting OOB    Mentation Interventions: Adequate sleep, hydration, pain control    Medication Interventions: Bed/chair exit alarm, Patient to call before getting OOB, Teach patient to arise slowly    Elimination Interventions: Call light in reach, Bed/chair exit alarm    History of Falls Interventions: Bed/chair exit alarm, Door open when patient unattended         Problem: Patient Education: Go to Patient Education Activity  Goal: Patient/Family Education  Outcome: Progressing Towards Goal     Problem: Pressure Injury - Risk of  Goal: *Prevention of pressure injury  Description  Document Enrique Scale and appropriate interventions in the flowsheet.   Outcome: Progressing Towards Goal  Note: Pressure Injury Interventions:  Sensory Interventions: Assess need for specialty bed, Avoid rigorous massage over bony prominences    Moisture Interventions: Check for incontinence Q2 hours and as needed, Apply protective barrier, creams and emollients    Activity Interventions: Increase time out of bed    Mobility Interventions: HOB 30 degrees or less    Nutrition Interventions: Document food/fluid/supplement intake    Friction and Shear Interventions: HOB 30 degrees or less, Foam dressings/transparent film/skin sealants                Problem: Patient Education: Go to Patient Education Activity  Goal: Patient/Family Education  Outcome: Progressing Towards Goal     Problem: Pain  Goal: *Control of Pain  Outcome: Progressing Towards Goal  Goal: *PALLIATIVE CARE:  Alleviation of Pain  Outcome: Progressing Towards Goal     Problem: Patient Education: Go to Patient Education Activity  Goal: Patient/Family Education  Outcome: Progressing Towards Goal     Problem: Anemia Care Plan (Adult and Pediatric)  Goal: *Labs within defined limits  Outcome: Progressing Towards Goal  Goal: *Tolerates increased activity  Outcome: Progressing Towards Goal     Problem: Patient Education: Go to Patient Education Activity  Goal: Patient/Family Education  Outcome: Progressing Towards Goal     Problem: Discharge Planning  Goal: *Discharge to safe environment  Outcome: Progressing Towards Goal     Problem: Nutrition Deficit  Goal: *Optimize nutritional status  Outcome: Progressing Towards Goal     Problem: Patient Education: Go to Patient Education Activity  Goal: Patient/Family Education  Outcome: Progressing Towards Goal

## 2020-01-06 NOTE — DIALYSIS
PAYAM        ACUTE HEMODIALYSIS FLOW SHEET      HEMODIALYSIS ORDERS: Physician: Lori Neito      Dialyzer: revaclear   Duration: 4 hr  BFR: 400   DFR: 800   Dialysate:  Temp 36-37*C  K+   2    Ca+  2.5 Na 140 Bicarb 35   Weight:  52.6 kg    Patient Chart [x]     Unable to Obtain []   Dry weight/UF Goal: 1500  Access Left AVF  Needle Gauge 15    Heparin []  Bolus      Units    [] Hourly       Units    [x]None      Catheter locking solution    Pre BP:   108/60    Pulse:     98       Respirations: 18  Temperature:   97.3   Labs: Pre        Post:        [x] N/A   Additional Orders(medications, blood products, hypotension management):       [x] N/A     [x] Payam Consent Verified     CATHETER ACCESS: [x]N/A   []Right   []Left   []IJ     []Fem   []chest wall   [] First use X-ray verified     []Tunnel                [] Non Tunneled   []No S/S infection  []Redness  []Drainage []Cultured []Swelling []Pain   []Medical Aseptic Prep Utilized   []Dressing Changed  [] Biopatch  Date:       []Clotted   []Patent   Flows: []Good  []Poor  []Reversed   If access problem,  notified: []Yes    [x]N/A  Date:           GRAFT/FISTULA ACCESS:  []N/A     []Right     [x]Left     [x]UE     []LE   []AVG   [x]AVF        []Buttonhole    [x]Medical Aseptic Prep Utilized   [x]No S/S infection  []Redness  []Drainage []Cultured []Swelling []Pain    Bruit:   [x] Strong    [] Weak       Thrill :   [x] Strong    [] Weak       Needle Gauge: 15   Length: 1   If access problem,  notified: []Yes     [x]N/A  Date:        Please describe access if present and not used:                            GENERAL ASSESSMENT:      LUNGS:  Rate 18 SaO2%        [x] N/A    [x] Clear  [] Coarse  [] Crackles  [] Wheezing        [] Diminished     Location : []RLL   []LLL    []RUL  []JANELL     Cough: []Productive  []Dry  [x]N/A   Respirations:  [x]Easy  []Labored     Therapy:   [x]RA  []NC  l/min    Mask: []NRB []Venti       O2%                  []Ventilator  []Intubated  [] Trach  [] BiPaP     CARDIAC: [x]Regular      [] Irregular   [] Pericardial Rub  [] JVD        []  Monitored  [] Bedside  [] Remotely monitored [] N/A  Rhythm:      EDEMA: [x] None  []Generalized  [] Pitting [] 1    [] 2    [] 3    [] 4                 [] Facial  [] Pedal  []  UE  [] LE     SKIN:   [x] Warm  [] Hot     [] Cold   [x] Dry     [] Pale   [] Diaphoretic                  [] Flushed  [] Jaundiced  [] Cyanotic  [] Rash  [] Weeping     LOC:    [x] Alert      [x]Oriented:    [x] Person     [x] Place  []Time               [] Confused  [] Lethargic  [] Medicated  [] Non-responsive     GI / ABDOMEN:  [] Flat    [] Distended    [x] Soft    [] Firm   []  Obese                             [] Diarrhea  [x] Bowel Sounds  [] Nausea  [] Vomiting       / URINE ASSESSMENT:[] Voiding   [x] Oliguria  [] Anuria   []  Lopez     [] Incontinent    []  Incontinent Brief      []  Bathroom Privileges       PAIN: [x] 0 []1  []2   []3   []4   []5   []6   []7   []8   []9   []10              Scale 0-10  Action/Follow Up:      MOBILITY:  [] Amb    [] Amb/Assist    [x] Bed    [] Wheelchair  [] Stretcher      All Vitals and Treatment Details on Attached [address]: [facility]          Room # 0237/17      [] 1st Time Acute  [] Stat  [x] Routine  [] Urgent     [x] Acute Room  []  Bedside  [] ICU/CCU  [] ER   Isolation Precautions:   There are currently no Active Isolations      Special Considerations:         [] Blood Consent Verified [x]N/A     ALLERGIES: No Known Allergies            Code Status:Full Code        Hepatitis Status:                Lab Results   Component Value Date/Time    Hepatitis B surface Ag <0.10 11/26/2019 07:59 AM    Hepatitis B surface Ab 313.68 11/26/2019 07:59 AM                     Current Labs:   Lab Results   Component Value Date/Time    Sodium 139 01/05/2020 04:46 AM    Potassium 4.1 01/05/2020 04:46 AM    Chloride 100 01/05/2020 04:46 AM    CO2 28 01/05/2020 04:46 AM    Anion gap 11 01/05/2020 04:46 AM    Glucose 109 (H) 01/05/2020 04:46 AM    BUN 51 (H) 01/05/2020 04:46 AM    Creatinine 9.34 (H) 01/05/2020 04:46 AM    BUN/Creatinine ratio 5 (L) 01/05/2020 04:46 AM    GFR est AA 7 (L) 01/05/2020 04:46 AM    GFR est non-AA 6 (L) 01/05/2020 04:46 AM    Calcium 8.3 (L) 01/05/2020 04:46 AM      Lab Results   Component Value Date/Time    WBC 16.6 (H) 01/02/2020 09:45 PM    Hemoglobin, POC 10.2 (L) 12/24/2019 11:08 AM    HGB 7.4 (L) 01/05/2020 04:46 AM    Hematocrit, POC 30 (L) 12/24/2019 11:08 AM    HCT 23.3 (L) 01/05/2020 04:46 AM    PLATELET 044 42/75/2152 09:45 PM    MCV 90.8 01/02/2020 09:45 PM                                                                                     DIET: DIET DENTAL SOFT (SOFT SOLID)  DIET NUTRITIONAL SUPPLEMENTS [facility])       PRIMARY NURSE REPORT: First initial/Last name/Title      Pre Dialysis: Enrique Tomlinson RN      Time: 3034      EDUCATION:    [x] Patient [] Other         Knowledge Basis: []None [x]Minimal [] Substantial   Barriers to learning  [x]N/A   [x] Access Care     [] S&S of infection     [] Fluid Management     []K+     [x]Procedural    []Albumin     [] Medications     [] Tx Options     [] Transplant     [] Diet     [] Other   Teaching Tools:  [x] Explain  [] Demo  [] Handouts [] Video  Patient response:   [] Verbalized understanding  [] Teach back  [] Return demonstration [x] Requires follow up   Inappropriate due to            [x] Time Out/Safety Check  [x]Extracorporeal Circuit Tested for integrity       RO/HEMODIALYSIS MACHINE SAFETY CHECKS  Before each treatment:     Machine Number:                                                             [facility]                                  [x] Portable Machine #11/RO serial # [id number]       Alarm Test:  Pass time 4373               [x] RO/Machine Log Complete      Temp    36.6             Dialysate: pH  7.4  Conductivity: Meter   13.9     HD Machine   14.2                  TCD: 13.9  Dialyzer Lot # [id number] Blood Tubing Lot # [id number]          Saline Lot #  -[id number]     CHLORINE TESTING-Before each treatment and every 4 hours    Total Chlorine: [x] less than 0.1 ppm  Time: 0930  4 Hr/2nd Check Time: 1330   (if greater than 0.1 ppm from Primary then every 30 minutes from Secondary)     TREATMENT INITIATION  with Dialysis Precautions:   [x] All Connections Secured                 [x] Saline Line Double Clamped   [x] Venous Parameters Set                  [x] Arterial Parameters Set    [x] Prime Given 250ml                          [x]Air Foam Detector Engaged      Treatment Initiation Note: pt arrived to unit alert and in stable condition. Left AVF cannulated without difficulty. Treatment initiated without complication. During Treatment Notes: 1100: pt resting with eyes closed, easily aroused with verbal stimuli, no s/s of distress present,   1130: Dr. Michael Bush at bedside to see pt.       1200: pt resting with eyes closed, easily aroused with verbal stimuli, no s/s of distress present, face and access in view. 1300: Arterial chamber clotting off, blood returned, machine restrung, and treatment resumed. Pt resting with eyes closed, easily aroused with verbal stimuli, no s/s of distress present. 1400: pt resting with eyes closed, easily aroused with verbal sitmuli, no s/s of distress present, face and access in view. Medication Dose Volume Route Time [facility] name Title         RN         RN         RN           RN                   Post Assessment:   Dialyzer Cleared: [x] Good [] Fair  [] Poor  Blood processed:  72 L  UF Removed  2000 Ml  POst BP:   123/70       Pulse: 92        Respirations: 18  Temperature: 97.4 Lungs:     [x] Clear      [] Course         [] Crackles    [] Wheezing         [] Diminished   Post Tx Vascular Access:   AVF/AVG: Bleeding stopped   Art 5 min.  Karlo. 5 Min    Cardiac:   [x] Regular   [] Irregular   [] Monitor  [] N/A      Rhythm:       Catheter:   Locking solution: Heparin 1:1000   N/A    Skin:   Pain:    [x] Warm  [x] Dry [] Diaphoretic    [] Flushed    [] Pale [] Cyanotic [x]0  []1  []2   []3  []4   []5   []6   []7   []8   []9   []10     Post Treatment Note: Treatment completed without further complication. 1.5 Liters removed during HD treatment.          POST TREATMENT PRIMARY NURSE HANDOFF REPORT:     First initial/Last name/Title         Post Dialysis: Ami Morrison RN  Time:  4292     Abbreviations: AVG-arterial venous graft, AVF-arterial venous fistula, IJ-Internal Jugular, Subcl-Subclavian, Fem-Femoral, Tx-treatment, AP/HR-apical heart rate, DFR-dialysate flow rate, BFR-blood flow rate, AP-arterial pressure, -venous pressure, UF-ultrafiltrate, TMP-transmembrane pressure, Karlo-Venous, Art-Arterial, RO-Reverse Osmosis

## 2020-01-06 NOTE — PROGRESS NOTES
Renal Progress Note  Follow up of ESRD     Assessment/Plan:  1. ESRD. Dialysis today and continue mwf. 2 liters uf today . 2. NSTEMI. EF with 20% but no overt decompensated systolic chf.   3. Low GI bleeding. 4. Severe acute blood loss anemia on anemia of ESRD. H/H has stabilized with transfusions. Add procrit. 5. Secondary hyperparathyroidism of ESRD. No need for phos binder at this time. 6. Altered ms/agitation  on baseline dementia. Patient is known to me, even with interpretor his mental status is poor at baseline. Agree hospice care should be considered. Subjective:  Patient complaints off:   Seen on dialysis. States he is \"ok\". Is less agitated but still in restrains. No cp/sob.      Patient Active Problem List   Diagnosis Code    Acute blood loss anemia D62    ESRD (end stage renal disease) ([facility] [address] N18.6    HTN (hypertension) I10    HLD (hyperlipidemia) E78.5    DM (diabetes mellitus) (HCC) E11.9    ACS (acute coronary syndrome) ([facility]) I24.9    GI bleed K92.2    Dementia ([facility]) F03.90       Current Facility-Administered Medications   Medication Dose Route Frequency Provider Last Rate Last Dose    0.9% sodium chloride infusion 250 mL  250 mL IntraVENous PRN Sagrario Plaza MD        0.9% sodium chloride infusion  50 mL/hr IntraVENous DIALYSIS PRN Jamila Brown MD        amiodarone (CORDARONE) tablet 200 mg  200 mg Oral DAILY Mc Lawrence MD   200 mg at 01/06/20 2858    nitroglycerin (NITROSTAT) tablet 0.4 mg  0.4 mg SubLINGual Q5MIN PRN Mc Lawrence MD        acetaminophen (TYLENOL) tablet 650 mg  650 mg Oral Q4H PRN Mc Lawrence MD        ondansetron [facility]) injection 4 mg  4 mg IntraVENous Q4H PRN Mc Lawrence MD        senna-docusate (PERICOLACE) 8.6-50 mg per tablet 2 Tab  2 Tab Oral BID PRN Mc Lawrence MD        bisacodyl (DULCOLAX) suppository 10 mg  10 mg Rectal DAILY PRN Mc Lawrence MD        naloxone [facility]) injection 0.4 mg  0.4 mg IntraVENous PRN Hessie Prader, MD        0.9% sodium chloride infusion 250 mL  250 mL IntraVENous PRN Hessie Prader, MD                 Objective:  Vitals:    01/05/20 4303 01/05/20 0446 01/05/20 0952 01/06/20 0726   BP: 120/68  128/71 133/71   Pulse: 70  74 79   Resp: 17  18 15   Temp: 97.6 °F (36.4 °C)  97.8 °F (36.6 °C) 98 °F (36.7 °C)   TempSrc:       SpO2: 94%   96%   Weight:  52.6 kg (115 lb 14.4 oz)     Height:         . Intake/Output Summary (Last 24 hours) at 1/6/2020 1119  Last data filed at 1/6/2020 0852  Gross per 24 hour   Intake 480 ml   Output 800 ml   Net -320 ml       Admission weight:Weight: 65.8 kg (145 lb) (01/01/20 1903)    Last Weight Metrics:  Weight Loss Metrics 1/5/2020 12/30/2019 12/24/2019 11/27/2017 8/28/2017 2/18/2010   Today's Wt 115 lb 14.4 oz 134 lb 134 lb 167 lb 166 lb 0.1 oz 173 lb   BMI 19.89 kg/m2 23.74 kg/m2 23.74 kg/m2 29.58 kg/m2 29.41 kg/m2 29.68 kg/m2            Physical Exam:     General: No acute distress. Neck: no jvd. LUNGS:Clear to Auscultation, No reales, rhonchi or wheezes. CVS EXM: S1, S2, RRR. Abdomen: Soft, Non Tender, non distended. Lower Extremities: No Edema. Rt bka stump dressings intact. Access: rt arm avf. Labs:    CBC w/Diff Recent Labs     01/05/20  0446 01/04/20  1855 01/04/20  0344  01/03/20  1605   HGB 7.4* 8.0* 7.9*   < > 6.0*   HCT 23.3*  --   --   --  17.7*    < > = values in this interval not displayed.         Chemistry Recent Labs     01/05/20  0446 01/04/20  0344   * 83    139   K 4.1 4.0    100   CO2 28 28   BUN 51* 36*   CREA 9.34* 6.47*   CA 8.3* 7.9*   AGAP 11 11   BUCR 5* 6*          No results found for: IRON, FE, TIBC, IBCT, PSAT, FERR   Lab Results   Component Value Date/Time    Calcium 8.3 (L) 01/05/2020 04:46 AM    CALCIUM,IONIZED 4.20 (L) 08/28/2017 10:40 AM          Nayan Blanc M.D  [facility]  Office [phone]  Pager [phone]

## 2020-01-07 PROCEDURE — 65270000029 HC RM PRIVATE

## 2020-01-07 PROCEDURE — 74011250637 HC RX REV CODE- 250/637: Performed by: HOSPITALIST

## 2020-01-07 PROCEDURE — 97530 THERAPEUTIC ACTIVITIES: CPT

## 2020-01-07 RX ADMIN — METOPROLOL SUCCINATE 25 MG: 25 TABLET, EXTENDED RELEASE ORAL at 08:38

## 2020-01-07 RX ADMIN — ATORVASTATIN CALCIUM 80 MG: 40 TABLET, FILM COATED ORAL at 08:38

## 2020-01-07 RX ADMIN — METOPROLOL SUCCINATE 25 MG: 25 TABLET, EXTENDED RELEASE ORAL at 17:55

## 2020-01-07 RX ADMIN — AMIODARONE HYDROCHLORIDE 200 MG: 200 TABLET ORAL at 08:38

## 2020-01-07 NOTE — PROGRESS NOTES
NUTRITION FOLLOW UP/PLAN OF CARE      RECOMMENDATIONS:   1. Continue current diet, supplements  2. Monitor labs, weight and PO intake  3. Feed pt at all meals and document PO intake    GOALS:   1. PO intake meets >75% of protein/calorie needs by 1/10/20  2. Ongoing/not met: Weight Maintenance (+/- 1-2 lb) by 1/14/20       ASSESSMENT:   Wt status is classified as normal per adjusted BMI of 19.5 for amputation. Diagnosis: ACS, anemia, ESRD on HD, HTN, HLD, DM, GI bleed. Plans to return to [facility]. Nutrition recommendations listed. RD to follow. Nutrition Diagnoses:   Improvement shown: Inadequate intake related to AMS as evidenced by 0% intake at lunch per tray visual    SUBJECTIVE/OBJECTIVE:   (1/7/20) Pt more alert today but still with confusion during my visit. Pt was answering MD questions prior to entering room. Pt does report good hunger cues but was unable to recall what he had for breakfast. Per reports consuming 100% of most meals today and yesterday. No reports of n/v/d/c at this time. Per I/Os BM 1/6. CBW: ~113 lb 1/7 showing ~3 lb loss. Spoke with staff about encouraging supplement intake as well. Family does not want hospice per documents. Continue to Feed pt at all meals and document PO intake. Will continue to monitor intake, weight, labs, POC.     (1/4/20) Pt seen for an initial assessment/ESRD on HD/low BMI. Pt admit with AMS. Pt resting in bed during time of assessment not responding appropriately to questions. Pt is a resident of [facility]. Pt with lunch tray in room untouched. Rec: feed pt at all meals until AMS improves. Adjusted BMI for amputation: 19.5. BKA 12/11/19. Hospice consult placed. Feed pt at all meals and document PO intake. Will continue to monitor intake, weight, labs, POC.      Information Obtained from:    [x] Chart Review   [x] Patient   [] Family/Caregiver   [x] Nurse/Physician   [] Interdisciplinary Meeting/Rounds    Diet: dental soft solid, ensure enlive TID  Medications: [x] Reviewed  Noted: cordarone, lipitor, toprol-xl, retacrit  Encounter Diagnoses     ICD-10-CM ICD-9-CM   1. Gastrointestinal hemorrhage, unspecified gastrointestinal hemorrhage type K92.2 578.9   2. STEMI (ST elevation myocardial infarction) ([facility] I21.3 410.90   3. Anemia, unspecified type D64.9 285.9   4. Elevated troponin R79.89 790.6     Past Medical History:   Diagnosis Date    CAD (coronary artery disease)     Chronic pain     back    Diabetes ([facility] [address]     ESRD (end stage renal disease) on dialysis ([facility]     mwf. [facility]  Dr. Anish Hair    Hypercholesterolemia     Hypertension     Stroke [facility])       Labs:    Lab Results   Component Value Date/Time    Sodium 139 01/05/2020 04:46 AM    Potassium 4.1 01/05/2020 04:46 AM    Chloride 100 01/05/2020 04:46 AM    CO2 28 01/05/2020 04:46 AM    Anion gap 11 01/05/2020 04:46 AM    Glucose 109 (H) 01/05/2020 04:46 AM    BUN 51 (H) 01/05/2020 04:46 AM    Creatinine 9.34 (H) 01/05/2020 04:46 AM    Calcium 8.3 (L) 01/05/2020 04:46 AM    Albumin 2.5 (L) 01/01/2020 07:35 PM     No results found for: GLU, GLUPOC, GLUCPOC  Lab Results   Component Value Date/Time    Cholesterol, total 327 (H) 02/05/2013 02:43 PM    HDL Cholesterol 39 (L) 02/05/2013 02:43 PM    LDL, calculated 231.8 02/05/2013 02:43 PM    VLDL, calculated 56.2 02/05/2013 02:43 PM    Triglyceride 281 (H) 02/05/2013 02:43 PM    CHOL/HDL Ratio 8.4 (H) 02/05/2013 02:43 PM     Anthropometrics: BMI (calculated): 19.5  Last 3 Recorded Weights in this Encounter    01/04/20 0425 01/05/20 0446 01/07/20 1035   Weight: 53 kg (116 lb 12.8 oz) 52.6 kg (115 lb 14.4 oz) 51.6 kg (113 lb 11.1 oz)      Ht Readings from Last 1 Encounters:   01/02/20 5' 4\" (1.626 m)     Documented Weight History:  Weight Metrics 1/7/2020 12/30/2019 12/24/2019 11/27/2017 8/28/2017 2/18/2010   Weight 113 lb 11.1 oz 134 lb 134 lb 167 lb 166 lb 0.1 oz 173 lb   BMI 19.52 kg/m2 23.74 kg/m2 23.74 kg/m2 29.58 kg/m2 29.41 kg/m2 29.68 kg/m2     Patient Vitals for the past 100 hrs:   % Diet Eaten   01/07/20 1242 100 %   01/07/20 0849 90 %   01/06/20 0852 90 %   01/05/20 1500 100 %   01/05/20 0953 25 %   01/04/20 1736 25 %   01/04/20 1554 80 %   01/04/20 1016 25 %       IBW: 122 lb (based on amputation)  [x] Weight Loss [] Weight Gain [] Weight Stable    Estimated Nutrition Needs: [] MSJ  [x] Other:  Calories: 6550-6346 kcal (30-35 kcal/kg) Based on:   [x] Actual BW    Protein:   66-72 g (1.2-1.3 g/kg) Based on:   [x] Actual BW    Fluid:       Urine output + 1000 mL     [x] No Cultural, Restoration or ethnic dietary need identified. [] Cultural, Restoration and ethnic food preferences identified and addressed     Wt Status:  [x] Normal (18.6 - 24.9) [] Underweight (<18.5) [] Overweight (25 - 29.9) [] Mild Obesity (30 - 34.9)  [] Moderate Obesity (35 - 39.9) [] Morbid Obesity (40+)     Nutrition Problems Identified:   [] Suboptimal PO intake   [] Food Allergies  [] Difficulty chewing/swallowing/poor dentition  [] Constipation/Diarrhea   [] Nausea/Vomiting   [] None  [x] Other:  AMS needing encouragement/assistance at meals    Plan:   [] Therapeutic Diet  []  Obtained/adjusted food preferences/tolerances and/or snacks options   [x]  Supplements added   [] Occupational therapy following for feeding techniques  []  HS snack added   [x]  Modify diet texture   []  Modify diet for food allergies   []  Educate patient   []  Assist with menu selection   [x]  Monitor PO intake on meal rounds   [x]  Continue inpatient monitoring and intervention   [x]  Participated in discharge planning/Interdisciplinary rounds/Team meetings   []  Other:     Education Needs:   [x] Not appropriate for teaching at this time due to:  AMS   [] Identified and addressed    Nutrition Monitoring and Evaluation:  [x] Continue ongoing monitoring and intervention  [] Ryanne Nunez

## 2020-01-07 NOTE — PROGRESS NOTES
Renal Progress Note  Follow up of ESRD     Assessment/Plan:  1. ESRD. Dialysis tomorrow and continue mwf. 2. NSTEMI. EF with 20% but no overt decompensated systolic chf.   3. Low GI bleeding. 4. Severe acute blood loss anemia on anemia of ESRD. Monitor h/h, continue procrit. 5. Secondary hyperparathyroidism of ESRD. No need for phos binder at this time. 6. Altered ms/agitation  on baseline dementia. Patient is known to me, even with interpretor his mental status is poor at baseline. Agree hospice care should be considered. Subjective:  Patient complaints off:   States he is \"ok\". Not agitated this am.   No cp/sob. States he is eating.      Patient Active Problem List   Diagnosis Code    Acute blood loss anemia D62    ESRD (end stage renal disease) ([facility] [address] N18.6    HTN (hypertension) I10    HLD (hyperlipidemia) E78.5    DM (diabetes mellitus) (HCC) E11.9    ACS (acute coronary syndrome) (HCC) I24.9    GI bleed K92.2    Dementia ([facility] F03.90       Current Facility-Administered Medications   Medication Dose Route Frequency Provider Last Rate Last Dose    [START ON 1/8/2020] epoetin leslie-epbx (RETACRIT) injection 10,000 Units  10,000 Units IntraVENous DIALYSIS MON, WED & FRI Emerald Brown MD        atorvastatin (LIPITOR) tablet 80 mg  80 mg Oral DAILY Valentino Giraldo H, DO   80 mg at 01/07/20 0987    metoprolol succinate (TOPROL-XL) XL tablet 25 mg  25 mg Oral BID Valentino Giraldo H, DO   25 mg at 01/07/20 5419    0.9% sodium chloride infusion 250 mL  250 mL IntraVENous PRN Maria Dolores Mccall MD        0.9% sodium chloride infusion  50 mL/hr IntraVENous DIALYSIS PRN Samantha Newton MD        amiodarone (CORDARONE) tablet 200 mg  200 mg Oral DAILY Josiah Sauer MD   200 mg at 01/07/20 8695    nitroglycerin (NITROSTAT) tablet 0.4 mg  0.4 mg SubLINGual Q5MIN PRN Josiah Sauer MD        acetaminophen (TYLENOL) tablet 650 mg  650 mg Oral Q4H PRN Josiah Sauer MD       [employer] ondansetron (ZOFRAN) injection 4 mg  4 mg IntraVENous Q4H PRN Reggie Booth MD        senna-docusate (PERICOLACE) 8.6-50 mg per tablet 2 Tab  2 Tab Oral BID PRN Reggie Booth MD        bisacodyl (DULCOLAX) suppository 10 mg  10 mg Rectal DAILY PRN Reggie Booth MD        naloxone [facility]) injection 0.4 mg  0.4 mg IntraVENous PRN Reggie Booth MD        0.9% sodium chloride infusion 250 mL  250 mL IntraVENous PRN Reggie Booth MD                 Objective:  Vitals:    01/06/20 1515 01/07/20 0733 01/07/20 1035 01/07/20 1058   BP: 123/70 117/73  112/77   Pulse: 97 86  82   Resp:  15  14   Temp: 97.4 °F (36.3 °C) 97.8 °F (36.6 °C)  97.6 °F (36.4 °C)   TempSrc: Oral      SpO2:  96%  97%   Weight:   51.6 kg (113 lb 11.1 oz)    Height:         . Intake/Output Summary (Last 24 hours) at 1/7/2020 1145  Last data filed at 1/7/2020 0849  Gross per 24 hour   Intake 240 ml   Output 1000 ml   Net -760 ml       Admission weight:Weight: 65.8 kg (145 lb) (01/01/20 1903)    Last Weight Metrics:  Weight Loss Metrics 1/7/2020 12/30/2019 12/24/2019 11/27/2017 8/28/2017 2/18/2010   Today's Wt 113 lb 11.1 oz 134 lb 134 lb 167 lb 166 lb 0.1 oz 173 lb   BMI 19.52 kg/m2 23.74 kg/m2 23.74 kg/m2 29.58 kg/m2 29.41 kg/m2 29.68 kg/m2            Physical Exam:     General: No acute distress. Neck: no jvd. LUNGS:Clear to Auscultation, No reales, rhonchi or wheezes. CVS EXM: S1, S2, RRR. Abdomen: Soft, Non Tender, non distended. Lower Extremities: No Edema. Rt bka stump dressings intact. Access: rt arm avf.        Labs:    CBC w/Diff Recent Labs     01/05/20  0446 01/04/20  1855   HGB 7.4* 8.0*   HCT 23.3*  --         Chemistry Recent Labs     01/05/20  0446   *      K 4.1      CO2 28   BUN 51*   CREA 9.34*   CA 8.3*   AGAP 11   BUCR 5*          No results found for: IRON, FE, TIBC, IBCT, PSAT, FERR   Lab Results   Component Value Date/Time    Calcium 8.3 (L) 01/05/2020 04:46 AM    CALCIUM,IONIZED 4.20 (L) 08/28/2017 10:40 AM          Noa Last M.D  [facility]  Office [phone]  Pager [phone]

## 2020-01-07 NOTE — PROGRESS NOTES
Problem: Mobility Impaired (Adult and Pediatric)  Goal: *Acute Goals and Plan of Care (Insert Text)  Description  Physical Therapy Goals  Initiated 1/5/2020 and to be accomplished within 7 day(s)  1. Patient will move from supine to sit and sit to supine , scoot up and down and roll side to side in bed with independence. 2.  Patient will transfer from bed to chair and chair to bed with minimal assistance/contact guard assist using the least restrictive device. 3.  Patient will perform sit to stand with minimal assistance/contact guard assist.  4.  Patient will ambulate with minimal assistance/contact guard assist for 10 feet with the least restrictive device. Outcome: Resolved/Not Met   PHYSICAL THERAPY TREATMENT AND DISCHARGE    Patient: Yovani Marley (66 y.o. male)  Date: 1/7/2020  Diagnosis: ACS (acute coronary syndrome) ([facility]) [I24.9]  ACS (acute coronary syndrome) ([facility]) [I24.9]   ACS (acute coronary syndrome) ([facility])  Procedure(s) (LRB):  CORONARY ANGIOGRAPHY (N/A) 6 Days Post-Op  Precautions: Fall, Skin  PLOF: Wheelchair  ASSESSMENT:  RLE RAYRAY. Oriented to self. Verbalizes her speaks english; however limited understanding/command following during session. Perseverates on son's phone number. Max A for supine to sit first attempt. Min A for supine to sit second attempt. Seated supervision at EOB. Unable to demonstrate AROM on LLE knee; PROM reveals significant flexion contracture at left knee. Returned to supine with supervision. Scooting self in bed with supervision. Not appropriate for skilled PT treatment d/t decreased command following and prior level of function. Educated on need for RN assistance with mobility; verbalized understanding. Call bell in reach. PLAN:  Further skilled physical therapy is not indicated at this time.   Rationale for discharge:  []     Goals Achieved  []     Plateau Reached  []     Patient not participating in therapy  [x]     Other: Not appropriate  Discharge Recommendations:  Long term care  Further Equipment Recommendations for Discharge:  hospital bed and wheelchair      SUBJECTIVE:   Patient stated Son.     OBJECTIVE DATA SUMMARY:   Critical Behavior:  Neurologic State: Confused  Orientation Level: Oriented to person  Cognition: Decreased attention/concentration, Impaired decision making     Functional Mobility Training:  Bed Mobility:  Rolling: Supervision  Supine to Sit: Maximum assistance;Minimum assistance  Sit to Supine: Supervision  Balance:  Sitting: Impaired  Sitting - Static: Good (unsupported)  Sitting - Dynamic: Good (unsupported)  Neuro Re-Education:  Seated EOB 3 minutes    Pain:  Pain level pre-treatment: 0/10   Pain level post-treatment: 0/10     Activity Tolerance:   Poor     After treatment:   [] Patient left in no apparent distress sitting up in chair  [x] Patient left in no apparent distress in bed  [x] Call bell left within reach  [x] Nursing notified  [] Caregiver present  [x] Bed alarm activated  [] SCDs applied      COMMUNICATION/EDUCATION:   [x]         Role of physical therapy in the acute care setting. [x]         Fall prevention education was provided and the patient/caregiver indicated understanding. [x]         Patient/family have participated as able and agree with findings and recommendations. []         Patient is unable to participate in plan of care at this time.        Stevie Santoyo, PT   Time Calculation: 8 mins

## 2020-01-07 NOTE — PROGRESS NOTES
Pt to transfer to [place] today via [address] transport. Called [facility] to obtain auth for [facility] for transport, auth# [id number]. Spoke with pt's son, Emerita Rose, & made him aware of above, asked me to speak with his cousin Deloris March. States they do not want pt to return to [facility] would like him in [place]. Made him aware that can send out to [place] but if no one accepts he will need to go back to [facility]. Spoke with Khurram & made him aware of above. Informed him that pt has been discharged, states he thought pt would be in the hospital for long term. Made him aware that pt's get discharged when MD states they are medically stable. Insisted that we try to find alternate SNF. Asked to Speak with MD. Cortez Andrews with Dr. Praveena Andrade & made him aware that family wishes to speak with him, states he already spoke with the son. Will cancel discharge for today. Spoke with Khurram & made him aware that discharge cancelled so an attempt to find alternate bed can be made. Informed him that if no accepting bed by tomorrow, he will need to return to [place], verbalized understanding. Posted in e-discharge to all [facility]. [facility], changed transport to tomorrow as will call. Will cont to follow. Jaycee Gustafson RN,ext [phone].

## 2020-01-07 NOTE — PROGRESS NOTES
Problem: Discharge Planning  Goal: *Discharge to safe environment  1/7/2020 1307 by Georgina Monroy RN  Outcome: Resolved/Met  1/7/2020 1026 by Georgina Monroy RN  Outcome: Progressing Towards Goal     Problem: Falls - Risk of  Goal: *Absence of Falls  Description  Document Tyler Zayas Fall Risk and appropriate interventions in the flowsheet.   1/7/2020 1307 by Georgina Monroy RN  Outcome: Resolved/Met  Note: Fall Risk Interventions:  Mobility Interventions: Bed/chair exit alarm, PT Consult for mobility concerns, PT Consult for assist device competence, Strengthening exercises (ROM-active/passive), Utilize walker, cane, or other assistive device    Mentation Interventions: Bed/chair exit alarm, Door open when patient unattended, More frequent rounding, Reorient patient, Room close to nurse's station, Update white board    Medication Interventions: Bed/chair exit alarm    Elimination Interventions: Bed/chair exit alarm, Call light in reach, Patient to call for help with toileting needs, Toilet paper/wipes in reach, Toileting schedule/hourly rounds, Urinal in reach    History of Falls Interventions: Bed/chair exit alarm, Door open when patient unattended, Investigate reason for fall, Room close to nurse's station      1/7/2020 1026 by Georgina Monroy RN  Outcome: Progressing Towards Goal  Note: Fall Risk Interventions:  Mobility Interventions: Bed/chair exit alarm, PT Consult for mobility concerns, PT Consult for assist device competence, Strengthening exercises (ROM-active/passive), Utilize walker, cane, or other assistive device    Mentation Interventions: Bed/chair exit alarm, Door open when patient unattended, More frequent rounding, Reorient patient, Room close to nurse's station, Update white board    Medication Interventions: Bed/chair exit alarm    Elimination Interventions: Bed/chair exit alarm, Call light in reach, Patient to call for help with toileting needs, Toilet paper/wipes in reach, Toileting schedule/hourly rounds, Urinal in reach    History of Falls Interventions: Bed/chair exit alarm, Door open when patient unattended, Investigate reason for fall, Room close to nurse's station         Problem: Patient Education: Go to Patient Education Activity  Goal: Patient/Family Education  1/7/2020 1307 by Eduardo Madrid RN  Outcome: Resolved/Met  1/7/2020 1026 by Eduardo Madrid RN  Outcome: Progressing Towards Goal     Problem: Pressure Injury - Risk of  Goal: *Prevention of pressure injury  Description  Document Enrique Scale and appropriate interventions in the flowsheet. 1/7/2020 1307 by Eduardo Madrid RN  Outcome: Resolved/Met  Note: Pressure Injury Interventions:  Sensory Interventions: Assess need for specialty bed, Avoid rigorous massage over bony prominences, Keep linens dry and wrinkle-free, Maintain/enhance activity level, Minimize linen layers, Monitor skin under medical devices, Pressure redistribution bed/mattress (bed type), Turn and reposition approx. every two hours (pillows and wedges if needed)    Moisture Interventions: Check for incontinence Q2 hours and as needed, Maintain skin hydration (lotion/cream), Minimize layers, Moisture barrier    Activity Interventions: Pressure redistribution bed/mattress(bed type), PT/OT evaluation    Mobility Interventions: HOB 30 degrees or less, Pressure redistribution bed/mattress (bed type), PT/OT evaluation, Turn and reposition approx.  every two hours(pillow and wedges)    Nutrition Interventions: Document food/fluid/supplement intake    Friction and Shear Interventions: HOB 30 degrees or less, Minimize layers, Transferring/repositioning devices             1/7/2020 1026 by Eduardo CASTRO RN  Outcome: Progressing Towards Goal  Note: Pressure Injury Interventions:  Sensory Interventions: Assess need for specialty bed, Avoid rigorous massage over bony prominences, Keep linens dry and wrinkle-free, Maintain/enhance activity level, Minimize linen layers, Monitor skin under medical devices, Pressure redistribution bed/mattress (bed type), Turn and reposition approx. every two hours (pillows and wedges if needed)    Moisture Interventions: Check for incontinence Q2 hours and as needed, Maintain skin hydration (lotion/cream), Minimize layers, Moisture barrier    Activity Interventions: Pressure redistribution bed/mattress(bed type), PT/OT evaluation    Mobility Interventions: HOB 30 degrees or less, Pressure redistribution bed/mattress (bed type), PT/OT evaluation, Turn and reposition approx.  every two hours(pillow and wedges)    Nutrition Interventions: Document food/fluid/supplement intake    Friction and Shear Interventions: HOB 30 degrees or less, Minimize layers, Transferring/repositioning devices                Problem: Patient Education: Go to Patient Education Activity  Goal: Patient/Family Education  1/7/2020 1307 by Brittanie Mcnair RN  Outcome: Resolved/Met  1/7/2020 1026 by Brittanie Mcnair RN  Outcome: Progressing Towards Goal     Problem: Pain  Goal: *Control of Pain  1/7/2020 1307 by Brittanie Mcnair RN  Outcome: Resolved/Met  1/7/2020 1026 by Brittanie Mcnair RN  Outcome: Progressing Towards Goal  Goal: *PALLIATIVE CARE:  Alleviation of Pain  1/7/2020 1307 by Brittanie Mcnair RN  Outcome: Resolved/Met  1/7/2020 1026 by Brittanie Mcnair RN  Outcome: Progressing Towards Goal     Problem: Patient Education: Go to Patient Education Activity  Goal: Patient/Family Education  1/7/2020 1307 by Brittanie Mcnair RN  Outcome: Resolved/Met  1/7/2020 1026 by Brittanie Mcnair RN  Outcome: Progressing Towards Goal     Problem: Anemia Care Plan (Adult and Pediatric)  Goal: *Labs within defined limits  1/7/2020 1307 by Brittanie Mcnair RN  Outcome: Resolved/Met  1/7/2020 1026 by Brittanie Mcnair RN  Outcome: Progressing Towards Goal  Goal: *Tolerates increased activity  1/7/2020 1307 by Ellie Jeronimo RN  Outcome: Resolved/Met  1/7/2020 1026 by Ellie Jeronimo RN  Outcome: Progressing Towards Goal     Problem: Patient Education: Go to Patient Education Activity  Goal: Patient/Family Education  1/7/2020 1307 by Ellie Jeronimo, RN  Outcome: Resolved/Met  1/7/2020 1026 by Ellie Jeronimo RN  Outcome: Progressing Towards Goal     Problem: Discharge Planning  Goal: *Discharge to safe environment  1/7/2020 1307 by Ellie Jeronimo RN  Outcome: Resolved/Met  1/7/2020 1026 by Ellie Jeronimo RN  Outcome: Progressing Towards Goal     Problem: Nutrition Deficit  Goal: *Optimize nutritional status  1/7/2020 1307 by Ellie Jeronimo RN  Outcome: Resolved/Met  1/7/2020 1026 by Ellie Jeronimo RN  Outcome: Progressing Towards Goal     Problem: Patient Education: Go to Patient Education Activity  Goal: Patient/Family Education  1/7/2020 1307 by Ellie Jeronimo RN  Outcome: Resolved/Met  1/7/2020 1026 by Ellie Jeronimo RN  Outcome: Progressing Towards Goal

## 2020-01-07 NOTE — PROGRESS NOTES
Problem: Discharge Planning  Goal: *Discharge to safe environment  Outcome: Not Progressing Towards Goal     Problem: Discharge Planning  Goal: *Discharge to safe environment  Outcome: Not Progressing Towards Goal     Problem: Falls - Risk of  Goal: *Absence of Falls  Description  Document Kelly Kearnsner Fall Risk and appropriate interventions in the flowsheet. Outcome: Not Progressing Towards Goal  Note: Fall Risk Interventions:  Mobility Interventions: Bed/chair exit alarm    Mentation Interventions: Bed/chair exit alarm, Room close to nurse's station, More frequent rounding    Medication Interventions: Bed/chair exit alarm, Patient to call before getting OOB, Teach patient to arise slowly    Elimination Interventions: Toilet paper/wipes in reach, Toileting schedule/hourly rounds    History of Falls Interventions: Door open when patient unattended, Bed/chair exit alarm, Investigate reason for fall         Problem: Patient Education: Go to Patient Education Activity  Goal: Patient/Family Education  Outcome: Not Progressing Towards Goal     Problem: Pressure Injury - Risk of  Goal: *Prevention of pressure injury  Description  Document Enrique Scale and appropriate interventions in the flowsheet. Outcome: Not Progressing Towards Goal  Note: Pressure Injury Interventions:  Sensory Interventions: Assess changes in LOC, Turn and reposition approx.  every two hours (pillows and wedges if needed)    Moisture Interventions: Minimize layers    Activity Interventions: Pressure redistribution bed/mattress(bed type), PT/OT evaluation    Mobility Interventions: Pressure redistribution bed/mattress (bed type)    Nutrition Interventions: Document food/fluid/supplement intake    Friction and Shear Interventions: Lift team/patient mobility team                Problem: Patient Education: Go to Patient Education Activity  Goal: Patient/Family Education  Outcome: Not Progressing Towards Goal     Problem: Pain  Goal: *Control of Pain  Outcome: Not Progressing Towards Goal  Goal: *PALLIATIVE CARE:  Alleviation of Pain  Outcome: Not Progressing Towards Goal     Problem: Patient Education: Go to Patient Education Activity  Goal: Patient/Family Education  Outcome: Not Progressing Towards Goal     Problem: Anemia Care Plan (Adult and Pediatric)  Goal: *Labs within defined limits  Outcome: Not Progressing Towards Goal  Goal: *Tolerates increased activity  Outcome: Not Progressing Towards Goal

## 2020-01-07 NOTE — PROGRESS NOTES
Problem: Discharge Planning  Goal: *Discharge to safe environment  Outcome: Progressing Towards Goal     Problem: Falls - Risk of  Goal: *Absence of Falls  Description  Document Clide Failing Fall Risk and appropriate interventions in the flowsheet. Outcome: Progressing Towards Goal  Note: Fall Risk Interventions:  Mobility Interventions: Bed/chair exit alarm, PT Consult for mobility concerns, PT Consult for assist device competence, Strengthening exercises (ROM-active/passive), Utilize walker, cane, or other assistive device    Mentation Interventions: Bed/chair exit alarm, Door open when patient unattended, More frequent rounding, Reorient patient, Room close to nurse's station, Update white board    Medication Interventions: Bed/chair exit alarm    Elimination Interventions: Bed/chair exit alarm, Call light in reach, Patient to call for help with toileting needs, Toilet paper/wipes in reach, Toileting schedule/hourly rounds, Urinal in reach    History of Falls Interventions: Bed/chair exit alarm, Door open when patient unattended, Investigate reason for fall, Room close to nurse's station         Problem: Patient Education: Go to Patient Education Activity  Goal: Patient/Family Education  Outcome: Progressing Towards Goal     Problem: Pressure Injury - Risk of  Goal: *Prevention of pressure injury  Description  Document Enrique Scale and appropriate interventions in the flowsheet. Outcome: Progressing Towards Goal  Note: Pressure Injury Interventions:  Sensory Interventions: Assess need for specialty bed, Avoid rigorous massage over bony prominences, Keep linens dry and wrinkle-free, Maintain/enhance activity level, Minimize linen layers, Monitor skin under medical devices, Pressure redistribution bed/mattress (bed type), Turn and reposition approx.  every two hours (pillows and wedges if needed)    Moisture Interventions: Check for incontinence Q2 hours and as needed, Maintain skin hydration (lotion/cream), Minimize layers, Moisture barrier    Activity Interventions: Pressure redistribution bed/mattress(bed type), PT/OT evaluation    Mobility Interventions: HOB 30 degrees or less, Pressure redistribution bed/mattress (bed type), PT/OT evaluation, Turn and reposition approx.  every two hours(pillow and wedges)    Nutrition Interventions: Document food/fluid/supplement intake    Friction and Shear Interventions: HOB 30 degrees or less, Minimize layers, Transferring/repositioning devices                Problem: Patient Education: Go to Patient Education Activity  Goal: Patient/Family Education  Outcome: Progressing Towards Goal     Problem: Pain  Goal: *Control of Pain  Outcome: Progressing Towards Goal  Goal: *PALLIATIVE CARE:  Alleviation of Pain  Outcome: Progressing Towards Goal     Problem: Patient Education: Go to Patient Education Activity  Goal: Patient/Family Education  Outcome: Progressing Towards Goal     Problem: Anemia Care Plan (Adult and Pediatric)  Goal: *Labs within defined limits  Outcome: Progressing Towards Goal  Goal: *Tolerates increased activity  Outcome: Progressing Towards Goal     Problem: Patient Education: Go to Patient Education Activity  Goal: Patient/Family Education  Outcome: Progressing Towards Goal     Problem: Discharge Planning  Goal: *Discharge to safe environment  Outcome: Progressing Towards Goal     Problem: Nutrition Deficit  Goal: *Optimize nutritional status  Outcome: Progressing Towards Goal     Problem: Patient Education: Go to Patient Education Activity  Goal: Patient/Family Education  Outcome: Progressing Towards Goal

## 2020-01-07 NOTE — PROGRESS NOTES
conducted a Follow up consultation and Spiritual Assessment for Jyoti Mora, who is a 61 y.o.,male. The  provided the following Interventions:  Continued the relationship of care and support. Listened empathically. Offered prayer and assurance of continued prayer on patients behalf. The following outcomes were achieved:  Patient expressed gratitude for 's visit. Assessment:  There are no further spiritual or Anabaptism issues which require Spiritual Care Services interventions at this time. Plan:  Chaplains will continue to follow and will provide pastoral care on an as needed/requested basis.  recommends bedside caregivers page  on duty if patient shows signs of acute spiritual or emotional distress.        [address]   [phone]

## 2020-01-07 NOTE — PROGRESS NOTES
0730:  Bedside and Verbal shift change report given to Naren Uribe RN (oncoming nurse) by Marcelino Ochoa, RN (offgoing nurse). Report included the following information SBAR, Kardex, MAR and Recent Results. 9036:  Administered due medications, patient tolerated well. Aide at bedside bathing patient. 1130:  Bed alarming, assisted patient with urinal, bed alarm reactivated. [id number]:  Informed by Case Management patient to be D/C back to [facility] @ 1500.    1304:  Assisted patient with BP, patient had large BM. 1334:  Calling report to Juan Bartholomew,  approached me to cancel discharge until tomorrow due to family no wanting patient to return to facility. 1755:  Administered due meds, patient tolerated well.    1930:  Bedside and Verbal shift change report given to Marcelino Ochoa RN (oncoming nurse) by Naren Uribe RN (offgoing nurse). Report included the following information SBAR, Kardex, MAR and Recent Results.

## 2020-01-07 NOTE — DISCHARGE SUMMARY
Internal Medicine Discharge Summary        Patient: Ricardo Mccall    YOB: 1960    Age:  61 y.o. Admit Date: 1/1/2020    Discharge Date: 1/7/2020    LOS:  LOS: 6 days     Discharge To: SNF    Consults: Cardiology and Nephrology    Admission Diagnoses: ACS (acute coronary syndrome) [facility]) [I24.9]  ACS (acute coronary syndrome) ([place] [address].) [I24.9]    Discharge Diagnoses:    Problem List as of 1/7/2020 Never Reviewed          Codes Class Noted - Resolved    Dementia ([place] [address].) ICD-10-CM: F03.90  ICD-9-CM: 294.20  1/5/2020 - Present        Acute blood loss anemia ICD-10-CM: D62  ICD-9-CM: 285.1  1/1/2020 - Present        ESRD (end stage renal disease) ([place] [address].) ICD-10-CM: N18.6  ICD-9-CM: 585.6  1/1/2020 - Present        HTN (hypertension) ICD-10-CM: I10  ICD-9-CM: 401.9  1/1/2020 - Present        HLD (hyperlipidemia) ICD-10-CM: E78.5  ICD-9-CM: 272.4  1/1/2020 - Present        DM (diabetes mellitus) ([place] [address].) ICD-10-CM: E11.9  ICD-9-CM: 250.00  1/1/2020 - Present        * (Principal) ACS (acute coronary syndrome) ([place] [address].) ICD-10-CM: I24.9  ICD-9-CM: 411.1  1/1/2020 - Present        GI bleed ICD-10-CM: K92.2  ICD-9-CM: 578.9  1/1/2020 - Present              Discharge Condition:  Improved    Procedures: None         HPI: Ricardo Mccall is a 61y.o. year old male who presents from [facility] with what appears to be a very poor report. Apparently he was \"altered\" and family requested he sent to ed. In ED he complains of being cold and complains of heart pain. He speaks minimal english. He is found to have a hb of 5.3, an ekg is done and stemi code called-  Appears III has st elevation and anterior lateral leads have some ST depression and t wave inversions. STEMI canceled. Cards consulted and recommended anticoagulation but stabilization before further treatment (cath)    Hospital Course:    NSTEMI/ACS - Cardiology consulted.  Stated history of severe CAD with cardiac cath in April 2019 showing severe native three-vessel CAD which was not amenable to PCI or thought was patient is not a candidate for CABG and it would not be beneficial.  Medical management was planned. Stated not a candidate for anticoagulation or antiplatelet because of severe anemia requiring blood transfusions. The patient was monitored and had no further chest pain after initial 48 hours and remained at baseline for remainder of hospitalization. Given his GI bleed, his coumadin and plavix were held and this should be readdressed with follow up cardiology in 2-3 weeks. May be able to restart ASA/plavix in several weeks if ok by GI. Acute GI bleed - Patient's hgb dropped to low of 5.3 but recovered with blood transfusions and remained stable between 7-8. He had no further bloody bowel movements after episode on 01/03. His coumadin and plavix were held and patient medically managed after initial discussion with family wanting comfort measures. The family then wanted aggressive care, but the bleeding had stopped by then. Recommend follow up with GI in 1 week for further work-up given no further acute issues and stable hemoglobin. Will hold coumadin and plavix at discharge until reevaluation by cardiology and GI in outpatient setting. ESRD on HD - Managed by nephrology during inpatient stay. No complications. The rest of the patient's chronic conditions were managed appropriately during their admission. They were medically stable at the time of discharge.     Visit Vitals  /77 (BP 1 Location: Right arm, BP Patient Position: At rest)   Pulse 82   Temp 97.6 °F (36.4 °C)   Resp 14   Ht 5' 4\" (1.626 m)   Wt 51.6 kg (113 lb 11.1 oz)   SpO2 97%   BMI 19.52 kg/m²       Physical Exam at Discharge:  General Appearance: NAD, conversant  HENT: normocephalic/atraumatic, moist mucus membranes  Lungs: CTA with normal respiratory effort  CV: RRR, no m/r/g  Abdomen: soft, non-tender, normal bowel sounds  Neuro: moves all extremities, no focal deficits  Psych: appropriate affect, alert and oriented to person, place    Labs Prior to Discharge:  Labs: Results:       Chemistry Recent Labs     01/05/20  0446   *      K 4.1      CO2 28   BUN 51*   CREA 9.34*   CA 8.3*   AGAP 11   BUCR 5*      CBC w/Diff Recent Labs     01/05/20  0446 01/04/20  1855   HGB 7.4* 8.0*   HCT 23.3*  --       Cardiac Enzymes No results for input(s): CPK, CKND1, CLEMENTINE in the last 72 hours. No lab exists for component: CKRMB, TROIP   Coagulation No results for input(s): PTP, INR, APTT, INREXT in the last 72 hours. Lipid Panel Lab Results   Component Value Date/Time    Cholesterol, total 327 (H) 02/05/2013 02:43 PM    HDL Cholesterol 39 (L) 02/05/2013 02:43 PM    LDL, calculated 231.8 02/05/2013 02:43 PM    VLDL, calculated 56.2 02/05/2013 02:43 PM    Triglyceride 281 (H) 02/05/2013 02:43 PM    CHOL/HDL Ratio 8.4 (H) 02/05/2013 02:43 PM      BNP No results for input(s): BNPP in the last 72 hours. Liver Enzymes No results for input(s): TP, ALB, TBIL, AP, SGOT, GPT in the last 72 hours. No lab exists for component: DBIL   Thyroid Studies Lab Results   Component Value Date/Time    TSH 1.28 02/05/2013 02:43 PM            Significant Imaging:  Xr Knee Rt Max 2 Vws    Result Date: 12/30/2019  EXAM: Right knee INDICATION: Right knee pain at A site COMPARISON: None. _______________ FINDINGS: 3 views were performed. Patient is status post recent below-the-knee amputation. Skin staples are present at the stump. Distal margins of the amputated tibia and fibula appear sharply defined. No periosteal reaction. Articular surfaces are normal. Small suprapatellar effusion. No soft tissue gas. Extensive posterior vascular calcifications are present. There is also suggestion of possible soft tissue calcification in the stump. _______________     IMPRESSION: 1. Status post below the knee amputation on the right. No evidence of osteomyelitis. No definite soft tissue gas.  2. Small effusion. 3. Atherosclerosis. Xr Knee Rt 3 V    Result Date: 1/2/2020  EXAM: XR KNEE RT 3 V CLINICAL INDICATION/HISTORY: Right knee pain and swelling  COMPARISON: 12/30/2019 TECHNIQUE: AP, obliques, and lateral views of the right knee were obtained. _______________ FINDINGS:  Surgical changes of prior proximal diaphyseal right tibia and fibular amputations. There is minimal curvilinear heterotopic ossification along the posterior aspect of the proximal right tibial diaphysis. Diffuse atherosclerotic vascular calcification. Soft tissue irregularity is seen adjacent to the skin staples. No soft tissue emphysema. Mild distal quadriceps insertional tendinosis and enthesopathy. Joint spaces are well preserved. No joint effusion is present. Remaining periarticular soft tissues are otherwise normal in appearance _______________     IMPRESSION: Swelling throughout distal aspects of right leg stump soft tissues, most of which appears to be postsurgical. No convincing radiographic findings of cellulitis, fasciitis or osteomyelitis. Discharge Medications:     Current Discharge Medication List      CONTINUE these medications which have NOT CHANGED    Details   atorvastatin (LIPITOR) 80 mg tablet Take 80 mg by mouth daily. metoprolol succinate (TOPROL-XL) 25 mg XL tablet Take 25 mg by mouth two (2) times a day. lisinopril (PRINIVIL, ZESTRIL) 2.5 mg tablet Take 2.5 mg by mouth daily. chlorproMAZINE (THORAZINE) 10 mg tablet Take 10 mg by mouth three (3) times daily. glimepiride (AMARYL) 1 mg tablet Take 1 mg by mouth Daily (before breakfast). nitroglycerin (NITROSTAT) 0.4 mg SL tablet by SubLINGual route every five (5) minutes as needed for Chest Pain. sevelamer carbonate (RENVELA) 800 mg tab tab Take 4 tablets by mouth three times daily with meals and 4 tablets by mouth twice daily with snacks      cholecalciferol, vitamin D3, (VITAMIN D3) 2,000 unit tab Take  by mouth daily. amiodarone (CORDARONE) 200 mg tablet Take 200 mg by mouth daily. fenofibrate nanocrystallized (TRICOR) 145 mg tablet Take 145 mg by mouth daily. STOP taking these medications       isosorbide mononitrate ER (IMDUR) 30 mg tablet Comments:   Reason for Stopping:         clopidogrel (PLAVIX) 75 mg tab Comments:   Reason for Stopping:         gabapentin (NEURONTIN) 300 mg capsule Comments:   Reason for Stopping:         linagliptin (TRADJENTA) 5 mg tablet Comments:   Reason for Stopping:         warfarin (COUMADIN) 5 mg tablet Comments:   Reason for Stopping:         metformin (GLUCOPHAGE) 1,000 mg tablet Comments:   Reason for Stopping:         aspirin delayed-release 81 mg tablet Comments:   Reason for Stopping:               Activity: PT/OT Eval and Treat    Diet: Resume previous diet    Wound Care: As directed    Follow-up:   Please follow up with your PCP within 7 days to discuss your recent hospitalization. Patient to arrange.   GI in 1 week for further evaluation of GI bleed  Cardiology in 2 weeks follow up NSTEMI         Total time spent including time spent on final examination and discharge discussion, discharge documentation and records reviewed and medication reconciliation: > 30 minutes    Akil Madison DO  Internal Medicine, Hospitalist  Pager: [phone]

## 2020-01-07 NOTE — PROGRESS NOTES
1900  -- Bedside, Verbal and Written shift change report given to [facility] (oncoming nurse) by ASHIA (offgoing nurse). Report included the following information SBAR, Kardex, Intake/Output, MAR and Recent Results. Pt is confused and becomes increasingly combative when attempting to collect vitals or perform assessment. CCL Charlie and [employer] at bedside to assist , but pt continues to refuse at attempts for vitals and assessments. Discussed with RN on clarity of POA and Hospice concerns, will continue to follow up with Case Management. 1944 - 6771 -- Pt is confused and becomes increasingly combative when attempting to collect vitals or perform assessment. CCL Charlie and [employer] at bedside to assist. Pt has made at minimum 7 attempts OOB, pt reorient and redirected. Pt continues to refuse at attempts for vitals and assessments.       0000 -- Shift reassessment, pt condition unchanged, will continue to monitor. Pt is confused and becomes increasingly combative when attempting to collect vitals or perform assessment. attempted to assist with me pt (concern for language barrier), but pt continues to refuse at attempts for vitals and assessments. 0700  -- Bedside, Verbal and Written shift change report given to [address] (oncoming nurse) by ADRIANNE (offgoing nurse). Report included the following information SBAR, Kardex, Intake/Output, MAR and Recent Results. Skin assessment completed.

## 2020-01-08 VITALS
RESPIRATION RATE: 18 BRPM | SYSTOLIC BLOOD PRESSURE: 130 MMHG | DIASTOLIC BLOOD PRESSURE: 71 MMHG | HEIGHT: 64 IN | WEIGHT: 113.69 LBS | TEMPERATURE: 97.4 F | BODY MASS INDEX: 19.41 KG/M2 | OXYGEN SATURATION: 99 % | HEART RATE: 79 BPM

## 2020-01-08 PROCEDURE — 90935 HEMODIALYSIS ONE EVALUATION: CPT

## 2020-01-08 PROCEDURE — 74011250637 HC RX REV CODE- 250/637: Performed by: HOSPITALIST

## 2020-01-08 PROCEDURE — 74011250636 HC RX REV CODE- 250/636: Performed by: INTERNAL MEDICINE

## 2020-01-08 RX ADMIN — EPOETIN ALFA-EPBX 10000 UNITS: 10000 INJECTION, SOLUTION INTRAVENOUS; SUBCUTANEOUS at 13:52

## 2020-01-08 RX ADMIN — ACETAMINOPHEN 650 MG: 325 TABLET, FILM COATED ORAL at 08:49

## 2020-01-08 RX ADMIN — ATORVASTATIN CALCIUM 80 MG: 40 TABLET, FILM COATED ORAL at 08:40

## 2020-01-08 RX ADMIN — METOPROLOL SUCCINATE 25 MG: 25 TABLET, EXTENDED RELEASE ORAL at 17:24

## 2020-01-08 RX ADMIN — AMIODARONE HYDROCHLORIDE 200 MG: 200 TABLET ORAL at 08:40

## 2020-01-08 NOTE — DISCHARGE SUMMARY
Internal Medicine Discharge Summary        Patient: Estelita Barbosa    YOB: 1960    Age:  61 y.o. Admit Date: 1/1/2020    Discharge Date: 1/8/2020    LOS:  LOS: 7 days     Discharge To: SNF    Consults: Cardiology and Nephrology    Admission Diagnoses: ACS (acute coronary syndrome) [facility]) [I24.9]  ACS (acute coronary syndrome) ([place] [address] [I24.9]    Discharge Diagnoses:    Problem List as of 1/8/2020 Never Reviewed          Codes Class Noted - Resolved    Dementia ([place] [address] ICD-10-CM: F03.90  ICD-9-CM: 294.20  1/5/2020 - Present        Acute blood loss anemia ICD-10-CM: D62  ICD-9-CM: 285.1  1/1/2020 - Present        ESRD (end stage renal disease) ([place] [address] ICD-10-CM: N18.6  ICD-9-CM: 585.6  1/1/2020 - Present        HTN (hypertension) ICD-10-CM: I10  ICD-9-CM: 401.9  1/1/2020 - Present        HLD (hyperlipidemia) ICD-10-CM: E78.5  ICD-9-CM: 272.4  1/1/2020 - Present        DM (diabetes mellitus) ([place] [address] ICD-10-CM: E11.9  ICD-9-CM: 250.00  1/1/2020 - Present        * (Principal) ACS (acute coronary syndrome) ([place] [address] ICD-10-CM: I24.9  ICD-9-CM: 411.1  1/1/2020 - Present        GI bleed ICD-10-CM: K92.2  ICD-9-CM: 578.9  1/1/2020 - Present              Discharge Condition:  Improved    Procedures: None         HPI: Estelita Barbosa is a 61y.o. year old male who presents from [facility] with what appears to be a very poor report. Apparently he was \"altered\" and family requested he sent to ed. In ED he complains of being cold and complains of heart pain. He speaks minimal english. He is found to have a hb of 5.3, an ekg is done and stemi code called-  Appears III has st elevation and anterior lateral leads have some ST depression and t wave inversions. STEMI canceled. Cards consulted and recommended anticoagulation but stabilization before further treatment (cath)    Hospital Course:    NSTEMI/ACS - Cardiology consulted.  Stated history of severe CAD with cardiac cath in April 2019 showing severe native three-vessel CAD which was not amenable to PCI or thought was patient is not a candidate for CABG and it would not be beneficial.  Medical management was planned. Stated not a candidate for anticoagulation or antiplatelet because of severe anemia requiring blood transfusions. The patient was monitored and had no further chest pain after initial 48 hours and remained at baseline for remainder of hospitalization. Given his GI bleed, his coumadin and plavix were held and this should be readdressed with follow up cardiology in 2-3 weeks. May be able to restart ASA/plavix in several weeks if ok by GI. Acute GI bleed - Patient's hgb dropped to low of 5.3 but recovered with blood transfusions and remained stable between 7-8. He had no further bloody bowel movements after episode on 01/03. His coumadin and plavix were held and patient medically managed after initial discussion with family wanting comfort measures. The family then wanted aggressive care, but the bleeding had stopped by then. Recommend follow up with GI in 1 week for further work-up given no further acute issues and stable hemoglobin. Will hold coumadin and plavix at discharge until reevaluation by cardiology and GI in outpatient setting. ESRD on HD - Managed by nephrology during inpatient stay. No complications. The rest of the patient's chronic conditions were managed appropriately during their admission. They were medically stable at the time of discharge.     Visit Vitals  /70   Pulse 91   Temp 97.7 °F (36.5 °C)   Resp 18   Ht 5' 4\" (1.626 m)   Wt 51.6 kg (113 lb 11.1 oz)   SpO2 99%   BMI 19.52 kg/m²       Physical Exam at Discharge:  General Appearance: NAD, conversant  HENT: normocephalic/atraumatic, moist mucus membranes  Lungs: CTA with normal respiratory effort  CV: RRR, no m/r/g  Abdomen: soft, non-tender, normal bowel sounds  Neuro: moves all extremities, no focal deficits  Psych: appropriate affect, alert and oriented to person, place    Labs Prior to Discharge:  Labs: Results:       Chemistry No results for input(s): GLU, NA, K, CL, CO2, BUN, CREA, CA, AGAP, BUCR, TBIL, GPT, AP, TP, ALB, GLOB, AGRAT in the last 72 hours. CBC w/Diff No results for input(s): WBC, RBC, HGB, HCT, PLT, GRANS, LYMPH, EOS, HGBEXT, HCTEXT, PLTEXT, HGBEXT, HCTEXT, PLTEXT in the last 72 hours. Cardiac Enzymes No results for input(s): CPK, CKND1, CLEMENTINE in the last 72 hours. No lab exists for component: CKRMB, TROIP   Coagulation No results for input(s): PTP, INR, APTT, INREXT, INREXT in the last 72 hours. Lipid Panel Lab Results   Component Value Date/Time    Cholesterol, total 327 (H) 02/05/2013 02:43 PM    HDL Cholesterol 39 (L) 02/05/2013 02:43 PM    LDL, calculated 231.8 02/05/2013 02:43 PM    VLDL, calculated 56.2 02/05/2013 02:43 PM    Triglyceride 281 (H) 02/05/2013 02:43 PM    CHOL/HDL Ratio 8.4 (H) 02/05/2013 02:43 PM      BNP No results for input(s): BNPP in the last 72 hours. Liver Enzymes No results for input(s): TP, ALB, TBIL, AP, SGOT, GPT in the last 72 hours. No lab exists for component: DBIL   Thyroid Studies Lab Results   Component Value Date/Time    TSH 1.28 02/05/2013 02:43 PM            Significant Imaging:  Xr Knee Rt Max 2 Vws    Result Date: 12/30/2019  EXAM: Right knee INDICATION: Right knee pain at BKA site COMPARISON: None. _______________ FINDINGS: 3 views were performed. Patient is status post recent below-the-knee amputation. Skin staples are present at the stump. Distal margins of the amputated tibia and fibula appear sharply defined. No periosteal reaction. Articular surfaces are normal. Small suprapatellar effusion. No soft tissue gas. Extensive posterior vascular calcifications are present. There is also suggestion of possible soft tissue calcification in the stump. _______________     IMPRESSION: 1. Status post below the knee amputation on the right. No evidence of osteomyelitis. No definite soft tissue gas. 2. Small effusion. 3. Atherosclerosis. Xr Knee Rt 3 V    Result Date: 1/2/2020  EXAM: XR KNEE RT 3 V CLINICAL INDICATION/HISTORY: Right knee pain and swelling  COMPARISON: 12/30/2019 TECHNIQUE: AP, obliques, and lateral views of the right knee were obtained. _______________ FINDINGS:  Surgical changes of prior proximal diaphyseal right tibia and fibular amputations. There is minimal curvilinear heterotopic ossification along the posterior aspect of the proximal right tibial diaphysis. Diffuse atherosclerotic vascular calcification. Soft tissue irregularity is seen adjacent to the skin staples. No soft tissue emphysema. Mild distal quadriceps insertional tendinosis and enthesopathy. Joint spaces are well preserved. No joint effusion is present. Remaining periarticular soft tissues are otherwise normal in appearance _______________     IMPRESSION: Swelling throughout distal aspects of right leg stump soft tissues, most of which appears to be postsurgical. No convincing radiographic findings of cellulitis, fasciitis or osteomyelitis. Discharge Medications:     Current Discharge Medication List      CONTINUE these medications which have NOT CHANGED    Details   atorvastatin (LIPITOR) 80 mg tablet Take 80 mg by mouth daily. metoprolol succinate (TOPROL-XL) 25 mg XL tablet Take 25 mg by mouth two (2) times a day. lisinopril (PRINIVIL, ZESTRIL) 2.5 mg tablet Take 2.5 mg by mouth daily. chlorproMAZINE (THORAZINE) 10 mg tablet Take 10 mg by mouth three (3) times daily. glimepiride (AMARYL) 1 mg tablet Take 1 mg by mouth Daily (before breakfast). nitroglycerin (NITROSTAT) 0.4 mg SL tablet by SubLINGual route every five (5) minutes as needed for Chest Pain. sevelamer carbonate (RENVELA) 800 mg tab tab Take 4 tablets by mouth three times daily with meals and 4 tablets by mouth twice daily with snacks      cholecalciferol, vitamin D3, (VITAMIN D3) 2,000 unit tab Take  by mouth daily. amiodarone (CORDARONE) 200 mg tablet Take 200 mg by mouth daily. fenofibrate nanocrystallized (TRICOR) 145 mg tablet Take 145 mg by mouth daily. STOP taking these medications       isosorbide mononitrate ER (IMDUR) 30 mg tablet Comments:   Reason for Stopping:         clopidogrel (PLAVIX) 75 mg tab Comments:   Reason for Stopping:         gabapentin (NEURONTIN) 300 mg capsule Comments:   Reason for Stopping:         linagliptin (TRADJENTA) 5 mg tablet Comments:   Reason for Stopping:         warfarin (COUMADIN) 5 mg tablet Comments:   Reason for Stopping:         metformin (GLUCOPHAGE) 1,000 mg tablet Comments:   Reason for Stopping:         aspirin delayed-release 81 mg tablet Comments:   Reason for Stopping:               Activity: PT/OT Eval and Treat    Diet: Resume previous diet    Wound Care: As directed    Follow-up:   Please follow up with your PCP within 7 days to discuss your recent hospitalization. Patient to arrange.   GI in 1 week for further evaluation of GI bleed  Cardiology in 2 weeks follow up NSTEMI         Total time spent including time spent on final examination and discharge discussion, discharge documentation and records reviewed and medication reconciliation: > 30 minutes    Shirley Blank DO  Internal Medicine, Hospitalist  Pager: [phone]

## 2020-01-08 NOTE — PROGRESS NOTES
Renal Progress Note  Follow up of ESRD     Assessment/Plan:  1. ESRD. Dialysis today and continue mwf. 2. NSTEMI. EF with 20% but no overt decompensated systolic chf.   3. Low GI bleeding. 4. Severe acute blood loss anemia on anemia of ESRD. Monitor h/h, continue procrit. 5. Secondary hyperparathyroidism of ESRD. No need for phos binder at this time but agree with restarting renvela on d/c. Subjective:  Patient complaints off:   States he is \"ok\". Not agitated this am.   No cp/sob. States he is eating.      Patient Active Problem List   Diagnosis Code    Acute blood loss anemia D62    ESRD (end stage renal disease) ([facility] N18.6    HTN (hypertension) I10    HLD (hyperlipidemia) E78.5    DM (diabetes mellitus) ([facility]) E11.9    ACS (acute coronary syndrome) ([facility]) I24.9    GI bleed K92.2    [facility] F03.90       Current Facility-Administered Medications   Medication Dose Route Frequency Provider Last Rate Last Dose    epoetin leslie-epbx (RETACRIT) injection 10,000 Units  10,000 Units IntraVENous DIALYSIS MON, WED & FRI Srikanth Brown MD        atorvastatin (LIPITOR) tablet 80 mg  80 mg Oral DAILY Tila Divine H, DO   80 mg at 01/08/20 0840    metoprolol succinate (TOPROL-XL) XL tablet 25 mg  25 mg Oral BID Damari Case, DO   Stopped at 01/08/20 0840    0.9% sodium chloride infusion 250 mL  250 mL IntraVENous PRN Patricia Howell MD        0.9% sodium chloride infusion  50 mL/hr IntraVENous DIALYSIS PRN Hererra Danielle MD        amiodarone (CORDARONE) tablet 200 mg  200 mg Oral DAILY Erick Palafox MD   200 mg at 01/08/20 0840    nitroglycerin (NITROSTAT) tablet 0.4 mg  0.4 mg SubLINGual Q5MIN PRN Erick Palafox MD        acetaminophen (TYLENOL) tablet 650 mg  650 mg Oral Q4H PRN Erick Palafox MD   650 mg at 01/08/20 0849    ondansetron (ZOFRAN) injection 4 mg  4 mg IntraVENous Q4H PRN Erick Palafox MD        senna-docusate (PERICOLACE) 8.6-50 mg per tablet 2 Tab  2 Tab Oral BID PRN Aaron Linares MD        bisacodyl (DULCOLAX) suppository 10 mg  10 mg Rectal DAILY PRN Aaron Linares MD        naloxone [facility]) injection 0.4 mg  0.4 mg IntraVENous PRN Aaron Linares MD        0.9% sodium chloride infusion 250 mL  250 mL IntraVENous PRN Aaron Linares MD                 Objective:  Vitals:    01/07/20 1436 01/07/20 2010 01/08/20 0407 01/08/20 0742   BP: 121/72 113/64 119/66 125/70   Pulse: 86 84 87 91   Resp: 15 16 16 18   Temp: 97.7 °F (36.5 °C) 97.9 °F (36.6 °C) 97.5 °F (36.4 °C) 97.7 °F (36.5 °C)   TempSrc:       SpO2: 97% 97% 96% 99%   Weight:       Height:         . Intake/Output Summary (Last 24 hours) at 1/8/2020 1128  Last data filed at 1/7/2020 2010  Gross per 24 hour   Intake 1080 ml   Output 100 ml   Net 980 ml       Admission weight:Weight: 65.8 kg (145 lb) (01/01/20 1903)    Last Weight Metrics:  Weight Loss Metrics 1/7/2020 12/30/2019 12/24/2019 11/27/2017 8/28/2017 2/18/2010   Today's Wt 113 lb 11.1 oz 134 lb 134 lb 167 lb 166 lb 0.1 oz 173 lb   BMI 19.52 kg/m2 23.74 kg/m2 23.74 kg/m2 29.58 kg/m2 29.41 kg/m2 29.68 kg/m2            Physical Exam:     General: No acute distress. Neck: no jvd. LUNGS:Clear to Auscultation, No reales, rhonchi or wheezes. CVS EXM: S1, S2, RRR. Abdomen: Soft, Non Tender, non distended. Lower Extremities: No Edema. Rt bka stump dressings intact. Access: rt arm avf. Labs:    CBC w/Diff No results for input(s): WBC, RBC, HGB, HCT, PLT, GRANS, LYMPH, EOS, HGBEXT, HCTEXT, PLTEXT, HGBEXT, HCTEXT, PLTEXT in the last 72 hours. Chemistry No results for input(s): GLU, NA, K, CL, CO2, BUN, CREA, CA, AGAP, BUCR, TBIL, GPT, AP, TP, ALB, GLOB, AGRAT, PHOS in the last 72 hours.        No results found for: IRON, FE, TIBC, IBCT, PSAT, FERR   Lab Results   Component Value Date/Time    Calcium 8.3 (L) 01/05/2020 04:46 AM    CALCIUM,IONIZED 4.20 (L) 08/28/2017 10:40 AM          Raymond Johnson M.D  [facility]  Office [phone]  Pager [phone]

## 2020-01-08 NOTE — DISCHARGE INSTRUCTIONS
DISCHARGE SUMMARY from Nurse    PATIENT INSTRUCTIONS:    After general anesthesia or intravenous sedation, for 24 hours or while taking prescription Narcotics:  · Limit your activities  · Do not drive and operate hazardous machinery  · Do not make important personal or business decisions  · Do  not drink alcoholic beverages  · If you have not urinated within 8 hours after discharge, please contact your surgeon on call. Report the following to your surgeon:  · Excessive pain, swelling, redness or odor of or around the surgical area  · Temperature over 100.5  · Nausea and vomiting lasting longer than 4 hours or if unable to take medications  · Any signs of decreased circulation or nerve impairment to extremity: change in color, persistent  numbness, tingling, coldness or increase pain  · Any questions    What to do at Home:  Recommended activity: Activity as tolerated and PT/OT Eval and Treat    If you experience any of the following symptoms chest pain, worsening shortness of breath, or palpitations, please follow up with rehab staff/emergency room. *  Please give a list of your current medications to your Primary Care Provider. *  Please update this list whenever your medications are discontinued, doses are      changed, or new medications (including over-the-counter products) are added. *  Please carry medication information at all times in case of emergency situations. These are general instructions for a healthy lifestyle:    No smoking/ No tobacco products/ Avoid exposure to second hand smoke  Surgeon General's Warning:  Quitting smoking now greatly reduces serious risk to your health.     Obesity, smoking, and sedentary lifestyle greatly increases your risk for illness    A healthy diet, regular physical exercise & weight monitoring are important for maintaining a healthy lifestyle    You may be retaining fluid if you have a history of heart failure or if you experience any of the following symptoms:  Weight gain of 3 pounds or more overnight or 5 pounds in a week, increased swelling in our hands or feet or shortness of breath while lying flat in bed. Please call your doctor as soon as you notice any of these symptoms; do not wait until your next office visit. The discharge information has been reviewed with the caregiver. The caregiver verbalized understanding. Discharge medications reviewed with the caregiver and appropriate educational materials and side effects teaching were provided. Patient discharged without removing armband and transfered to another healthcare acute, sub acute , or extended care facility. Informed of privacy risks if armband lost or stolen.

## 2020-01-08 NOTE — DIALYSIS
[facility]        ACUTE HEMODIALYSIS FLOW SHEET      HEMODIALYSIS ORDERS: Physician: Dr. Joshua Matta     Dialyzer: revaclear   Duration: 4 hr  BFR: 400   DFR: 800   Dialysate:  Temp 36-37*C  K+   2    Ca+  2.5 Na 140 Bicarb 35   Weight:  51.6 kg    Patient Chart [x]     Unable to Obtain []   Dry weight/UF Goal: 1500 ml Access LUE AVF  Needle Gauge 15    Heparin []  Bolus      Units    [] Hourly       Units    [x]None      Catheter locking solution NA   Pre BP:   106/59    Pulse:     84       Respirations: 18  Temperature:   98   Labs: Pre        Post:        [x] N/A   Additional Orders(medications, blood products, hypotension management):       [x] N/A     [x] Payam Consent Verified     CATHETER ACCESS: [x]N/A   []Right   []Left   []IJ     []Fem   []chest wall   [] First use X-ray verified     []Tunnel                [] Non Tunneled   []No S/S infection  []Redness  []Drainage []Cultured []Swelling []Pain   []Medical Aseptic Prep Utilized   []Dressing Changed  [] Biopatch  Date:       []Clotted   []Patent   Flows: []Good  []Poor  []Reversed   If access problem,  notified: []Yes    [x]N/A  Date:           GRAFT/FISTULA ACCESS:  []N/A     []Right     [x]Left     [x]UE     []LE   []AVG   [x]AVF        []Buttonhole    [x]Medical Aseptic Prep Utilized   [x]No S/S infection  []Redness  []Drainage []Cultured []Swelling []Pain    Bruit:   [x] Strong    [] Weak       Thrill :   [x] Strong    [] Weak       Needle Gauge: 15    Length: 1 inch   If access problem,  notified: []Yes     [x]N/A  Date:        Please describe access if present and not used:                            GENERAL ASSESSMENT:      LUNGS:  Rate 18  SaO2%        [] N/A    [x] Clear  [] Coarse  [] Crackles  [] Wheezing        [] Diminished     Location : []RLL   []LLL    []RUL  []JANELL     Cough: []Productive  []Dry  [x]N/A   Respirations:  [x]Easy  []Labored     Therapy:   [x]RA  []NC  l/min    Mask: []NRB []Venti       O2%                  []Ventilator []Intubated  [] Trach  [] BiPaP     CARDIAC: [x]Regular      [] Irregular   [] Pericardial Rub  [] JVD        []  Monitored  [] Bedside  [] Remotely monitored [] N/A  Rhythm:      EDEMA: [x] None  []Generalized  [] Pitting [] 1    [] 2    [] 3    [] 4                 [] Facial  [] Pedal  []  UE  [] LE     SKIN:   [x] Warm  [] Hot     [] Cold   [x] Dry     [] Pale   [] Diaphoretic                  [] Flushed  [] Jaundiced  [] Cyanotic  [] Rash  [] Weeping     LOC:    [x] Alert      [x]Oriented:    [] Person     [] Place  []Time               [] Confused  [] Lethargic  [] Medicated  [] Non-responsive     GI / ABDOMEN:  [] Flat    [] Distended    [x] Soft    [] Firm   []  Obese                             [] Diarrhea  [] Bowel Sounds  [] Nausea  [] Vomiting       / URINE ASSESSMENT:[] Voiding   [] Oliguria  [] Anuria   []  Lopez     [] Incontinent    []  Incontinent Brief      []  Bathroom Privileges       PAIN: [x] 0 []1  []2   []3   []4   []5   []6   []7   []8   []9   []10              Scale 0-10  Action/Follow Up:      MOBILITY:  [] Amb    [] Amb/Assist    [] Bed    [] Wheelchair  [] Stretcher      All Vitals and Treatment Details on Attached [address]: [facility]          Room # 2968/56      [] 1st Time Acute  [] Stat  [x] Routine  [] Urgent     [x] Acute Room  []  Bedside  [] ICU/CCU  [] ER   Isolation Precautions:   There are currently no Active Isolations      Special Considerations:         [] Blood Consent Verified [x]N/A     ALLERGIES: No Known Allergies            Code Status:Full Code        Hepatitis Status:    2nd RN check:                     Lab Results   Component Value Date/Time    Hepatitis B surface Ag <0.10 11/26/2019 07:59 AM    Hepatitis B surface Ab 313.68 11/26/2019 07:59 AM                     Current Labs:   Lab Results   Component Value Date/Time    Sodium 139 01/05/2020 04:46 AM    Potassium 4.1 01/05/2020 04:46 AM    Chloride 100 01/05/2020 04:46 AM    CO2 28 01/05/2020 04:46 AM    Anion gap 11 01/05/2020 04:46 AM    Glucose 109 (H) 01/05/2020 04:46 AM    BUN 51 (H) 01/05/2020 04:46 AM    Creatinine 9.34 (H) 01/05/2020 04:46 AM    BUN/Creatinine ratio 5 (L) 01/05/2020 04:46 AM    GFR est AA 7 (L) 01/05/2020 04:46 AM    GFR est non-AA 6 (L) 01/05/2020 04:46 AM    Calcium 8.3 (L) 01/05/2020 04:46 AM      Lab Results   Component Value Date/Time    WBC 16.6 (H) 01/02/2020 09:45 PM    Hemoglobin, POC 10.2 (L) 12/24/2019 11:08 AM    HGB 7.4 (L) 01/05/2020 04:46 AM    Hematocrit, POC 30 (L) 12/24/2019 11:08 AM    HCT 23.3 (L) 01/05/2020 04:46 AM    PLATELET 144 43/42/0960 09:45 PM    MCV 90.8 01/02/2020 09:45 PM                                                                                     DIET: DIET DENTAL SOFT (SOFT SOLID)  DIET NUTRITIONAL SUPPLEMENTS [facility])       PRIMARY NURSE REPORT: First initial/Last name/Title      Pre Dialysis: AUGUSTO Verdin RN     Time: 3961      EDUCATION:    [x] Patient [] Other         Knowledge Basis: []None []Minimal [] Substantial   Barriers to learning  []N/A   [x] Access Care     [] S&S of infection     [] Fluid Management     []K+     [x]Procedural    []Albumin     [] Medications     [x] Tx Options     [] Transplant     [] Diet     [] Other   Teaching Tools:  [x] Explain  [] Demo  [] Handouts [] Video  Patient response:   [] Verbalized understanding  [] Teach back  [] Return demonstration [x] Requires follow up   Inappropriate due to       Language barrier     [x] Time Out/Safety Check  [x]Extracorporeal Circuit Tested for integrity       RO/HEMODIALYSIS MACHINE SAFETY CHECKS  Before each treatment:     Machine Number:                   [address] [facility]                                   [] Unit Machine #  with centralized RO                                  [] Portable Machine #1/RO serial # [id number]                                  [] Portable Machine #2/RO serial # [id number]                                  [] Portable Machine #4/RO serial # [id number] [address] [facility]                                  [] Portable Machine #11/RO serial # [id number]                                   [] Portable Machine #12/RO serial # [id number]                                  [x] Portable Machine #3/RO serial #  [id number]      Alarm Test:  Pass time 1010               [x] RO/Machine Log Complete      Temp    35.5             Dialysate: pH  7.4 Conductivity: Meter   14     HD Machine   14.3                  TCD: 13.9  Dialyzer Lot # [id number]            Blood Tubing Lot # [id number]          Saline Lot #  -[id number]     CHLORINE TESTING-Before each treatment and every 4 hours    Total Chlorine: [x] less than 0.1 ppm  Time: 1000 4 Hr/2nd Check Time: 1400   (if greater than 0.1 ppm from Primary then every 30 minutes from Secondary)     TREATMENT INITIATION  with Dialysis Precautions:   [x] All Connections Secured                 [x] Saline Line Double Clamped   [x] Venous Parameters Set                  [x] Arterial Parameters Set    [x] Prime Given 250ml                          [x]Air Foam Detector Engaged      Treatment Initiation Note:Patient was brought to dialysis suite, stable to begin dialysis. His LUE AVF was cannulated, and his treatment began. During Treatment Notes:Patient came to HD suite with no clothes on. He does have a sheet and a blanket on him. He did request another blanket. He frequently inadvertently occludes his HD bloodlines with his blankets, sheets, and his hand, causing pressure alarms on HD unit. He has to frequently be redirected.               Medication Dose Volume Route Time [facility] name Title   Retacrit 27992 Units 1 ml IV 1400 Cortez Melara RN                                              Post Assessment:   Dialyzer Cleared: [x] Good [] Fair  [] Poor  Blood processed:  60.5 L  UF Removed  1500 Ml  POst BP:   130/71       Pulse: 79        Respirations: 18  Temperature: 97.4 Lungs:     [x] Clear      [] Course         [] Crackles    [] Wheezing         [] Diminished   Post Tx Vascular Access:   AVF/AVG: Bleeding stopped   Art 5 min. Karlo. 5 Min    Cardiac:   [x] Regular   [] Irregular   [] Monitor  [] N/A      Rhythm:       Catheter:   N/A    Skin:   Pain:    [x] Warm  [x] Dry [] Diaphoretic    [] Flushed    [] Pale [] Cyanotic [x]0  []1  []2   []3  []4   []5   []6   []7   []8   []9   []10     Post Treatment Note:   Patient's blood was returned at the conclusion of treatment. The needles were removed from his LUE AVF, and the insertion sites were covered with gauze and tape. He was then returned to his room by hospital transportation personnel.        POST TREATMENT PRIMARY NURSE HANDOFF REPORT:     First initial/Last name/Title         Post Dialysis: Jayne Maria RN Time:  5428     Abbreviations: AVG-arterial venous graft, AVF-arterial venous fistula, IJ-Internal Jugular, Subcl-Subclavian, Fem-Femoral, Tx-treatment, AP/HR-apical heart rate, DFR-dialysate flow rate, BFR-blood flow rate, AP-arterial pressure, -venous pressure, UF-ultrafiltrate, TMP-transmembrane pressure, Karlo-Venous, Art-Arterial, RO-Reverse Osmosis

## 2020-01-08 NOTE — ROUTINE PROCESS
0700 Assumed care of patient. Pt oriented to self. Poor english speaking, able to communicate minimally needs. VSS. L AV Fistula in place, +bruit/thrill. No            maintenance fluids noted. No ring in place. Pt in no distress on RA. No visual signs or discomfort or distress at this time. Bed in low locked position and call bell within reach. 0800  Shift assessment completed. Breakfast tray provided by CNA. Pt fed.    0900 Hourly rounding. Morning meds given. Pt rubbing BKA and appearing to be in pain. PRN Tylenol given    1000 Pt off to dialysis    1500 Pt still in dialysis, to be transferred to [facility] [employer]- report called- unit bedside shift change report given to Reza (oncoming nurse) by Laura Reid RN     (offgoing nurse). Report included the following information SBAR, Kardex, Intake/Output, MAR and Recent Results.

## 2020-01-08 NOTE — PROGRESS NOTES
Called Nayeli Chau. Beth Falling in admissions was unavailable. Left message with  that [facility] needs to be faxed to [facility] office as soon as possible.  She states she will see if someone else is able to do to this us

## 2020-01-08 NOTE — PROGRESS NOTES
1900  -- Bedside, Verbal and Written shift change report given to 230Ameya Baum (oncoming nurse) by Brina nurse). Report included the following information SBAR, Kardex, Intake/Output, MAR and Recent Results. Son Christina Free at bedside feeding pt dinner.  requested a letter to indicate father is ill for brother who lives [place] of the country. Discussed with Mr. Nelda Connell about pt's scheduled discharge for 1/8/2020 and provided Case Management number to assist with letter. Mr. Nelda Connell was also made aware of challenges (pt's kicks and punches at staff when any attempt is made for care, assessment, etc. , language barrier and dx dementia) this RN has encountered in attempting to care for pt 1/5/2020 - 1/7/2020. RN requested Mr. Reyes stay at bedside to allow hygiene care   ( full bath, gown and linen change) vitals, wound care and assessment. Mr. Nelda Connell indicated that his father does not want a woman to wash him and that he would try to stay but not sure how long because he has to work in the morning. RN contacted Say Rivera (male CNA) and placed all supplies at bedside (male CNA) to provide hygiene care and attempt vitals. 2010 -- Pt allowed hygiene care and vitals, son unable to stay for assessment and left.      0000 -- Shift reassessment, pt condition unchanged, will continue to monitor.      0400 --  Shift reassessment, pt condition unchanged, will continue to monitor.         0700 -- Bedside, Verbal and Written shift change report given to GHASSAN  (oncoming nurse) by ADRIANNE (offgoing nurse). Report included the following information SBAR, Kardex, Intake/Output, MAR and Recent Results. Skin assessment completed.

## 2020-01-08 NOTE — ANCILLARY DISCHARGE INSTRUCTIONS
Patient and/or next of kin has been given the [facility] Important Message From Medicare About Your Rights\" letter and all questions were answered. Patient unable to sign, Son made aware if Important Message, copy given.

## 2020-01-08 NOTE — ROUTINE PROCESS
1545-patient returned form Dialysis, no distress noted, voiced no complaints at this time. 5135-discharged to SNF via ambulance.

## 2020-01-08 NOTE — PROGRESS NOTES
Pt accepted to [facility] for today. [facility] transport set up for 1700. Spoke with Ana Luisa in admissions & made her aware of above, states that's fine. Made her aware of the need for dialysis transport on Friday, states they will call [facility] & get it set up. Spoke with family & made them aware of above, they are agreeable. Pt's nurse made aware of above. Jaycee Gustafson,RN,ext 3934. Care Management Interventions  PCP Verified by CM:  Yes  Mode of Transport at Discharge: BLS  Transition of Care Consult (CM Consult): SNF  Partner SNF: No  Reason Why Partner SNF Not Chosen: Bed availability  Current Support Network: Nursing Facility  Confirm Follow Up Transport: Other (see comment)  The Plan for Transition of Care is Related to the Following Treatment Goals : therapy to LTC  The Patient and/or Patient Representative was Provided with a Choice of Provider and Agrees with the Discharge Plan?: Yes  Name of the Patient Representative Who was Provided with a Choice of Provider and Agrees with the Discharge Plan: Julian Artis  Freedom of Choice List was Provided with Basic Dialogue that Supports the Patient's Individualized Plan of Care/Goals, Treatment Preferences and Shares the Quality Data Associated with the Providers?: Yes  Discharge Location  Discharge Placement: Skilled nursing facility([facility])

## 2020-02-23 ENCOUNTER — HOSPITAL ENCOUNTER (INPATIENT)
Age: 60
LOS: 2 days | Discharge: HOME HEALTH | DRG: 640 | End: 2020-02-27
Attending: EMERGENCY MEDICINE | Admitting: HOSPITALIST
Payer: MEDICARE

## 2020-02-23 DIAGNOSIS — R06.82 TACHYPNEA ON EXAMINATION: Primary | ICD-10-CM

## 2020-02-23 DIAGNOSIS — R06.02 SOB (SHORTNESS OF BREATH): ICD-10-CM

## 2020-02-23 DIAGNOSIS — I20.9 ANGINA PECTORIS (HCC): ICD-10-CM

## 2020-02-23 LAB
BASOPHILS # BLD: 0 K/UL (ref 0–0.1)
BASOPHILS NFR BLD: 0 % (ref 0–2)
DIFFERENTIAL METHOD BLD: ABNORMAL
EOSINOPHIL # BLD: 0.2 K/UL (ref 0–0.4)
EOSINOPHIL NFR BLD: 3 % (ref 0–5)
ERYTHROCYTE [DISTWIDTH] IN BLOOD BY AUTOMATED COUNT: 14.8 % (ref 11.6–14.5)
HCT VFR BLD AUTO: 41 % (ref 36–48)
HGB BLD-MCNC: 12.6 G/DL (ref 13–16)
LYMPHOCYTES # BLD: 1 K/UL (ref 0.9–3.6)
LYMPHOCYTES NFR BLD: 14 % (ref 21–52)
MCH RBC QN AUTO: 28.7 PG (ref 24–34)
MCHC RBC AUTO-ENTMCNC: 30.7 G/DL (ref 31–37)
MCV RBC AUTO: 93.4 FL (ref 74–97)
MONOCYTES # BLD: 0.7 K/UL (ref 0.05–1.2)
MONOCYTES NFR BLD: 9 % (ref 3–10)
NEUTS SEG # BLD: 5.4 K/UL (ref 1.8–8)
NEUTS SEG NFR BLD: 74 % (ref 40–73)
PLATELET # BLD AUTO: 189 K/UL (ref 135–420)
PMV BLD AUTO: 10.6 FL (ref 9.2–11.8)
RBC # BLD AUTO: 4.39 M/UL (ref 4.7–5.5)
WBC # BLD AUTO: 7.4 K/UL (ref 4.6–13.2)

## 2020-02-23 PROCEDURE — 85025 COMPLETE CBC W/AUTO DIFF WBC: CPT

## 2020-02-23 PROCEDURE — 83880 ASSAY OF NATRIURETIC PEPTIDE: CPT

## 2020-02-23 PROCEDURE — 84484 ASSAY OF TROPONIN QUANT: CPT

## 2020-02-23 PROCEDURE — 80053 COMPREHEN METABOLIC PANEL: CPT

## 2020-02-23 PROCEDURE — 93005 ELECTROCARDIOGRAM TRACING: CPT

## 2020-02-23 PROCEDURE — 83735 ASSAY OF MAGNESIUM: CPT

## 2020-02-23 PROCEDURE — 99285 EMERGENCY DEPT VISIT HI MDM: CPT

## 2020-02-23 RX ORDER — GUAIFENESIN 100 MG/5ML
162 LIQUID (ML) ORAL
Status: COMPLETED | OUTPATIENT
Start: 2020-02-23 | End: 2020-02-24

## 2020-02-23 RX ORDER — NITROGLYCERIN 0.4 MG/1
0.4 TABLET SUBLINGUAL AS NEEDED
Status: DISCONTINUED | OUTPATIENT
Start: 2020-02-23 | End: 2020-02-27 | Stop reason: HOSPADM

## 2020-02-24 ENCOUNTER — APPOINTMENT (OUTPATIENT)
Dept: GENERAL RADIOLOGY | Age: 60
DRG: 640 | End: 2020-02-24
Attending: EMERGENCY MEDICINE
Payer: MEDICARE

## 2020-02-24 PROBLEM — I50.22 SYSTOLIC CHF, CHRONIC (HCC): Status: ACTIVE | Noted: 2020-02-24

## 2020-02-24 PROBLEM — R07.9 CHEST PAIN: Status: ACTIVE | Noted: 2020-02-24

## 2020-02-24 PROBLEM — Z99.2 ESRD ON HEMODIALYSIS (HCC): Status: ACTIVE | Noted: 2020-02-24

## 2020-02-24 PROBLEM — I25.10 CAD (CORONARY ARTERY DISEASE): Status: ACTIVE | Noted: 2020-02-24

## 2020-02-24 PROBLEM — N18.6 ESRD ON HEMODIALYSIS (HCC): Status: ACTIVE | Noted: 2020-02-24

## 2020-02-24 PROBLEM — Z86.73 HISTORY OF CVA (CEREBROVASCULAR ACCIDENT): Status: ACTIVE | Noted: 2020-02-24

## 2020-02-24 PROBLEM — R77.8 ELEVATED TROPONIN: Status: ACTIVE | Noted: 2020-02-24

## 2020-02-24 LAB
ALBUMIN SERPL-MCNC: 3.8 G/DL (ref 3.4–5)
ALBUMIN/GLOB SERPL: 0.9 {RATIO} (ref 0.8–1.7)
ALP SERPL-CCNC: 119 U/L (ref 45–117)
ALT SERPL-CCNC: 45 U/L (ref 16–61)
ANION GAP SERPL CALC-SCNC: 9 MMOL/L (ref 3–18)
APPEARANCE UR: CLEAR
APTT PPP: 23.4 SEC (ref 23–36.4)
AST SERPL-CCNC: 42 U/L (ref 10–38)
ATRIAL RATE: 102 BPM
ATRIAL RATE: 95 BPM
BACTERIA URNS QL MICRO: NEGATIVE /HPF
BILIRUB SERPL-MCNC: 0.4 MG/DL (ref 0.2–1)
BILIRUB UR QL: NEGATIVE
BNP SERPL-MCNC: ABNORMAL PG/ML (ref 0–900)
BUN SERPL-MCNC: 65 MG/DL (ref 7–18)
BUN/CREAT SERPL: 6 (ref 12–20)
CALCIUM SERPL-MCNC: 8.3 MG/DL (ref 8.5–10.1)
CALCULATED P AXIS, ECG09: 49 DEGREES
CALCULATED P AXIS, ECG09: 59 DEGREES
CALCULATED R AXIS, ECG10: 18 DEGREES
CALCULATED R AXIS, ECG10: 25 DEGREES
CALCULATED T AXIS, ECG11: -164 DEGREES
CALCULATED T AXIS, ECG11: -168 DEGREES
CHLORIDE SERPL-SCNC: 103 MMOL/L (ref 100–111)
CHOLEST SERPL-MCNC: 103 MG/DL
CO2 SERPL-SCNC: 28 MMOL/L (ref 21–32)
COLOR UR: YELLOW
CREAT SERPL-MCNC: 11.7 MG/DL (ref 0.6–1.3)
DIAGNOSIS, 93000: NORMAL
DIAGNOSIS, 93000: NORMAL
EPITH CASTS URNS QL MICRO: NORMAL /LPF (ref 0–5)
EST. AVERAGE GLUCOSE BLD GHB EST-MCNC: NORMAL MG/DL
GLOBULIN SER CALC-MCNC: 4.3 G/DL (ref 2–4)
GLUCOSE BLD STRIP.AUTO-MCNC: 103 MG/DL (ref 70–110)
GLUCOSE BLD STRIP.AUTO-MCNC: 104 MG/DL (ref 70–110)
GLUCOSE BLD STRIP.AUTO-MCNC: 156 MG/DL (ref 70–110)
GLUCOSE BLD STRIP.AUTO-MCNC: 71 MG/DL (ref 70–110)
GLUCOSE SERPL-MCNC: 85 MG/DL (ref 74–99)
GLUCOSE UR STRIP.AUTO-MCNC: 250 MG/DL
HBA1C MFR BLD: 4.2 % (ref 4.2–5.6)
HDLC SERPL-MCNC: 57 MG/DL (ref 40–60)
HDLC SERPL: 1.8 {RATIO} (ref 0–5)
HGB UR QL STRIP: ABNORMAL
KETONES UR QL STRIP.AUTO: NEGATIVE MG/DL
LDLC SERPL CALC-MCNC: 32 MG/DL (ref 0–100)
LEUKOCYTE ESTERASE UR QL STRIP.AUTO: NEGATIVE
LIPID PROFILE,FLP: NORMAL
MAGNESIUM SERPL-MCNC: 2.4 MG/DL (ref 1.6–2.6)
NITRITE UR QL STRIP.AUTO: NEGATIVE
P-R INTERVAL, ECG05: 170 MS
P-R INTERVAL, ECG05: 182 MS
PH UR STRIP: >8.5 [PH] (ref 5–8)
POTASSIUM SERPL-SCNC: 4.3 MMOL/L (ref 3.5–5.5)
PROT SERPL-MCNC: 8.1 G/DL (ref 6.4–8.2)
PROT UR STRIP-MCNC: 300 MG/DL
Q-T INTERVAL, ECG07: 392 MS
Q-T INTERVAL, ECG07: 398 MS
QRS DURATION, ECG06: 126 MS
QRS DURATION, ECG06: 128 MS
QTC CALCULATION (BEZET), ECG08: 500 MS
QTC CALCULATION (BEZET), ECG08: 510 MS
RBC #/AREA URNS HPF: NORMAL /HPF (ref 0–5)
SODIUM SERPL-SCNC: 140 MMOL/L (ref 136–145)
SP GR UR REFRACTOMETRY: 1.01 (ref 1–1.03)
T4 FREE SERPL-MCNC: 1.1 NG/DL (ref 0.7–1.5)
TRIGL SERPL-MCNC: 70 MG/DL (ref ?–150)
TROPONIN I SERPL-MCNC: 0.13 NG/ML (ref 0–0.04)
TROPONIN I SERPL-MCNC: 0.14 NG/ML (ref 0–0.04)
TROPONIN I SERPL-MCNC: 0.17 NG/ML (ref 0–0.04)
UROBILINOGEN UR QL STRIP.AUTO: 0.2 EU/DL (ref 0.2–1)
VENTRICULAR RATE, ECG03: 102 BPM
VENTRICULAR RATE, ECG03: 95 BPM
VLDLC SERPL CALC-MCNC: 14 MG/DL
WBC URNS QL MICRO: NEGATIVE /HPF (ref 0–4)

## 2020-02-24 PROCEDURE — 83036 HEMOGLOBIN GLYCOSYLATED A1C: CPT

## 2020-02-24 PROCEDURE — 99218 HC RM OBSERVATION: CPT

## 2020-02-24 PROCEDURE — 85730 THROMBOPLASTIN TIME PARTIAL: CPT

## 2020-02-24 PROCEDURE — 84439 ASSAY OF FREE THYROXINE: CPT

## 2020-02-24 PROCEDURE — 36415 COLL VENOUS BLD VENIPUNCTURE: CPT

## 2020-02-24 PROCEDURE — 90935 HEMODIALYSIS ONE EVALUATION: CPT

## 2020-02-24 PROCEDURE — 71045 X-RAY EXAM CHEST 1 VIEW: CPT

## 2020-02-24 PROCEDURE — 84484 ASSAY OF TROPONIN QUANT: CPT

## 2020-02-24 PROCEDURE — 77010033678 HC OXYGEN DAILY

## 2020-02-24 PROCEDURE — 81001 URINALYSIS AUTO W/SCOPE: CPT

## 2020-02-24 PROCEDURE — 74011250637 HC RX REV CODE- 250/637: Performed by: EMERGENCY MEDICINE

## 2020-02-24 PROCEDURE — 74011250637 HC RX REV CODE- 250/637: Performed by: INTERNAL MEDICINE

## 2020-02-24 PROCEDURE — 74011636637 HC RX REV CODE- 636/637: Performed by: INTERNAL MEDICINE

## 2020-02-24 PROCEDURE — 82962 GLUCOSE BLOOD TEST: CPT

## 2020-02-24 PROCEDURE — 80061 LIPID PANEL: CPT

## 2020-02-24 PROCEDURE — 94762 N-INVAS EAR/PLS OXIMTRY CONT: CPT

## 2020-02-24 PROCEDURE — 5A1D70Z PERFORMANCE OF URINARY FILTRATION, INTERMITTENT, LESS THAN 6 HOURS PER DAY: ICD-10-PCS | Performed by: INTERNAL MEDICINE

## 2020-02-24 PROCEDURE — 93005 ELECTROCARDIOGRAM TRACING: CPT

## 2020-02-24 RX ORDER — LISINOPRIL 5 MG/1
2.5 TABLET ORAL DAILY
Status: DISCONTINUED | OUTPATIENT
Start: 2020-02-25 | End: 2020-02-27 | Stop reason: HOSPADM

## 2020-02-24 RX ORDER — OXYCODONE AND ACETAMINOPHEN 5; 325 MG/1; MG/1
1 TABLET ORAL
Status: DISCONTINUED | OUTPATIENT
Start: 2020-02-24 | End: 2020-02-27 | Stop reason: HOSPADM

## 2020-02-24 RX ORDER — METOPROLOL SUCCINATE 25 MG/1
25 TABLET, EXTENDED RELEASE ORAL DAILY
Status: DISCONTINUED | OUTPATIENT
Start: 2020-02-25 | End: 2020-02-24 | Stop reason: SDUPTHER

## 2020-02-24 RX ORDER — ATORVASTATIN CALCIUM 80 MG/1
80 TABLET, FILM COATED ORAL DAILY
Status: DISCONTINUED | OUTPATIENT
Start: 2020-02-25 | End: 2020-02-27 | Stop reason: HOSPADM

## 2020-02-24 RX ORDER — ONDANSETRON 2 MG/ML
4 INJECTION INTRAMUSCULAR; INTRAVENOUS
Status: DISCONTINUED | OUTPATIENT
Start: 2020-02-24 | End: 2020-02-27 | Stop reason: HOSPADM

## 2020-02-24 RX ORDER — DOCUSATE SODIUM 100 MG/1
100 CAPSULE, LIQUID FILLED ORAL
Status: DISCONTINUED | OUTPATIENT
Start: 2020-02-24 | End: 2020-02-27 | Stop reason: HOSPADM

## 2020-02-24 RX ORDER — METOPROLOL SUCCINATE 25 MG/1
25 TABLET, EXTENDED RELEASE ORAL 2 TIMES DAILY
Status: DISCONTINUED | OUTPATIENT
Start: 2020-02-24 | End: 2020-02-27 | Stop reason: HOSPADM

## 2020-02-24 RX ORDER — IPRATROPIUM BROMIDE AND ALBUTEROL SULFATE 2.5; .5 MG/3ML; MG/3ML
3 SOLUTION RESPIRATORY (INHALATION)
Status: DISCONTINUED | OUTPATIENT
Start: 2020-02-24 | End: 2020-02-27 | Stop reason: HOSPADM

## 2020-02-24 RX ORDER — SEVELAMER CARBONATE 800 MG/1
800 TABLET, FILM COATED ORAL
Status: DISCONTINUED | OUTPATIENT
Start: 2020-02-24 | End: 2020-02-27 | Stop reason: HOSPADM

## 2020-02-24 RX ORDER — ACETAMINOPHEN 325 MG/1
650 TABLET ORAL
Status: DISCONTINUED | OUTPATIENT
Start: 2020-02-24 | End: 2020-02-27 | Stop reason: HOSPADM

## 2020-02-24 RX ORDER — PANTOPRAZOLE SODIUM 40 MG/10ML
40 INJECTION, POWDER, LYOPHILIZED, FOR SOLUTION INTRAVENOUS
Status: ACTIVE | OUTPATIENT
Start: 2020-02-24 | End: 2020-02-25

## 2020-02-24 RX ORDER — GUAIFENESIN 100 MG/5ML
81 LIQUID (ML) ORAL DAILY
Status: DISCONTINUED | OUTPATIENT
Start: 2020-02-24 | End: 2020-02-27 | Stop reason: HOSPADM

## 2020-02-24 RX ORDER — LANOLIN ALCOHOL/MO/W.PET/CERES
12 CREAM (GRAM) TOPICAL
Status: DISCONTINUED | OUTPATIENT
Start: 2020-02-24 | End: 2020-02-27 | Stop reason: HOSPADM

## 2020-02-24 RX ORDER — HEPARIN SODIUM 5000 [USP'U]/ML
1000 INJECTION, SOLUTION INTRAVENOUS; SUBCUTANEOUS
Status: ACTIVE | OUTPATIENT
Start: 2020-02-24 | End: 2020-02-24

## 2020-02-24 RX ORDER — MAGNESIUM SULFATE 100 %
4 CRYSTALS MISCELLANEOUS AS NEEDED
Status: DISCONTINUED | OUTPATIENT
Start: 2020-02-24 | End: 2020-02-27 | Stop reason: HOSPADM

## 2020-02-24 RX ORDER — AMIODARONE HYDROCHLORIDE 200 MG/1
200 TABLET ORAL DAILY
Status: DISCONTINUED | OUTPATIENT
Start: 2020-02-25 | End: 2020-02-27 | Stop reason: HOSPADM

## 2020-02-24 RX ORDER — INSULIN LISPRO 100 [IU]/ML
INJECTION, SOLUTION INTRAVENOUS; SUBCUTANEOUS EVERY 6 HOURS
Status: DISCONTINUED | OUTPATIENT
Start: 2020-02-24 | End: 2020-02-24

## 2020-02-24 RX ORDER — INSULIN LISPRO 100 [IU]/ML
INJECTION, SOLUTION INTRAVENOUS; SUBCUTANEOUS
Status: DISCONTINUED | OUTPATIENT
Start: 2020-02-24 | End: 2020-02-27 | Stop reason: HOSPADM

## 2020-02-24 RX ADMIN — INSULIN LISPRO 2 UNITS: 100 INJECTION, SOLUTION INTRAVENOUS; SUBCUTANEOUS at 17:16

## 2020-02-24 RX ADMIN — SEVELAMER CARBONATE 800 MG: 800 TABLET, FILM COATED ORAL at 17:16

## 2020-02-24 RX ADMIN — ASPIRIN 81 MG CHEWABLE TABLET 81 MG: 81 TABLET CHEWABLE at 08:24

## 2020-02-24 RX ADMIN — METOPROLOL SUCCINATE 25 MG: 25 TABLET, EXTENDED RELEASE ORAL at 17:52

## 2020-02-24 RX ADMIN — ASPIRIN 81 MG CHEWABLE TABLET 162 MG: 81 TABLET CHEWABLE at 00:33

## 2020-02-24 RX ADMIN — NITROGLYCERIN 0.5 INCH: 20 OINTMENT TOPICAL at 03:10

## 2020-02-24 NOTE — ED NOTES
Patient transported to floor. Vitals stable. No acute signs of distress. Dressing dry and in place to Left upper arm over dialysis site.

## 2020-02-24 NOTE — CONSULTS
Consult Note    Assessment:     1. ESRD. Hypervolemic. Stable electrolytes. 2. HTN, stable. 3. CAD with HFrEF/volume overload. 4. Anemia of ESRD. H/H is above goal.   5. Secondary hyperparathyroidism of ESRD. 6. PAD, s/p rt bka with lt foot ischemia. Follows with smooth vascular. Recommendations:   1. Hemodialysis today and continue mwf. 3-4 liters uf today. 2. Monitor bp with dialysis, volume removal. Resume op antihtn. 3. No neef for epo at this time. 4. Continue renvela. 5. Avoid Gadolinium due to its association with nephrogenic systemic fibrosis in a patients with severe ARF and ESRD. 6. Please dose all medications for  creatinine clearance <15/dialysis. 7. Protect  left  arm from blood pressure checks, blood draws, peripheral iv's. Avoid PICC lines on either arm in order to preserve veins for dialysis access creation. Consult requested by: Vesta Renee MD    ADMIT DATE: 2/23/2020  CONSULT DATE: February 24, 2020                 Admission diagnosis: Chest pain   Reason for Nephrology Consultation: Management of ESRD  HPI: Kushal Cook is a 61 y.o. male  with h/o of dm, htn, cad, s/p cabg, pad, s/p r rt foot bka and known diagnosis of ESRD for which he receives hemodialysis on MWF  schedule at [facility] outpatient unit in [facility] under my care. Patient dialyzes for 4 hours. Lt arm avf is used for dialysis access. Last dialysis at outpatient unit was on 2/21 and was uneventful. Patient presented to [facility] ED with worsening sob. H/o is difficult to obtains due to language barrier and baseline dementia. In ED patient was hypertensive, tachycardic and hypoxic. Troponins are borderline. Patient is getting admitted. Meanwhile he was taken to acute dialysis unit where he was seen while on dialysis.        Past Medical History:   Diagnosis Date    Below-knee amputation of right lower extremity ([facility] 01/2020    CAD (coronary artery disease)     S/P CABG    Chronic pain     back    [facility], [facility]     ESRD (end stage renal disease) on dialysis ([facility]     LUE Fistula; MATTHEW,W,F [facility] in [facility].  Dr. Cosme Berg    GI bleed 01/2020    Hypercholesterolemia     Hypertension     Pulmonary hypertension ([facility] 11/02/2020    Mild (38.8 mm Hg) by TTE on 11/02/2020    Stroke ([facility]     Systolic CHF ([facility]     LVEF 20-25% by TTE on 11/02/2020    Vitamin D deficiency       Past Surgical History:   Procedure Laterality Date    CARDIAC SURG PROCEDURE UNLIST      angioplasty    HX BELOW KNEE AMPUTATION Right 01/2020    HX CORONARY ARTERY BYPASS GRAFT  2019    HX ENDOSCOPY  03/2008    EGD    HX VASCULAR ACCESS      LUE AV Fistula       Social History     Socioeconomic History    Marital status: SINGLE     Spouse name: Not on file    Number of children: Not on file    Years of education: Not on file    Highest education level: Not on file   Occupational History    Not on file   Social Needs    Financial resource strain: Not on file    Food insecurity:     Worry: Not on file     Inability: Not on file    Transportation needs:     Medical: Not on file     Non-medical: Not on file   Tobacco Use    Smoking status: Current Every Day Smoker     Packs/day: 1.00     Years: 39.00     Pack years: 39.00    Smokeless tobacco: Never Used    Tobacco comment: Patient denies ever Smoking despite recorded history of 1 PPD x39 years   Substance and Sexual Activity    Alcohol use: No    Drug use: No    Sexual activity: Not on file   Lifestyle    Physical activity:     Days per week: Not on file     Minutes per session: Not on file    Stress: Not on file   Relationships    Social connections:     Talks on phone: Not on file     Gets together: Not on file     Attends Restorationism service: Not on file     Active member of club or organization: Not on file     Attends meetings of clubs or organizations: Not on file     Relationship status: Not on file    Intimate partner violence:     Fear of current or ex partner: Not on file     Emotionally abused: Not on file     Physically abused: Not on file     Forced sexual activity: Not on file   Other Topics Concern     Service Not Asked    Blood Transfusions Not Asked    Caffeine Concern Not Asked    Occupational Exposure Not Asked    Hobby Hazards Not Asked    Sleep Concern Not Asked    Stress Concern Not Asked    Weight Concern Not Asked    Special Diet Not Asked    Back Care Not Asked    Exercise Not Asked    Bike Helmet Not Asked   [address] Not Asked    Self-Exams Not Asked   Social History Narrative    Not on file       Family History   Problem Relation Age of Onset    Heart Disease Mother     Hypertension Sister     Hypertension Brother      No Known Allergies     Home Medications:   (Not in a hospital admission)      Current Inpatient Medications:     Current Facility-Administered Medications   Medication Dose Route Frequency    ondansetron (ZOFRAN) injection 4 mg  4 mg IntraVENous Q4H PRN    insulin lispro (HUMALOG) injection   SubCUTAneous Q6H    glucose chewable tablet 16 g  4 Tab Oral PRN    glucagon (GLUCAGEN) injection 1 mg  1 mg IntraMUSCular PRN    dextrose 10 % infusion 125-250 mL  125-250 mL IntraVENous PRN    acetaminophen (TYLENOL) tablet 650 mg  650 mg Oral Q6H PRN    aspirin chewable tablet 81 mg  81 mg Oral DAILY    albuterol-ipratropium (DUO-NEB) 2.5 MG-0.5 MG/3 ML  3 mL Nebulization Q4H PRN    melatonin tablet 12 mg  12 mg Oral QHS PRN    pantoprazole (PROTONIX) injection 40 mg  40 mg IntraVENous ONCE PRN    docusate sodium (COLACE) capsule 100 mg  100 mg Oral BID PRN    oxyCODONE-acetaminophen (PERCOCET) 5-325 mg per tablet 1 Tab  1 Tab Oral Q6H PRN    nitroglycerin (NITROBID) 2 % ointment 0.5 Inch  0.5 Inch Topical Q6H PRN    heparin (porcine) injection 1,000 Units  1,000 Units IntraVENous Q1H PRN    nitroglycerin (NITROSTAT) tablet 0.4 mg  0.4 mg SubLINGual PRN Current Outpatient Medications   Medication Sig    atorvastatin (LIPITOR) 80 mg tablet Take 80 mg by mouth daily.  metoprolol succinate (TOPROL-XL) 25 mg XL tablet Take 25 mg by mouth two (2) times a day.  lisinopril (PRINIVIL, ZESTRIL) 2.5 mg tablet Take 2.5 mg by mouth daily.  chlorproMAZINE (THORAZINE) 10 mg tablet Take 10 mg by mouth three (3) times daily.  glimepiride (AMARYL) 1 mg tablet Take 1 mg by mouth Daily (before breakfast).  nitroglycerin (NITROSTAT) 0.4 mg SL tablet by SubLINGual route every five (5) minutes as needed for Chest Pain.  sevelamer carbonate (RENVELA) 800 mg tab tab Take 4 tablets by mouth three times daily with meals and 4 tablets by mouth twice daily with snacks    cholecalciferol, vitamin D3, (VITAMIN D3) 2,000 unit tab Take  by mouth daily.  amiodarone (CORDARONE) 200 mg tablet Take 200 mg by mouth daily.  fenofibrate nanocrystallized (TRICOR) 145 mg tablet Take 145 mg by mouth daily. Review of Systems:   No fever or chills. No sore throat. No cough or hemoptysis. No  chest pain. No orthopnea or paroxysmal nocturnal dyspnea. Food appetite. No nausea, vomiting, abdominal pain, melena or hematochezia. No constipation or diarrhea. Makes little urine. No ankle swelling, no joint paints. No muscle aches. Physical Assessment:     Vitals:    02/24/20 0800 02/24/20 0815 02/24/20 0828 02/24/20 0930   BP: 181/84 141/88  (!) 165/97   Pulse: (!) 101 (!) 102  (!) 104   Resp: 19 24 23   Temp:       SpO2: 96% 95% 98% 93%     There were no vitals filed for this visit. Admission weight:      No intake or output data in the 24 hours ending 02/24/20 1050    Patient is in no apparent distress. HEENT: Head is normocephalic and atraumatic. Pupils are round, equal, reactive to light. Sclerae are anicteric. Oropharynx clear. Neck: no cervical lymphadenopathy or thyromegaly. Lungs: good air entry, bl exp rhonchi and bibasilar crackles. Trachea at the midline. Cardiovascular system: S1, S2, regular rate and rhythm. Soft syst murmur. No jvd. Carotid upstroke 2 + bilaterally. Abdomen: soft, non tender, non distended. Positive bowel sounds. No hepatosplenomegaly. No abdominal bruits. Extremities: no clubbing, cyanosis or edema. Rt bka stump with area of large eschar, no drainage. Lt 1st, 4,5 toes with tip ulcerations, lt 5 mtp area with ulcer, no drainage. Neurologic: Alert, oriented. No gross motor or sensory deficits. Dialysis access: Lt arm avf, cannulated. Data Review:    Labs: Results:       Chemistry Recent Labs     02/23/20  2345   GLU 85      K 4.3      CO2 28   BUN 65*   CREA 11.70*   CA 8.3*   AGAP 9   BUCR 6*   *   TP 8.1   ALB 3.8   GLOB 4.3*   AGRAT 0.9         CBC w/Diff Recent Labs     02/23/20  2345   WBC 7.4   RBC 4.39*   HGB 12.6*   HCT 41.0      GRANS 74*   LYMPH 14*   EOS 3         Iron/Ferritin No results for input(s): IRON in the last 72 hours. No lab exists for component: TIBCCALC   PTH/VIT D No results for input(s): PTH in the last 72 hours.     No lab exists for component: VITD            Jock Councilman, M.D  [facility]  Office [phone]  Pager [phone]    February 24, 2020

## 2020-02-24 NOTE — H&P
History and Physical    Patient: Sim Wakefield MRN: [id number]  SSN: [id number]    YOB: 1960  Age: 61 y.o. Sex: male      Subjective:      Sim Wakefield is a 61 y.o. male who presents to [facility] ER with complaint of Shortness of Breath and Chest Pain. Patient reports chest pain that was sternal and has been lessening as time goes on and with the administration of nitroglycerin to the point of temporarily resolving. Patient's Son reports that Patient was complaining of Shortness of Breath for approximately 1 day. Patient denies Smoking, despite history in EHR recorded as 1 PPD x39 years. Patient also denies being on Thorazine. Patient endorses orthopnea. Patient states last day of Dialysis was on Friday. Contradictory to information received by Nursing Staff, Patient denies being on Oxygen at home to this interviewer. In [facility] ER, Patient was noted to have an Elevated Troponin of 0.13. EKG showed tachycardia with Poor QRS Progression and T-wave Inversions of V5-V6 with QTc Prolongation of 510 ms. Notably, Patient appears to have variable, chronically elevated Troponins. Patient is placed on Observation on [facility] Telemetry Unit for Chest Pain with Elevated Troponin with concern for ACS. Past Medical History:   Diagnosis Date    Below-knee amputation of right lower extremity ([address] 01/2020    CAD (coronary artery disease)     S/P CABG    Chronic pain     back    Diabetes mellitus, type II ([address]     ESRD (end stage renal disease) on dialysis ([address]     LUE Fistula; M,W,[facility] in [facility].  Dr. Murrell Mis    GI bleed 01/2020    Hypercholesterolemia     Hypertension     Pulmonary hypertension ([address] 11/02/2020    Mild (38.8 mm Hg) by TTE on 11/02/2020    Stroke ([address]     Systolic CHF ([address]     LVEF 20-25% by TTE on 11/02/2020    Vitamin D deficiency      Past Surgical History:   Procedure Laterality Date    CARDIAC SURG PROCEDURE UNLIST      angioplasty    HX BELOW KNEE AMPUTATION Right 01/2020    HX CORONARY ARTERY BYPASS GRAFT  2019    HX ENDOSCOPY  03/2008    EGD    HX VASCULAR ACCESS      LUE AV Fistula      Family History   Problem Relation Age of Onset    Heart Disease Mother     Hypertension Sister     Hypertension Brother      Social History     Tobacco Use    Smoking status: Current Every Day Smoker     Packs/day: 1.00     Years: 39.00     Pack years: 39.00    Smokeless tobacco: Never Used    Tobacco comment: Patient denies ever Smoking despite recorded history of 1 PPD x39 years   Substance Use Topics    Alcohol use: No      Patient lives at home with his Son. Patient denies Smoking, despite a record in two EHRs showing Smoking (one detailing it as 1 PPD x39 years). Patient denies ETOH consumption and Illicit Drug Use. Prior to Admission medications    Medication Sig Start Date End Date Taking? Authorizing Provider   atorvastatin (LIPITOR) 80 mg tablet Take 80 mg by mouth daily. Provider, Historical   metoprolol succinate (TOPROL-XL) 25 mg XL tablet Take 25 mg by mouth two (2) times a day. Provider, Historical   lisinopril (PRINIVIL, ZESTRIL) 2.5 mg tablet Take 2.5 mg by mouth daily. Provider, Historical   chlorproMAZINE (THORAZINE) 10 mg tablet Take 10 mg by mouth three (3) times daily. Provider, Historical   glimepiride (AMARYL) 1 mg tablet Take 1 mg by mouth Daily (before breakfast). Provider, Historical   nitroglycerin (NITROSTAT) 0.4 mg SL tablet by SubLINGual route every five (5) minutes as needed for Chest Pain. Provider, Historical   sevelamer carbonate (RENVELA) 800 mg tab tab Take 4 tablets by mouth three times daily with meals and 4 tablets by mouth twice daily with snacks    Provider, Historical   cholecalciferol, vitamin D3, (VITAMIN D3) 2,000 unit tab Take  by mouth daily. Provider, Historical   amiodarone (CORDARONE) 200 mg tablet Take 200 mg by mouth daily.     Provider, Historical   fenofibrate nanocrystallized (TRICOR) 145 mg tablet Take 145 mg by mouth daily. Provider, Historical        No Known Allergies    Review of Systems:  Limited ROS, as English is not Patient's First Language and no  was available for this Patient's primary language:  (+) Shortness of Breath  (+) Orthopnea  (+) LLE Edema  (+) Chest Pain  (-) Wheezing  All other systems have been reviewed and are negative      Objective:     Vitals:    02/24/20 0345 02/24/20 0400 02/24/20 0415 02/24/20 0430   BP: 164/90 180/85 157/74 151/61   Pulse: 97 (!) 101 100 100   Resp: 19 24 21 22   Temp:       SpO2: 99% 98% 97% 96%        Physical Exam:  General:  Older Adult male sitting up in bed in no acute distress  HEENT:  Atraumatic, normocephalic; Pupils equally round and reactive to light with accommodation; Extraocular muscles intact; Moist Oropharynx without erythema, edema, or exudates  Neck:  No Bruits; No Lymphadenopathy  Chest:  No pectus carinatum; No pectus excavatum; (+) Cardiac device in left chest  Cardiovascular:  (+) Tachycardic rate, regular rhythm without rubs, gallops, or murmurs  Respiratory:  (+) Mild Crackles over Bilateral Mid-to-Lower lung-fields; no wheezes or rhonchi; (+) Minimally increased effort of breathing  Abdominal:  Soft, non-tense, non-tender abdomen; BS present without guarding, rebound, or masses  :  Deferred  Extremities:  Pulses 2+ x3 (2+ x1 of Right Popliteal fossa) without pitting edema, clubbing, or cyanosis  Musculoskeletal:  Strength 5/5 and symmetrical in BUE and LLE  Integument:  No rash on face, forearms, or legs  Neurological:  Alert and Oriented; No gross deficits of Visual Acuity, Eye Movement, Jaw Opening, Facial Expression, Hearing, Phonation, or Head Movement;  No gross deficits of Tongue Movement or Slurring of Speech  Psychiatric:  (+) Affect is Minimally Blunted; Language is present and fluent, but (+) very limited and sparse; (+) Behavior is somewhat Distant      I have personally reviewed the CXR and have found, per my read, sternotomy wire present, low left lateral cardiac device, and what appear to be three stents in left brachiocephalic artery. Assessment:     Hospital Problems  Date Reviewed: 2/24/2020          Codes Class Noted POA    * (Principal) Chest pain ICD-10-CM: R07.9  ICD-9-CM: 786.50  2/24/2020 Yes        Elevated troponin ICD-10-CM: R79.89  ICD-9-CM: 790.6  2/24/2020 Yes        CAD (coronary artery disease) ICD-10-CM: I25.10  ICD-9-CM: 414.00  2/24/2020 Yes        ESRD on hemodialysis [facility]) ICD-10-CM: N18.6, Z99.2  ICD-9-CM: 585.6, V45.11  2/24/2020 Yes        History of CVA (cerebrovascular accident) ICD-10-CM: Z86.73  ICD-9-CM: V12.54  2/24/2020 Yes        Systolic CHF, chronic (HCC) ICD-10-CM: I50.22  ICD-9-CM: 428.22, 428.0  2/24/2020 Yes        HTN (hypertension) ICD-10-CM: I10  ICD-9-CM: 401.9  1/1/2020 Yes        HLD (hyperlipidemia) ICD-10-CM: E78.5  ICD-9-CM: 272.4  1/1/2020 Yes        Type II diabetes mellitus ([facility] ICD-10-CM: E11.9  ICD-9-CM: 250.00  1/1/2020 Yes        GI bleed ICD-10-CM: K92.2  ICD-9-CM: 578.9  1/1/2020 Yes              Plan:     Telemetry, ASA, Metoprolol succinate, Lisinopril, and Atorvastatin, order Serial Troponins, Serial EKG, check HbA1c, Lipid Panel, and FT4. Consider starting Heparin gtt and consulting Cardiology if Troponins increase significantly. Continue home medications for ASCVD/CAD (as above) and ESRD. Continue Amiodarone. HOLD Chlorpromazine due to QTc Prolongation >500 ms. HOLD oral Diabetes medications. Consult Nephrology for Dialysis M,W,F. Check POC qACHS with Corrective Insulin. Check HbA1c (as above). DVT mechanoprophylaxis.     Signed By: Claire Lind, DO     February 24, 2020

## 2020-02-24 NOTE — ED NOTES
Pt's troponin is up from 0.13 to 0.14. He is resting at this time and seems to have gotten some relief from nitro paste.

## 2020-02-24 NOTE — ED TRIAGE NOTES
Pt is a dialysis pt, mwalyssa. Last treatment was Friday. Complaining of sob all day today, wears o2 my nasal canula at home. States cp started an hour ago.   Mid sternal pain that is lessening in intensity

## 2020-02-24 NOTE — ED NOTES
TRANSFER - ED to INPATIENT REPORT:    Verbal report given to [facility], RN(name) on Jyoti Mora  being transferred to [facility](unit) for routine progression of care       Report consisted of patients Situation, Background, Assessment and   Recommendations(SBAR). SBAR report made available to receiving floor on this patient being transferred to [address] [place] [address]  for routine progression of care       Admitting diagnosis Chest pain [R07.9]  Elevated troponin [R79.89]    Information from the following report(s) SBAR was made available to receiving floor. Lines:   Peripheral IV 02/23/20 Right;Upper Arm (Active)   Site Assessment Clean, dry, & intact 2/23/2020 11:43 PM   Phlebitis Assessment 0 2/23/2020 11:43 PM   Infiltration Assessment 0 2/23/2020 11:43 PM   Dressing Status Clean, dry, & intact 2/23/2020 11:43 PM   Dressing Type Transparent 2/23/2020 11:43 PM   Hub Color/Line Status Patent; Flushed;Pink 2/23/2020 11:43 PM   Action Taken Blood drawn 2/23/2020 11:43 PM        HCG Status for ALL Females under 55 y/o: NO n/a     Medication list unable to confirm    Opportunity for questions and clarification was provided.       Patient is only aware of  place, person and situation   Patient is  continent and non-ambulatory     Valuables transported with patient     Patient transported with:   3L NC    MAP (Monitor): 110 =Monitored (most recent)  Vitals w/ MEWS Score (last day)     Date/Time MEWS Score Pulse Resp Temp BP Level of Consciousness SpO2    02/24/20 1445    99  25        97 %    02/24/20 1330    93  20    (!) 155/98        02/24/20 1315    82  20    131/85        02/24/20 1300    82  20    141/83        02/24/20 1245    91  20    (!) 163/97        02/24/20 1230    (!) 47  20    (!) 191/132        02/24/20 1215    91  20    (!) 169/118        02/24/20 1200    94  20    (!) 185/105        02/24/20 1145    84  20    (!) 151/104        02/24/20 1130    91  20    (!) 164/110       02/24/20 1115    89  20    (!) 160/112        02/24/20 1100    95  20    (!) 159/100        02/24/20 1045    95  20    (!) 171/104        02/24/20 1030    (!) 103  20    (!) 182/107        02/24/20 1020    (!) 103  20    (!) 160/98        02/24/20 0930    (!) 104  23    (!) 165/97    93 %    02/24/20 08:28:58              98 %    02/24/20 0815    (!) 102  24    141/88    95 %    02/24/20 0800    (!) 101  19    181/84    96 %    02/24/20 0615  2  97  25  98.3 °F (36.8 °C)  172/89  Alert  92 %    02/24/20 0530    100  17    (!) 163/96    100 %    02/24/20 0515    (!) 106  23    181/83    93 %    02/24/20 0500    98  20    160/90    96 %    02/24/20 0430    100  22    151/61    96 %    02/24/20 0415    100  21    157/74    97 %    02/24/20 0400    (!) 101  24    180/85    98 %    02/24/20 0345    97  19    164/90    99 %    02/24/20 0330    98  20    172/81    97 %    02/24/20 0315    99  20    185/87    97 %    02/24/20 0300    (!) 103  18    145/84    96 %    02/24/20 0245    (!) 113  22    (!) 161/92    94 %    02/24/20 0230    (!) 110  (!) 35    (!) 152/96    93 %    02/24/20 0215    (!) 112  25    (!) 162/94    93 %    02/24/20 0200    95  21    150/54    96 %    02/24/20 0145    (!) 105  29    164/86    96 %    02/24/20 0130    (!) 107  24    (!) 165/100    96 %    02/24/20 0100    (!) 104  28    (!) 163/107    96 %    02/24/20 0030    (!) 101  20    152/89    98 %    02/23/20 2345    (!) 101  23    (!) 136/115    97 %    02/23/20 2339  3  (!) 104  22  98.3 °F (36.8 °C)  (!) 157/100  Alert  99 %              Septic Patients:     Lactic Acid  No results found for: LACPOC (Most recent on top)  Repeat drawn: NO Time: n/a     ALL LACTIC ACIDS GREATER THAN 2 MUST BE REPEATED POC WITHIN 4 HOURS OR PRIOR TO ADMISSION               Total Fluid Bolus initiated and documented on MAR: NO    All ordered antibiotics initiated within first 3 hours of TIME ZERO?   NO

## 2020-02-24 NOTE — PROGRESS NOTES
1528 -- Report from Marley Ross mAy 150 given to Madhu, [facility].    8262 -- Patient on the unit, vitals taken, cardiac monitor applied, no pain noted, skin assessment completed with Pao Rodrigues CNA, call bell and and personal belongings in reach. 1716 -- meds given, well tolerated. Bedside shift change report given to COSME Wheeler (oncoming nurse) by COSME Leung (offgoing nurse). Report included the following information SBAR, Kardex, Intake/Output, MAR and Recent Results.

## 2020-02-24 NOTE — PROGRESS NOTES
Problem: Discharge Planning  Goal: *Discharge to safe environment  Outcome: Progressing Towards Goal  Plan is Home  Reason for Readmission:  Chest pain, elevated troponin            RUR Score/Risk Level:  High Risk       Is a Care Conference indicated:       Did you attend your follow up appointment (s): If not, why not:         Resources/supports as identified by patient/family:   Medicare A&B, Medicaid, 2 sons live locally and a nephew. Pt has [facility] to provide caregivers but unable to staff. Family is trying to get another agency or transfer pt to long term care facility       Top Challenges facing patient (as identified by patient/family and CM): Finances/Medication cost?   Has [employer] transport, family      Support system or lack thereof? Living arrangements? Son lives with pt          Self-care/ADLs/Cognition? Needs assist           Current Advanced Directive/Advance Care Plan:  Not on file           Plan for utilizing home health: Yes               Transition of Care Plan:    Based on readmission, the patient's previous Plan of Care   has been evaluated and/or modified. The current Transition of Care Plan is:   Home with Home Health. Interviewed pt. Verified demographics and updated. Lives with son. Pt is right BKA and wheelchair bound. He needs assist with ADLs. He states that he's  but wife lives in [facility]. Spoke with nephew, Jasbir Marie and states that pt is to have caregivers everyday and [facility] is not able to staff. Spoke with [facility] and states they are waiting for family to tell they want to transfer services. He is a dialysis pt, MWF with chair time of 0600 at [facility]. Care Management Interventions  PCP Verified by CM: Yes(Dr Peterson last seen a year ago)  Palliative Care Criteria Met (RRAT>21 & CHF Dx)?: No  Mode of Transport at Discharge:  Other (see comment)(medicaid transport)  Transition of Care Consult (CM Consult): Discharge Planning  Physical Therapy Consult: No  Occupational Therapy Consult: No  Speech Therapy Consult: No  Current Support Network: Other(Lives with son)  Confirm Follow Up Transport: Family  The Patient and/or Patient Representative was Provided with a Choice of Provider and Agrees with the Discharge Plan?: No  Freedom of Choice List was Provided with Basic Dialogue that Supports the Patient's Individualized Plan of Care/Goals, Treatment Preferences and Shares the Quality Data Associated with the Providers?: No  [facility] Information Provided?: No(pt never served in [employer])  Discharge Location  Discharge Placement: Home with home health      Patient has designated ____nephew_____ to participate in his/her discharge plan and to receive any needed information. Name: Audra Donis  Address:  Phone number: [phone]    Patient and/or next of kin has been given and has signed the [facility] Outpatient Observation  Notification letter and all questions answered. Copy of this notice given to patient and copy placed on chart. Patient and/or next of kin has been given the Outpatient Observation Information and Notification letter and all questions answered. Spoke with pt's nephew and provided with 4 other agencies that provide caregivers.

## 2020-02-24 NOTE — ED PROVIDER NOTES
HPI     63-year-old male with extensive past medical history presents by EMS for reported shortness of breath and chest pain for the past 1 hour. History is limited due to the lack of an  for his native language. If you over the phone states the patient has been complaining of shortness of breath today. He did not tell them that he was having chest pain. He has no history of recent illness. On chart review it appears his last admission was for ACS and GI bleed. Today patient denies any melanotic stools or rectal bleeding. He says his only complaints are chest pain and shortness of breath which or more severe at onset and now mild. Patient is currently living at home with his son. Was last dialyzed on Friday and due for dialysis again tomorrow morning. Past Medical History:   Diagnosis Date    CAD (coronary artery disease)     Chronic pain     back    [facility] [address]     ESRD (end stage renal disease) on dialysis ([facility] [address]     alisia Florence in [facility].  Dr. Althea Waggoner    Hypercholesterolemia     Hypertension     Stroke [facility])        Past Surgical History:   Procedure Laterality Date    CARDIAC SURG PROCEDURE UNLIST      angioplasty    HX GI  3-2008    EGD Normal    HX VASCULAR ACCESS      AV fistula         Family History:   Problem Relation Age of Onset    Heart Disease Mother     Hypertension Sister     Hypertension Brother        Social History     Socioeconomic History    Marital status: SINGLE     Spouse name: Not on file    Number of children: Not on file    Years of education: Not on file    Highest education level: Not on file   Occupational History    Not on file   Social Needs    Financial resource strain: Not on file    Food insecurity:     Worry: Not on file     Inability: Not on file    Transportation needs:     Medical: Not on file     Non-medical: Not on file   Tobacco Use    Smoking status: Current Every Day Smoker     Packs/day: 1.00     Years: 39.00 Pack years: 39.00    Smokeless tobacco: Never Used   Substance and Sexual Activity    Alcohol use: No    Drug use: No    Sexual activity: Not on file   Lifestyle    Physical activity:     Days per week: Not on file     Minutes per session: Not on file    Stress: Not on file   Relationships    Social connections:     Talks on phone: Not on file     Gets together: Not on file     Attends Restorationist service: Not on file     Active member of club or organization: Not on file     Attends meetings of clubs or organizations: Not on file     Relationship status: Not on file    Intimate partner violence:     Fear of current or ex partner: Not on file     Emotionally abused: Not on file     Physically abused: Not on file     Forced sexual activity: Not on file   Other Topics Concern    Not on file   Social History Narrative    Not on file         ALLERGIES: Patient has no known allergies. Review of Systems   Constitutional: Negative for chills and fever. HENT: Negative for ear pain and sore throat. Eyes: Negative for pain and visual disturbance. Respiratory: Positive for shortness of breath. Negative for cough. Cardiovascular: Positive for chest pain. Negative for palpitations. Gastrointestinal: Negative for abdominal pain, diarrhea, nausea and vomiting. Genitourinary: Negative for flank pain. Musculoskeletal: Negative for back pain and neck pain. Neurological: Negative for syncope and headaches. Psychiatric/Behavioral: Negative for agitation. The patient is not nervous/anxious. Vitals:    02/24/20 0345 02/24/20 0400 02/24/20 0415 02/24/20 0430   BP: 164/90 180/85 157/74 151/61   Pulse: 97 (!) 101 100 100   Resp: 19 24 21 22   Temp:       SpO2: 99% 98% 97% 96%            Physical Exam  Vitals signs and nursing note reviewed. Constitutional:       General: He is not in acute distress. Appearance: Normal appearance. He is well-developed.  He is not ill-appearing, toxic-appearing or diaphoretic. HENT:      Head: Normocephalic and atraumatic. Mouth/Throat:      Mouth: Mucous membranes are moist.   Eyes:      General: No scleral icterus. Extraocular Movements: Extraocular movements intact. Conjunctiva/sclera: Conjunctivae normal.      Pupils: Pupils are equal, round, and reactive to light. Neck:      Musculoskeletal: Normal range of motion and neck supple. Trachea: No tracheal deviation. Cardiovascular:      Rate and Rhythm: Regular rhythm. Tachycardia present. Pulses: Normal pulses. Heart sounds: No murmur. Pulmonary:      Effort: Pulmonary effort is normal. No respiratory distress. Breath sounds: Rales (Crackles at bilateral bases) present. Abdominal:      Palpations: Abdomen is soft. Tenderness: There is no abdominal tenderness. Musculoskeletal: Normal range of motion. General: No deformity. Right lower leg: No edema. Left lower leg: No edema. Comments: Amputation of right lower extremity. Skin:     General: Skin is warm and dry. Capillary Refill: Capillary refill takes less than 2 seconds. Findings: No rash. Neurological:      General: No focal deficit present. Mental Status: He is alert and oriented to person, place, and time. Mental status is at baseline. Cranial Nerves: No cranial nerve deficit. Sensory: No sensory deficit. Motor: No weakness.       Comments: No gross neuro deficit   Psychiatric:         Mood and Affect: Mood normal.        Labs Reviewed   CBC WITH AUTOMATED DIFF - Abnormal; Notable for the following components:       Result Value    RBC 4.39 (*)     HGB 12.6 (*)     MCHC 30.7 (*)     RDW 14.8 (*)     NEUTROPHILS 74 (*)     LYMPHOCYTES 14 (*)     All other components within normal limits   TROPONIN I - Abnormal; Notable for the following components:    Troponin-I, QT 0.13 (*)     All other components within normal limits   METABOLIC PANEL, COMPREHENSIVE - Abnormal; Notable for the following components:    BUN 65 (*)     Creatinine 11.70 (*)     BUN/Creatinine ratio 6 (*)     GFR est AA 5 (*)     GFR est non-AA 4 (*)     Calcium 8.3 (*)     AST (SGOT) 42 (*)     Alk. phosphatase 119 (*)     Globulin 4.3 (*)     All other components within normal limits   NT-PRO BNP - Abnormal; Notable for the following components:    NT pro-BNP 64,121 (*)     All other components within normal limits   URINALYSIS W/ RFLX MICROSCOPIC - Abnormal; Notable for the following components:    pH (UA) >8.5 (*)     Protein 300 (*)     Glucose 250 (*)     Blood TRACE (*)     All other components within normal limits   TROPONIN I - Abnormal; Notable for the following components:    Troponin-I, QT 0.14 (*)     All other components within normal limits   MAGNESIUM   HEMOGLOBIN A1C WITH EAG   LIPID PANEL   T4, FREE   PTT   URINE MICROSCOPIC ONLY     XR CHEST PORT   Final Result   IMPRESSION:      Borderline central vascular congestion and interstitial prominence, concerning   for developing pulmonary edema. Retrocardiac left lower lobe   atelectasis/infiltrate.         Medications   nitroglycerin (NITROSTAT) tablet 0.4 mg (has no administration in time range)   ondansetron (ZOFRAN) injection 4 mg (has no administration in time range)   insulin lispro (HUMALOG) injection (has no administration in time range)   glucose chewable tablet 16 g (has no administration in time range)   glucagon (GLUCAGEN) injection 1 mg (has no administration in time range)   dextrose 10 % infusion 125-250 mL (has no administration in time range)   acetaminophen (TYLENOL) tablet 650 mg (has no administration in time range)   aspirin chewable tablet 81 mg (has no administration in time range)   albuterol-ipratropium (DUO-NEB) 2.5 MG-0.5 MG/3 ML (has no administration in time range)   melatonin tablet 12 mg (has no administration in time range)   pantoprazole (PROTONIX) injection 40 mg (has no administration in time range)   docusate sodium (COLACE) capsule 100 mg (has no administration in time range)   oxyCODONE-acetaminophen (PERCOCET) 5-325 mg per tablet 1 Tab (has no administration in time range)   nitroglycerin (NITROBID) 2 % ointment 0.5 Inch (0.5 Inches Topical Given 2/24/20 0310)   aspirin chewable tablet 162 mg (162 mg Oral Given 2/24/20 0033)     ECG grossly unchanged from 11/27/2017  ECG time 2329  Sinus tachycardia, rate 102. Normal axis. Nonspecific intraventricular conduction delay. Prolonged  ms similar to prior ECG. ST elevations previously seen on ECG on 1/1 are not present. MDM     History limited to language barrier but patient is able to communicate that he has had chest pain and shortness of breath for the past 1 hour that has almost completely resolved since onset, still present mildly. Patient is mildly tachycardic and hypertensive on arrival but in no acute distress. Physical exam is nonfocal aside from his right lower extremity amputation bilateral crackles at his lung bases. Has a slightly elevated respiratory rate and is saturating 99% on room air. He was admitted in January for ACS, found to have ST elevations in lead III but was medically managed as he had had prior PCI that showed severe three-vessel disease that was not appropriate for intervention at that time. ED Course as of Feb 24 0553 Mon Feb 24, 2020   0016 HGB(!): 12.6 [KG]   0103 NT pro-BNP(!): 64,121 [KG]   0104 Troponin-I, Qt.(!): 0.13 [KG]      ED Course User Index  [KG] Lyndsey Underwood DO       Procedures  ECHO 1/2/20  · Normal wall thickness. Mildly dilated left ventricle. Severe systolic dysfunction. Estimated left ventricular ejection fraction is 20 - 25%. Visually measured ejection fraction. Abnormal left ventricular wall motion. · Mitral valve non-specific thickening. Mild mitral valve regurgitation is present. · Mild tricuspid valve regurgitation is present. · Pulmonary arterial systolic pressure is 56.9 mmHg.  Mild pulmonary hypertension. On reevaluation patient remains persistently tachypneic, without oxygen he desaturates to 84% and does not have home oxygen. Patient will be admitted for dialysis due to fluid overload and ACS rule out. Accepted for admission by Dr. Venkat Carlson    1. Tachypnea on examination R06.82 786.06    2. SOB (shortness of breath) R06.02 786.05    3. Angina pectoris (HCC) I20.9 413.9    4.       Fluid overload with ESRD    Admit

## 2020-02-24 NOTE — ED NOTES
Patient taken off the floor to dialysis. 3L via NC in place. Vitals stable. No acute signs of distress.

## 2020-02-24 NOTE — ED NOTES
Patient sleeping in bed awakens easily. Vitals stable. Sat 97% on three liters via NC. IV to RAC. No acute signs of distress. Will continue to monitor.

## 2020-02-24 NOTE — ED NOTES
3rd set of enzymes and EKG completed. Dr. Brigitte Chan has seen EKG. Pt is sitting up in bed, no issues at this time. Still awaiting bed placement.

## 2020-02-24 NOTE — DIALYSIS
MARYANNE        ACUTE HEMODIALYSIS FLOW SHEET      HEMODIALYSIS ORDERS: Physician: Dr. Shannon Mail     Dialyzer: revaclear   Duration: 3.5 hr  BFR: 400   DFR: 800   Dialysate:  Temp 36-37*C  K+   3    Ca+  2.5 Na 140 Bicarb 35   Weight:   kg    Patient Chart []     Unable to Obtain []   Dry weight/UF Goal: 3500 ml Access LUE AVF  Needle Gauge 15    Heparin []  Bolus      Units    [] Hourly       Units    [x]None      Catheter locking solution NA   Pre BP:   160/98    Pulse:     103       Respirations: 18  Temperature:   97.8   Labs: Pre        Post:        [] N/A   Additional Orders(medications, blood products, hypotension management):       [] N/A     [x] Abnerita Consent Verified     CATHETER ACCESS: [x]N/A   []Right   []Left   []IJ     []Fem   []chest wall   [] First use X-ray verified     []Tunnel                [] Non Tunneled   []No S/S infection  []Redness  []Drainage []Cultured []Swelling []Pain   []Medical Aseptic Prep Utilized   []Dressing Changed  [] Biopatch  Date:       []Clotted   []Patent   Flows: []Good  []Poor  []Reversed   If access problem,  notified: []Yes    [x]N/A  Date:           GRAFT/FISTULA ACCESS:  []N/A     []Right     [x]Left     [x]UE     []LE   []AVG   [x]AVF        []Buttonhole    [x]Medical Aseptic Prep Utilized   [x]No S/S infection  []Redness  []Drainage []Cultured []Swelling []Pain    Bruit:   [x] Strong    [] Weak       Thrill :   [x] Strong    [] Weak       Needle Gauge: NA   Length: NA   If access problem,  notified: []Yes     [x]N/A  Date:        Please describe access if present and not used:                            GENERAL ASSESSMENT:      LUNGS:  Rate 23 SaO2%        [] N/A    [x] Clear  [] Coarse  [] Crackles  [] Wheezing        [] Diminished     Location : []RLL   []LLL    []RUL  []JANELL     Cough: []Productive  [x]Dry  []N/A   Respirations:  [x]Easy  []Labored     Therapy:   []RA  [x]NC 3 l/min    Mask: []NRB []Venti       O2%                  []Ventilator  []Intubated [] Trach  [] BiPaP     CARDIAC: [x]Regular      [] Irregular   [] Pericardial Rub  [] JVD        []  Monitored  [] Bedside  [] Remotely monitored [] N/A  Rhythm:      EDEMA: [x] None  []Generalized  [] Pitting [] 1    [] 2    [] 3    [] 4                 [] Facial  [] Pedal  []  UE  [] LE     SKIN:   [x] Warm  [] Hot     [] Cold   [x] Dry (RLE amputation)   [] Pale   [] Diaphoretic                  [] Flushed  [] Jaundiced  [] Cyanotic  [] Rash  [] Weeping     LOC:    [x] Alert      [x]Oriented:    [] Person     [] Place  []Time               [] Confused  [] Lethargic  [] Medicated  [] Non-responsive     GI / ABDOMEN:  [] Flat    [] Distended    [] Soft    [] Firm   []  Obese                             [] Diarrhea  [] Bowel Sounds  [] Nausea  [] Vomiting       / URINE ASSESSMENT:[] Voiding   [] Oliguria  [] Anuria   []  Lopez     [] Incontinent    []  Incontinent Brief      []  Bathroom Privileges       PAIN: [x] 0 []1  []2   []3   []4   []5   []6   []7   []8   []9   []10              Scale 0-10  Action/Follow Up:      MOBILITY:  [] Amb    [] Amb/Assist    [] Bed    [] Wheelchair  [x] Stretcher      All Vitals and Treatment Details on Attached [address]: [address] [place]      [] 1st Time Acute  [] Stat  [x] Routine  [] Urgent     [x] Acute Room  []  Bedside  [] ICU/CCU  [] ER   Isolation Precautions:   There are currently no Active Isolations      Special Considerations:         [] Blood Consent Verified [x]N/A     ALLERGIES: No Known Allergies            Code Status:Full Code        Hepatitis Status:    2nd RN check:                     Lab Results   Component Value Date/Time    Hepatitis B surface Ag <0.10 11/26/2019 07:59 AM    Hepatitis B surface Ab 313.68 11/26/2019 07:59 AM                     Current Labs:   Lab Results   Component Value Date/Time    Sodium 140 02/23/2020 11:45 PM    Potassium 4.3 02/23/2020 11:45 PM    Chloride 103 02/23/2020 11:45 PM    CO2 28 02/23/2020 11:45 PM    Anion gap 9 02/23/2020 11:45 PM    Glucose 85 02/23/2020 11:45 PM    BUN 65 (H) 02/23/2020 11:45 PM    Creatinine 11.70 (H) 02/23/2020 11:45 PM    BUN/Creatinine ratio 6 (L) 02/23/2020 11:45 PM    GFR est AA 5 (L) 02/23/2020 11:45 PM    GFR est non-AA 4 (L) 02/23/2020 11:45 PM    Calcium 8.3 (L) 02/23/2020 11:45 PM      Lab Results   Component Value Date/Time    WBC 7.4 02/23/2020 11:45 PM    Hemoglobin, POC 10.2 (L) 12/24/2019 11:08 AM    HGB 12.6 (L) 02/23/2020 11:45 PM    Hematocrit, POC 30 (L) 12/24/2019 11:08 AM    HCT 41.0 02/23/2020 11:45 PM    PLATELET 157 27/51/7500 11:45 PM    MCV 93.4 02/23/2020 11:45 PM                                                                                     DIET: DIET NPO       PRIMARY NURSE REPORT: First initial/Last name/Title      Pre Dialysis: Farhad Alexandra RN     Time: 0910      EDUCATION:    [x] Patient [] Other         Knowledge Basis: []None []Minimal [x] Substantial Barriers to learning  []N/A   [x] Access Care     [] S&S of infection     [] Fluid Management     []K+     [x]Procedural    []Albumin     [] Medications     [x] Tx Options     [] Transplant     [] Diet     [] Other   Teaching Tools:  [x] Explain  [] Demo  [] Handouts [] Video  Patient response:   [x] Verbalized understanding  [] Teach back  [] Return demonstration [] Requires follow up   Inappropriate due to            [x] Time Out/Safety Check  [x]Extracorporeal Circuit Tested for integrity       RO/HEMODIALYSIS MACHINE SAFETY CHECKS  Before each treatment:     Machine Number:                   [facility]                                   [] Unit Machine #  with centralized RO                                  [] Portable Machine #1/RO serial # [id number]                                  [] Portable Machine #2/RO serial # [id number]                                  [] Portable Machine #4/RO serial # [id number]                                                     [address] [place] [address] [x] Portable Machine #1/RO serial # [id number]                                   [] Portable Machine #12/RO serial # [id number]                                  [] Portable Machine #13/RO serial #  [id number]      Alarm Test:  Pass time 1003               [x] RO/Machine Log Complete      Temp    36             Dialysate: pH  7.4 Conductivity: Meter   14.2     HD Machine   14.3                  TCD: 14  Dialyzer Lot # [id number]            Blood Tubing Lot # [id number]          Saline Lot #  -[id number]     CHLORINE TESTING-Before each treatment and every 4 hours    Total Chlorine: [] less than 0.1 ppm  Time: 0945 4 Hr/2nd Check Time:    (if greater than 0.1 ppm from Primary then every 30 minutes from Secondary)     TREATMENT INITIATION  with Dialysis Precautions:   [x] All Connections Secured                 [x] Saline Line Double Clamped   [x] Venous Parameters Set                  [x] Arterial Parameters Set    [x] Prime Given 250ml                          [x]Air Foam Detector Engaged      Treatment Initiation Note:Patient was brought to HD unit on a stretcher by ER personnel, stable to begin HD treatment. His LUE AVF was accessed, and his treatment began. During Treatment Notes:  Patient tolerating treatment well, mostly resting with his eyes closed. Patient denies SOB or chest pain. Medication Dose Volume Route Time [facility] name Title                                                       Post Assessment:   Dialyzer Cleared: [x] Good [] Fair  [] Poor  Blood processed:  60.2 L  UF Removed  3500 Ml  POst BP:   163/95       Pulse: 90        Respirations: 18  Temperature: 97.5 Lungs:     [x] Clear      [] Course         [] Crackles    [] Wheezing         [] Diminished   Post Tx Vascular Access:   AVF/AVG: Bleeding stopped   Art 5 min.  Karlo. 5 Min    Cardiac:   [x] Regular   [] Irregular   [] Monitor  [] N/A      Rhythm:       Catheter:     N/A    Skin:   Pain:    [x] Warm  [x] Dry [] Diaphoretic    [] Flushed    [] Pale [] Cyanotic [x]0  []1  []2   []3  []4   []5   []6   []7   []8   []9   []10     Post Treatment Note:   Patient's blood was returned at the conclusion of his HD treatment. He tolerated well. The needles were removed from his LUE AVF, and this insertion sites were covered with gauze and tape. He was returned to the ER by ER personnel.        POST TREATMENT PRIMARY NURSE HANDOFF REPORT:     First initial/Last name/Title         Post Dialysis: Elmo Middleton RN Time:  5592     Abbreviations: AVG-arterial venous graft, AVF-arterial venous fistula, IJ-Internal Jugular, Subcl-Subclavian, Fem-Femoral, Tx-treatment, AP/HR-apical heart rate, DFR-dialysate flow rate, BFR-blood flow rate, AP-arterial pressure, -venous pressure, UF-ultrafiltrate, TMP-transmembrane pressure, Karlo-Venous, Art-Arterial, RO-Reverse Osmosis

## 2020-02-25 LAB
ALBUMIN SERPL-MCNC: 3.3 G/DL (ref 3.4–5)
ALBUMIN/GLOB SERPL: 0.8 {RATIO} (ref 0.8–1.7)
ALP SERPL-CCNC: 99 U/L (ref 45–117)
ALT SERPL-CCNC: 30 U/L (ref 16–61)
ANION GAP SERPL CALC-SCNC: 8 MMOL/L (ref 3–18)
AST SERPL-CCNC: 20 U/L (ref 10–38)
BASOPHILS # BLD: 0 K/UL (ref 0–0.1)
BASOPHILS NFR BLD: 1 % (ref 0–2)
BILIRUB SERPL-MCNC: 0.5 MG/DL (ref 0.2–1)
BUN SERPL-MCNC: 49 MG/DL (ref 7–18)
BUN/CREAT SERPL: 5 (ref 12–20)
CALCIUM SERPL-MCNC: 9 MG/DL (ref 8.5–10.1)
CHLORIDE SERPL-SCNC: 102 MMOL/L (ref 100–111)
CO2 SERPL-SCNC: 27 MMOL/L (ref 21–32)
CREAT SERPL-MCNC: 9.28 MG/DL (ref 0.6–1.3)
DIFFERENTIAL METHOD BLD: ABNORMAL
EOSINOPHIL # BLD: 0.3 K/UL (ref 0–0.4)
EOSINOPHIL NFR BLD: 3 % (ref 0–5)
ERYTHROCYTE [DISTWIDTH] IN BLOOD BY AUTOMATED COUNT: 14.8 % (ref 11.6–14.5)
GLOBULIN SER CALC-MCNC: 4.4 G/DL (ref 2–4)
GLUCOSE BLD STRIP.AUTO-MCNC: 117 MG/DL (ref 70–110)
GLUCOSE BLD STRIP.AUTO-MCNC: 76 MG/DL (ref 70–110)
GLUCOSE BLD STRIP.AUTO-MCNC: 81 MG/DL (ref 70–110)
GLUCOSE BLD STRIP.AUTO-MCNC: 95 MG/DL (ref 70–110)
GLUCOSE SERPL-MCNC: 81 MG/DL (ref 74–99)
HCT VFR BLD AUTO: 36.8 % (ref 36–48)
HGB BLD-MCNC: 11.5 G/DL (ref 13–16)
LYMPHOCYTES # BLD: 1.2 K/UL (ref 0.9–3.6)
LYMPHOCYTES NFR BLD: 14 % (ref 21–52)
MCH RBC QN AUTO: 28.9 PG (ref 24–34)
MCHC RBC AUTO-ENTMCNC: 31.3 G/DL (ref 31–37)
MCV RBC AUTO: 92.5 FL (ref 74–97)
MONOCYTES # BLD: 0.8 K/UL (ref 0.05–1.2)
MONOCYTES NFR BLD: 9 % (ref 3–10)
NEUTS SEG # BLD: 6.2 K/UL (ref 1.8–8)
NEUTS SEG NFR BLD: 73 % (ref 40–73)
PLATELET # BLD AUTO: 199 K/UL (ref 135–420)
PMV BLD AUTO: 11.7 FL (ref 9.2–11.8)
POTASSIUM SERPL-SCNC: 4.8 MMOL/L (ref 3.5–5.5)
PROT SERPL-MCNC: 7.7 G/DL (ref 6.4–8.2)
RBC # BLD AUTO: 3.98 M/UL (ref 4.7–5.5)
SODIUM SERPL-SCNC: 137 MMOL/L (ref 136–145)
WBC # BLD AUTO: 8.4 K/UL (ref 4.6–13.2)

## 2020-02-25 PROCEDURE — 77010033678 HC OXYGEN DAILY

## 2020-02-25 PROCEDURE — 36415 COLL VENOUS BLD VENIPUNCTURE: CPT

## 2020-02-25 PROCEDURE — 80053 COMPREHEN METABOLIC PANEL: CPT

## 2020-02-25 PROCEDURE — 82962 GLUCOSE BLOOD TEST: CPT

## 2020-02-25 PROCEDURE — 74011250637 HC RX REV CODE- 250/637: Performed by: INTERNAL MEDICINE

## 2020-02-25 PROCEDURE — 85025 COMPLETE CBC W/AUTO DIFF WBC: CPT

## 2020-02-25 PROCEDURE — 65270000029 HC RM PRIVATE

## 2020-02-25 PROCEDURE — 99218 HC RM OBSERVATION: CPT

## 2020-02-25 RX ADMIN — SEVELAMER CARBONATE 800 MG: 800 TABLET, FILM COATED ORAL at 17:02

## 2020-02-25 RX ADMIN — METOPROLOL SUCCINATE 25 MG: 25 TABLET, EXTENDED RELEASE ORAL at 08:57

## 2020-02-25 RX ADMIN — ASPIRIN 81 MG CHEWABLE TABLET 81 MG: 81 TABLET CHEWABLE at 08:57

## 2020-02-25 RX ADMIN — SEVELAMER CARBONATE 800 MG: 800 TABLET, FILM COATED ORAL at 08:57

## 2020-02-25 RX ADMIN — ATORVASTATIN CALCIUM 80 MG: 80 TABLET, FILM COATED ORAL at 08:57

## 2020-02-25 RX ADMIN — AMIODARONE HYDROCHLORIDE 200 MG: 200 TABLET ORAL at 08:57

## 2020-02-25 RX ADMIN — OXYCODONE HYDROCHLORIDE AND ACETAMINOPHEN 1 TABLET: 5; 325 TABLET ORAL at 17:02

## 2020-02-25 RX ADMIN — METOPROLOL SUCCINATE 25 MG: 25 TABLET, EXTENDED RELEASE ORAL at 17:04

## 2020-02-25 RX ADMIN — LISINOPRIL 2.5 MG: 5 TABLET ORAL at 08:57

## 2020-02-25 RX ADMIN — SEVELAMER CARBONATE 800 MG: 800 TABLET, FILM COATED ORAL at 11:21

## 2020-02-25 NOTE — PROGRESS NOTES
Problem: Discharge Planning  Goal: *Discharge to safe environment  Outcome: Progressing Towards Goal  Plan is Home with home health vs SNF or personal care    Spoke with Channing Carrasquillo as patient stated that he is his nephew and stated I could talk with him. Patient lives with his son   Seth Rosales [phone]     Son and patient does not understand english well. Nursing to get blue phone in room. Get Robles know we need to communicate with his legal NOK. He and Gilma Andrea patient's friends will communicate with  son he needs to be here and speak with us through the blue phone . Son will be her tomorrow after work.

## 2020-02-25 NOTE — PROGRESS NOTES
[facility]  Assessment Tracking Number: [id number] Status: Successfully Processed    Assessment Date: 12/16/2019    Lina Owusu Information:   Screener's Name: [facility]  NPI: [id number] Provider Name: [facility]

## 2020-02-25 NOTE — PROGRESS NOTES
Problem: Discharge Planning  Goal: *Discharge to safe environment  Outcome: Progressing Towards Goal     Problem: Pain  Goal: *Control of Pain  Outcome: Progressing Towards Goal  Goal: *PALLIATIVE CARE:  Alleviation of Pain  Outcome: Progressing Towards Goal     Problem: Patient Education: Go to Patient Education Activity  Goal: Patient/Family Education  Outcome: Progressing Towards Goal     Problem: Falls - Risk of  Goal: *Absence of Falls  Description  Document Easter Getting Fall Risk and appropriate interventions in the flowsheet. Outcome: Progressing Towards Goal  Note: Fall Risk Interventions:       Mentation Interventions: Door open when patient unattended    Medication Interventions: Evaluate medications/consider consulting pharmacy    Elimination Interventions: Call light in reach              Problem: Patient Education: Go to Patient Education Activity  Goal: Patient/Family Education  Outcome: Progressing Towards Goal     Problem: Diabetes Self-Management  Goal: *Disease process and treatment process  Description  Define diabetes and identify own type of diabetes; list 3 options for treating diabetes. Outcome: Progressing Towards Goal  Goal: *Incorporating nutritional management into lifestyle  Description  Describe effect of type, amount and timing of food on blood glucose; list 3 methods for planning meals. Outcome: Progressing Towards Goal  Goal: *Incorporating physical activity into lifestyle  Description  State effect of exercise on blood glucose levels. Outcome: Progressing Towards Goal  Goal: *Developing strategies to promote health/change behavior  Description  Define the ABC's of diabetes; identify appropriate screenings, schedule and personal plan for screenings. Outcome: Progressing Towards Goal  Goal: *Using medications safely  Description  State effect of diabetes medications on diabetes; name diabetes medication taking, action and side effects.   Outcome: Progressing Towards Goal  Goal: *Monitoring blood glucose, interpreting and using results  Description  Identify recommended blood glucose targets  and personal targets. Outcome: Progressing Towards Goal  Goal: *Prevention, detection, treatment of acute complications  Description  List symptoms of hyper- and hypoglycemia; describe how to treat low blood sugar and actions for lowering  high blood glucose level. Outcome: Progressing Towards Goal  Goal: *Prevention, detection and treatment of chronic complications  Description  Define the natural course of diabetes and describe the relationship of blood glucose levels to long term complications of diabetes. Outcome: Progressing Towards Goal  Goal: *Developing strategies to address psychosocial issues  Description  Describe feelings about living with diabetes; identify support needed and support network  Outcome: Progressing Towards Goal  Goal: *Insulin pump training  Outcome: Progressing Towards Goal  Goal: *Sick day guidelines  Outcome: Progressing Towards Goal  Goal: *Patient Specific Goal (EDIT GOAL, INSERT TEXT)  Outcome: Progressing Towards Goal     Problem: Patient Education: Go to Patient Education Activity  Goal: Patient/Family Education  Outcome: Progressing Towards Goal     Problem: Hypertension  Goal: *Blood pressure within specified parameters  Outcome: Progressing Towards Goal  Goal: *Fluid volume balance  Outcome: Progressing Towards Goal  Goal: *Labs within defined limits  Outcome: Progressing Towards Goal     Problem: Patient Education: Go to Patient Education Activity  Goal: Patient/Family Education  Outcome: Progressing Towards Goal     Problem: Pressure Injury - Risk of  Goal: *Prevention of pressure injury  Description  Document Enrique Scale and appropriate interventions in the flowsheet.   Outcome: Progressing Towards Goal  Note: Pressure Injury Interventions:  Sensory Interventions: Check visual cues for pain         Activity Interventions: Pressure redistribution bed/mattress(bed type)    Mobility Interventions: HOB 30 degrees or less, Pressure redistribution bed/mattress (bed type)    Nutrition Interventions: Document food/fluid/supplement intake    Friction and Shear Interventions: HOB 30 degrees or less                Problem: Patient Education: Go to Patient Education Activity  Goal: Patient/Family Education  Outcome: Progressing Towards Goal     Problem: Nutrition Deficit  Goal: *Optimize nutritional status  Outcome: Progressing Towards Goal

## 2020-02-25 NOTE — PROGRESS NOTES
Bedside shift change report given to COSME Moffett (oncoming nurse) by Sol Hernandez RN (offgoing nurse). Report included the following information SBAR, Kardex, Intake/Output, MAR and Recent Results.      6106 -- AM medications given, well tolerated, will continue to monitor. 9747 -- Wound care at the bedside, suggest the patient sees Vascular and Podiatry.      1123 -- Reassessment completed, no change in patient condition, will continue to monitor.      1600 -- Reassessment completed, no change in patient condition, will continue to monitor.      Bedside shift change report given to Sol Hernandez RN (oncoming nurse) by Hua Piper RN (offgoing nurse). Report included the following information SBAR, Kardex, Intake/Output, MAR and Recent Results.

## 2020-02-25 NOTE — PROGRESS NOTES
conducted an initial consultation and Spiritual Assessment for Rossy Curry, who is a 61 y.o.,male. Patients Primary Language is: [place]. According to the patients EMR [employer] Affiliation is: Confucianist. The reason the Patient came to the hospital is:   Patient Active Problem List    Diagnosis Date Noted    Chest pain 02/24/2020    Elevated troponin 02/24/2020    ESRD on hemodialysis ([facility] 02/24/2020    History of CVA (cerebrovascular accident) 02/24/2020    CAD (coronary artery disease) 43/02/1430    Systolic CHF, chronic ([facility] 02/24/2020    Dementia ([facility] 01/05/2020    Acute blood loss anemia 01/01/2020    HTN (hypertension) 01/01/2020    HLD (hyperlipidemia) 01/01/2020    Type II diabetes mellitus ([facility] 01/01/2020    GI bleed 01/01/2020        The  provided the following Interventions:  Initiated a relationship of care and support. Explored issues of piyush, spirituality and/or Christian needs while hospitalized. Listened empathically. Provided chaplaincy education. Provided information about Spiritual Care Services. Offered prayer and assurance of continued prayers on patient's behalf. Chart reviewed. The following outcomes were achieved:  Patient shared some information about their medical narrative and spiritual journey/beliefs. Patient processed feeling about current hospitalization. Patient expressed gratitude for the 's visit. Assessment:  Patient did not indicate any spiritual or Christian issues which require Spiritual Care Services interventions at this time. Patient does not have any Christian/cultural needs that will affect patients preferences in health care. Plan:  Chaplains will continue to follow and will provide pastoral care on an as needed or requested basis.  recommends bedside caregivers page  on duty if patient shows signs of acute spiritual or emotional distress.     [address]   Staff [address] [facility] [address] [address]   [phone]

## 2020-02-25 NOTE — WOUND CARE
IP WOUND CONSULT Juni Martin Smoker MEDICAL RECORD NUMBER:  [id number] AGE: 61 y.o. GENDER: male  : 1960 TODAY'S DATE:  2020 GENERAL  
 
[] Follow-up [x] New Consult Jocelyn Huitron is a 61 y.o. male referred by:  
[] Physician 
[x] Nursing 
[] Other: PAST MEDICAL HISTORY Past Medical History:  
Diagnosis Date  Below-knee amputation of right lower extremity ([facility] 2020  CAD (coronary artery disease) S/P CABG  Chronic pain   
 back  Diabetes mellitus, type II ([facility]  ESRD (end stage renal disease) on dialysis ([facility] LUE Fistula; M,W,F [place] [facility]. Faustino Rose  GI bleed 2020  Hypercholesterolemia  Hypertension  Pulmonary hypertension ([facility] 2020 Mild (38.8 mm Hg) by TTE on 2020  [facility]  Systolic CHF ([facility] LVEF 20-25% by TTE on 2020  Vitamin D deficiency PAST SURGICAL HISTORY Past Surgical History:  
Procedure Laterality Date  CARDIAC SURG PROCEDURE UNLIST    
 angioplasty  HX BELOW KNEE AMPUTATION Right 2020  HX CORONARY ARTERY BYPASS GRAFT    HX ENDOSCOPY  2008 EGD  HX VASCULAR ACCESS    
 LUE AV Fistula FAMILY HISTORY Family History Problem Relation Age of Onset  Heart Disease Mother  Hypertension Sister  Hypertension Brother SOCIAL HISTORY Social History Tobacco Use  Smoking status: Current Every Day Smoker Packs/day: 1.00 Years: 39.00 Pack years: 39.00  Smokeless tobacco: Never Used  Tobacco comment: Patient denies ever Smoking despite recorded history of 1 PPD x39 years Substance Use Topics  Alcohol use: No  
 Drug use: No  
 
 
ALLERGIES No Known Allergies MEDICATIONS No current facility-administered medications on file prior to encounter. Current Outpatient Medications on File Prior to Encounter Medication Sig Dispense Refill  atorvastatin (LIPITOR) 80 mg tablet Take 80 mg by mouth daily.  metoprolol succinate (TOPROL-XL) 25 mg XL tablet Take 25 mg by mouth two (2) times a day.  lisinopril (PRINIVIL, ZESTRIL) 2.5 mg tablet Take 2.5 mg by mouth daily.  chlorproMAZINE (THORAZINE) 10 mg tablet Take 10 mg by mouth three (3) times daily.  glimepiride (AMARYL) 1 mg tablet Take 1 mg by mouth Daily (before breakfast).  nitroglycerin (NITROSTAT) 0.4 mg SL tablet by SubLINGual route every five (5) minutes as needed for Chest Pain.  sevelamer carbonate (RENVELA) 800 mg tab tab Take 4 tablets by mouth three times daily with meals and 4 tablets by mouth twice daily with snacks  cholecalciferol, vitamin D3, (VITAMIN D3) 2,000 unit tab Take  by mouth daily.  amiodarone (CORDARONE) 200 mg tablet Take 200 mg by mouth daily.  fenofibrate nanocrystallized (TRICOR) 145 mg tablet Take 145 mg by mouth daily. Wt Readings from Last 3 Encounters:  
02/24/20 61.4 kg (135 lb 4.8 oz) 01/07/20 51.6 kg (113 lb 11.1 oz) 12/30/19 60.8 kg (134 lb) [unfilled] Visit Vitals /77 (BP 1 Location: Right arm, BP Patient Position: At rest) Pulse 78 Temp 97.4 °F (36.3 °C) Resp 18 Ht 5' 2\" (1.575 m) Wt 61.4 kg (135 lb 4.8 oz) SpO2 100% BMI 24.75 kg/m² ASSESSMENT Ulcer Identification: Dry gangrene. Patient has several wounds on his BLE; right distal stump, left great toe, left 5th toe, and left lateral foot (5th met). There is dry eschar covering all wounds. The wounds are stable with no bogginess, odor, or purulence. Contributing Factors: CAD, ESRD, DM 2 Wound Scrotum (Active) Number of days: 54 Wound Pretibial Proximal;Right (Active) Number of days: 54 Wound Toe (Comment  which one) Left;Distal black spots on left fifth toe (Active) Dressing Type Open to air 2/25/2020 10:15 AM  
 Non-staged Wound Description Full thickness 2/25/2020 10:15 AM  
Condition of Base Eschar 2/25/2020 10:15 AM  
Tissue Type Percent Black 100 % 2/25/2020 10:15 AM  
Drainage Amount None 2/25/2020 10:15 AM  
Wound Odor None 2/25/2020 10:15 AM  
Cleansing and Cleansing Agents  Dermal wound cleanser 2/25/2020 10:15 AM  
Dressing Changed Other (Comment) 2/25/2020 10:15 AM  
Dressing Type Applied Other (Comment) 2/25/2020 10:15 AM  
Number of days: 54 Wound Leg lower Right Amputation site (Active) Dressing Type Open to air 2/25/2020 10:15 AM  
Wound Length (cm) 2 cm 2/25/2020 10:15 AM  
Wound Width (cm) 2.6 cm 2/25/2020 10:15 AM  
Wound Surface Area (cm^2) 5.2 cm^2 2/25/2020 10:15 AM  
Condition of Base Other (comment) 2/25/2020 10:15 AM  
Drainage Amount None 2/25/2020 10:15 AM  
Wound Odor None 2/25/2020 10:15 AM  
Cleansing and Cleansing Agents  Dermal wound cleanser 2/25/2020 10:15 AM  
Dressing Changed Changed/New 2/25/2020 10:15 AM  
Dressing Type Applied Other (Comment) 2/25/2020 10:15 AM  
Number of days: 0 Wound Toe (Comment  which one) Left;Lateral (Active) Dressing Type Open to air 2/25/2020 10:15 AM  
Non-staged Wound Description Full thickness 2/25/2020 10:15 AM  
Wound Length (cm) 0.9 cm 2/25/2020 10:15 AM  
Wound Width (cm) 0.6 cm 2/25/2020 10:15 AM  
Wound Surface Area (cm^2) 0.54 cm^2 2/25/2020 10:15 AM  
Condition of Base Eschar 2/25/2020 10:15 AM  
Tissue Type Percent Black 100 % 2/25/2020 10:15 AM  
Drainage Amount None 2/25/2020 10:15 AM  
Wound Odor None 2/25/2020 10:15 AM  
Cleansing and Cleansing Agents  Dermal wound cleanser 2/25/2020 10:15 AM  
Dressing Changed Changed/New 2/25/2020 10:15 AM  
Dressing Type Applied Other (Comment) 2/25/2020 10:15 AM  
Number of days: 0 Wound Foot Left;Lateral (Active) Dressing Type Open to air 2/25/2020 10:15 AM  
Non-staged Wound Description Full thickness 2/25/2020 10:15 AM  
Wound Length (cm) 0.7 cm 2/25/2020 10:15 AM  
 Wound Width (cm) 1 cm 2/25/2020 10:15 AM  
Wound Surface Area (cm^2) 0.7 cm^2 2/25/2020 10:15 AM  
Condition of Base Eschar 2/25/2020 10:15 AM  
Tissue Type Percent Black 100 % 2/25/2020 10:15 AM  
Drainage Amount None 2/25/2020 10:15 AM  
Wound Odor None 2/25/2020 10:15 AM  
Number of days: 0 PLAN Skin Care & Pressure Relief Recommendations: Daily Betadine to all wounds. Pressure injury prevention protocols. Patient may benefit from vascular consult. Physician/Provider notified: Yes Orders:See \"Manage Orders\" tab Teaching completed with:  
[] Patient          
[] Family member      
[] Caregiver [x] Nursing 
[] Other Patient/Caregiver Teaching: 
Level of patient/caregiver understanding able to:  
[] Indicates understanding       [] Needs reinforcement 
[] Unsuccessful      [] Verbal Understanding 
[] Demonstrated understanding       [] No evidence of learning 
[] Refused teaching         [] N/A Electronically signed by Frankey Ade, RN on 2/25/2020 at 10:21 AM

## 2020-02-25 NOTE — PROGRESS NOTES
Received a call from [facility] and found out that he's not related to pt. Jus Gamez who was identified by [address] [place] [address] as person they are dealing with is just a friend of ptChandu Paulson and Jus Gamez doesn't have POA. Made them aware that will call [facility].

## 2020-02-25 NOTE — PROGRESS NOTES
Renal Progress Note  Follow up of ESRD     Assessment/Plan:  1. ESRD. Euvolemic after yesterday's dialysis, stable electrolytes. Dialysis tomorrow and continue mwf. 2. HTN, stable. Continue current regimen. 3. CAD with HFrEF. Volume overload corrected with dialysis. 4. Anemia of ESRD. H/H is above goal. No need for beth at this time. 5. Secondary hyperparathyroidism of ESRD. Continue phos binder. 6. PAD, s/p rt bka with lt foot ischemia. Follows with [facility]. Subjective:  Patient complaints off: No complaints. No SOB at rest, no chest pain, nausea or vomiting. States appetite is good.        Patient Active Problem List   Diagnosis Code    Acute blood loss anemia D62    HTN (hypertension) I10    HLD (hyperlipidemia) E78.5    Type II diabetes mellitus ([facility] E11.9    GI bleed K92.2    Dementia ([facility] F03.90    Chest pain R07.9    Elevated troponin R79.89    ESRD on hemodialysis ([facility] N18.6, Z99.2    History of CVA (cerebrovascular accident) Z80.78    CAD (coronary artery disease) I31.85    Systolic CHF, chronic (HCC) I50.22       Current Facility-Administered Medications   Medication Dose Route Frequency Provider Last Rate Last Dose    amiodarone (CORDARONE) tablet 200 mg  200 mg Oral DAILY Aldo Myers DO   200 mg at 02/25/20 0857    atorvastatin (LIPITOR) tablet 80 mg  80 mg Oral DAILY Leida Levin, DO   80 mg at 02/25/20 0857    lisinopril (PRINIVIL, ZESTRIL) tablet 2.5 mg  2.5 mg Oral DAILY Aldo Myers DO   2.5 mg at 02/25/20 0857    metoprolol succinate (TOPROL-XL) XL tablet 25 mg  25 mg Oral BID Rolando PÉREZ DO   25 mg at 02/25/20 0857    sevelamer carbonate (RENVELA) tab 800 mg  800 mg Oral TID WITH MEALS Aldo Myers DO   800 mg at 02/25/20 1121    ondansetron (ZOFRAN) injection 4 mg  4 mg IntraVENous Q4H PRN Aldo Myers DO        glucose chewable tablet 16 g  4 Tab Oral PRN Leida Levin,         glucagon (GLUCAGEN) injection 1 mg 1 mg IntraMUSCular PRN Savanah Buff, DO        dextrose 10 % infusion 125-250 mL  125-250 mL IntraVENous PRN Savanah Buff, DO        acetaminophen (TYLENOL) tablet 650 mg  650 mg Oral Q6H PRN Savanah Buff, DO        aspirin chewable tablet 81 mg  81 mg Oral DAILY Aldo Myers DO   81 mg at 02/25/20 0857    albuterol-ipratropium (DUO-NEB) 2.5 MG-0.5 MG/3 ML  3 mL Nebulization Q4H PRN Savanah Buff, DO        melatonin tablet 12 mg  12 mg Oral QHS PRN Savanah Buff, DO        docusate sodium (COLACE) capsule 100 mg  100 mg Oral BID PRN Savanah Buff, DO        oxyCODONE-acetaminophen (PERCOCET) 5-325 mg per tablet 1 Tab  1 Tab Oral Q6H PRN Savanah Buff, DO        nitroglycerin (NITROBID) 2 % ointment 0.5 Inch  0.5 Inch Topical Q6H PRN Savanah Buff, DO   0.5 Inch at 02/24/20 0310    insulin lispro (HUMALOG) injection   SubCUTAneous QID AFTER MEALS Jud William MD   Stopped at 02/24/20 2000    nitroglycerin (NITROSTAT) tablet 0.4 mg  0.4 mg SubLINGual PRN Sera Pérez E,                  Objective:  Vitals:    02/25/20 0501 02/25/20 0815 02/25/20 1123 02/25/20 1423   BP: 105/72 122/77 115/73    Pulse: 76 78 76    Resp: 18 18 18    Temp: 97.8 °F (36.6 °C) 97.4 °F (36.3 °C) 97.5 °F (36.4 °C)    TempSrc:       SpO2: 97% 100% 100%    Weight:    60.8 kg (134 lb)   Height:         . Intake/Output Summary (Last 24 hours) at 2/25/2020 1547  Last data filed at 2/25/2020 1342  Gross per 24 hour   Intake 840 ml   Output    Net 840 ml       Admission weight:Weight: 61.4 kg (135 lb 4.8 oz) (02/24/20 1549)    Last Weight Metrics:  Weight Loss Metrics 2/25/2020 1/7/2020 12/30/2019 12/24/2019 11/27/2017 8/28/2017 2/18/2010   Today's Wt 134 lb 113 lb 11.1 oz 134 lb 134 lb 167 lb 166 lb 0.1 oz 173 lb   BMI 24.51 kg/m2 19.52 kg/m2 23.74 kg/m2 23.74 kg/m2 29.58 kg/m2 29.41 kg/m2 29.68 kg/m2            Physical Exam:     General: No acute distress. Neck: no jvd.    LUNGS:Clear to Auscultation, No reales, rhonchi or wheezes. CVS EXM: S1, S2, no murmur, rub or gallop. Abdomen: Soft, Non Tender, non distended. Lower Extremities: rt bka. No le edema. Access: lt arm avf.        Labs:    CBC w/Diff Recent Labs     02/25/20  0425 02/23/20  2345   WBC 8.4 7.4   RBC 3.98* 4.39*   HGB 11.5* 12.6*   HCT 36.8 41.0    189   GRANS 73 74*   LYMPH 14* 14*   EOS 3 3        Chemistry Recent Labs     02/25/20  0425 02/23/20  2345   GLU 81 85    140   K 4.8 4.3    103   CO2 27 28   BUN 49* 65*   CREA 9.28* 11.70*   CA 9.0 8.3*   AGAP 8 9   BUCR 5* 6*   AP 99 119*   TP 7.7 8.1   ALB 3.3* 3.8   GLOB 4.4* 4.3*   AGRAT 0.8 0.9          No results found for: IRON, FE, TIBC, IBCT, PSAT, FERR   Lab Results   Component Value Date/Time    Calcium 9.0 02/25/2020 04:25 AM    CALCIUM,IONIZED 4.20 (L) 08/28/2017 10:40 AM          Bk Cook M.D  [facility]  Office [phone]  Pager [phone]

## 2020-02-25 NOTE — PROGRESS NOTES
Bedside shift report received from Manuel Newell    2000: AOX2-3, on 2l 02 via NC, resting in bed; with shortness of breath noted on exertion; assessment done; needs within reach; bed alarms on    2117: ; HS boxed lunch given; ate well    2220: resting in bed; no needs identified at this time; bed alarms on    0020: sleeping; bed alarms on    0300: sleeping comfortably; NSR on tele    0600: екатерина care, back care done; gown and linens changed    Bedside and Verbal shift change report given to Manuel Newell (oncoming nurse) by Katelyn Grayson RN (offgoing nurse). Report included the following information SBAR, Kardex, Intake/Output, Recent Results and Cardiac Rhythm NSR.

## 2020-02-25 NOTE — PROGRESS NOTES
NUTRITION INITIAL ASSESSMENT/PLAN OF CARE      RECOMMENDATIONS:   1. Will change diet to renal, dental soft with nepro BID  2. Monitor labs, weight and PO intake  3. Feed pt at meals until more alert    GOALS:   1. PO intake meets >75% of protein/calorie needs by 2/28    ASSESSMENT:   Wt status is classified as overweight per BMI of 25.5 (adjusted for amputation). Pt in and out of sleeping during assessment, 0% of lunch consumed. Supplements added/diet changed. Feed pt at meals until more alert. Nutrition recommendations listed. RD to follow. Nutrition Diagnoses:   Inadequate intake related in and out of sleep as evidence by 0% intake at lunch per tray visual    SUBJECTIVE/OBJECTIVE:   Pt see for an initial assessment/ESRD on HD. Pt admit with SOB/CP. Pt known to facility - I last completed an assessment on pt 1/7/20. Pt resting in bed during time of assessment with lunch tray in room during visit, lunch tray untouched and pt sleeping. I was able to wake pt but pt in and out of sleep during visit. Pt did report he was not hungry, that was the only question pt responded to during assessment. Per RN reports pt consumed 100% of breakfast this morning. Per last admit pt with varied intake of meals, needed assistance and received a dental soft solid diet with supplements. CBW: 135 lb which is a gain from last admission - placed order for reweight. Pts adjusted BMI for amputation is 25.5. Seen by wound care today - dry gangrene to foot/toe/stump. Will change diet to renal soft solids. Feed pt at all meals until more alert. Will send nepro BID. Will continue to monitor intake, weight, labs, POC, skin integrity.     Information Obtained from:    [x] Chart Review   [x] Patient   [] Family/Caregiver   [x] Nurse/Physician   [] Interdisciplinary Meeting/Rounds    Diet: DM CC with options 1200 kcal  Medications: [x] Reviewed  Noted: cordarone, lipitor, humalog, lisinopril, toprol-xl, renvela PRN: percocet  Allergies: [x] Reviewed   Encounter Diagnoses     ICD-10-CM ICD-9-CM   1. Tachypnea on examination R06.82 786.06   2. SOB (shortness of breath) R06.02 786.05   3. Angina pectoris (HCC) I20.9 413.9     Past Medical History:   Diagnosis Date    Below-knee amputation of right lower extremity ([facility] 01/2020    CAD (coronary artery disease)     S/P CABG    Chronic pain     back    Diabetes mellitus, type II ([facility]     ESRD (end stage renal disease) on dialysis ([facility]     LUE Fistula; M,W,[place] [employer] [place] [facility] [place]  Dr. Alcira Juan    GI bleed 01/2020    Hypercholesterolemia     Hypertension     Pulmonary hypertension ([facility] 11/02/2020    Mild (38.8 mm Hg) by TTE on 11/02/2020    Stroke ([place] [address]     Systolic CHF ([place] [address]     LVEF 20-25% by TTE on 11/02/2020    Vitamin D deficiency       Labs:    Lab Results   Component Value Date/Time    Sodium 137 02/25/2020 04:25 AM    Potassium 4.8 02/25/2020 04:25 AM    Chloride 102 02/25/2020 04:25 AM    CO2 27 02/25/2020 04:25 AM    Anion gap 8 02/25/2020 04:25 AM    Glucose 81 02/25/2020 04:25 AM    BUN 49 (H) 02/25/2020 04:25 AM    Creatinine 9.28 (H) 02/25/2020 04:25 AM    Calcium 9.0 02/25/2020 04:25 AM    Magnesium 2.4 02/23/2020 11:45 PM    Albumin 3.3 (L) 02/25/2020 04:25 AM     Lab Results   Component Value Date/Time    GLU 81 02/25/2020 04:25 AM    GLUCPOC 117 (H) 02/25/2020 11:22 AM    GLUCPOC 81 02/25/2020 07:39 AM    GLUCPOC 103 02/24/2020 09:17 PM     Lab Results   Component Value Date/Time    Cholesterol, total 103 02/24/2020 03:06 AM    HDL Cholesterol 57 02/24/2020 03:06 AM    LDL, calculated 32 02/24/2020 03:06 AM    VLDL, calculated 14 02/24/2020 03:06 AM    Triglyceride 70 02/24/2020 03:06 AM    CHOL/HDL Ratio 1.8 02/24/2020 03:06 AM     Anthropometrics: BMI (calculated): 24.7  Last 3 Recorded Weights in this Encounter    02/24/20 1549   Weight: 61.4 kg (135 lb 4.8 oz)      Ht Readings from Last 1 Encounters:   02/24/20 5' 2\" (1.575 m)     Documented Weight History:  Weight Metrics 2/24/2020 1/7/2020 12/30/2019 12/24/2019 11/27/2017 8/28/2017 2/18/2010   Weight 135 lb 4.8 oz 113 lb 11.1 oz 134 lb 134 lb 167 lb 166 lb 0.1 oz 173 lb   BMI 24.75 kg/m2 19.52 kg/m2 23.74 kg/m2 23.74 kg/m2 29.58 kg/m2 29.41 kg/m2 29.68 kg/m2   [x] Weight Loss [] Weight Gain [] Weight Stable    Patient Vitals for the past 100 hrs:   % Diet Eaten   02/25/20 0958 100 %     Estimated Nutrition Needs: [] MSJ  [x] Other:  Calories: 6501-2122 kcal (30-35 kcal/kg) Based on:   [x] Actual BW    Protein:   74-80 g (1.2-1.3 g/kg) Based on:   [x] Actual BW    Fluid:       Urine output + 1000 mL     [x] No Cultural, Christian or ethnic dietary need identified.     [] Cultural, Christian and ethnic food preferences identified and addressed     Wt Status:  [] Normal (18.6 - 24.9) [] Underweight (<18.5) [x] Overweight (25 - 29.9) [] Mild Obesity (30 - 34.9)  [] Moderate Obesity (35 - 39.9) [] Morbid Obesity (40+)     Nutrition Problems Identified:   [x] Suboptimal PO intake   [] Food Allergies  [] Difficulty chewing/swallowing/poor dentition  [] Constipation/Diarrhea   [] Nausea/Vomiting   [] None  [] Other:     Plan:   [x] Therapeutic Diet  []  Obtained/adjusted food preferences/tolerances and/or snacks options   []  Supplements added   [] Occupational therapy following for feeding techniques  []  HS snack added   [x]  Modify diet texture   []  Modify diet for food allergies   []  Educate patient   [x]  Assist at meals  [x]  Monitor PO intake on meal rounds   [x]  Continue inpatient monitoring and intervention   [x]  Participated in discharge planning/Interdisciplinary rounds/Team meetings   []  Other:     Education Needs:   [x] Not appropriate for teaching at this time due to: in and out of sleep   [] Identified and addressed    Nutrition Monitoring and Evaluation:  [x] Continue ongoing monitoring and intervention  [] Ryanne Arrieta

## 2020-02-26 LAB
GLUCOSE BLD STRIP.AUTO-MCNC: 74 MG/DL (ref 70–110)
GLUCOSE BLD STRIP.AUTO-MCNC: 78 MG/DL (ref 70–110)
GLUCOSE BLD STRIP.AUTO-MCNC: 96 MG/DL (ref 70–110)

## 2020-02-26 PROCEDURE — 82962 GLUCOSE BLOOD TEST: CPT

## 2020-02-26 PROCEDURE — 97166 OT EVAL MOD COMPLEX 45 MIN: CPT

## 2020-02-26 PROCEDURE — 77010033678 HC OXYGEN DAILY

## 2020-02-26 PROCEDURE — 74011250637 HC RX REV CODE- 250/637: Performed by: INTERNAL MEDICINE

## 2020-02-26 PROCEDURE — 65270000029 HC RM PRIVATE

## 2020-02-26 PROCEDURE — 90935 HEMODIALYSIS ONE EVALUATION: CPT

## 2020-02-26 PROCEDURE — 97535 SELF CARE MNGMENT TRAINING: CPT

## 2020-02-26 PROCEDURE — 97162 PT EVAL MOD COMPLEX 30 MIN: CPT

## 2020-02-26 RX ADMIN — ASPIRIN 81 MG CHEWABLE TABLET 81 MG: 81 TABLET CHEWABLE at 08:44

## 2020-02-26 RX ADMIN — SEVELAMER CARBONATE 800 MG: 800 TABLET, FILM COATED ORAL at 08:44

## 2020-02-26 RX ADMIN — SEVELAMER CARBONATE 800 MG: 800 TABLET, FILM COATED ORAL at 12:32

## 2020-02-26 RX ADMIN — ATORVASTATIN CALCIUM 80 MG: 80 TABLET, FILM COATED ORAL at 08:44

## 2020-02-26 RX ADMIN — AMIODARONE HYDROCHLORIDE 200 MG: 200 TABLET ORAL at 08:59

## 2020-02-26 NOTE — PROGRESS NOTES
Problem: Mobility Impaired (Adult and Pediatric)  Goal: *Acute Goals and Plan of Care (Insert Text)  Outcome: Resolved/Met   PHYSICAL THERAPY EVALUATION AND DISCHARGE    Patient: Eddy Bell (64 y.o. male)  Date: 2/26/2020  Primary Diagnosis: Chest pain [R07.9]  Elevated troponin [R79.89]  Chest pain [R07.9]  Precautions: Fall  PLOF: Wheelchair bound with assist from son  ASSESSMENT :  Pt was supine in bed at the start of the session and willing to participate in therapy. Reports pain in R LE and L foot. Supervision for supine to sit at EOB. Demonstrated good sitting balance at EOB. Min assist for stand pivot transfer to bedside commode. Supervision for sit to supine. Max assist for scooting in bed. Pt reports mobility is at his baseline. Pt remained supine in bed at the end of the session. Call bell in reach. Skilled physical therapy is not indicated at this time. PLAN :  Discharge Recommendations: Home Health  Further Equipment Recommendations for Discharge: wheelchair (has)      SUBJECTIVE:   Patient stated Jennifer damon.     OBJECTIVE DATA SUMMARY:     Past Medical History:   Diagnosis Date    Below-knee amputation of right lower extremity ([facility] 01/2020    CAD (coronary artery disease)     S/P CABG    Chronic pain     back    Diabetes mellitus, type II ([address]     ESRD (end stage renal disease) on dialysis ([address]     LUE Fistula; M,W,F [employer] in [facility] [place].  Dr. Claudio Salamanca    GI bleed 01/2020    Hypercholesterolemia     Hypertension     Pulmonary hypertension ([address] 11/02/2020    Mild (38.8 mm Hg) by TTE on 11/02/2020    Stroke ([address]     Systolic CHF ([address]     LVEF 20-25% by TTE on 11/02/2020    Vitamin D deficiency      Past Surgical History:   Procedure Laterality Date    CARDIAC SURG PROCEDURE UNLIST      angioplasty    HX BELOW KNEE AMPUTATION Right 01/2020    HX CORONARY ARTERY BYPASS GRAFT  2019    HX ENDOSCOPY  03/2008    EGD    HX VASCULAR ACCESS      LUE AV Fistula     Barriers to Learning/Limitations: yes;  language  Compensate with: Visual Cues, Verbal Cues, Tactile Cues and Kinesthetic Cues  Home Situation:   Home Situation  Home Environment: Apartment  Wheelchair Ramp: Yes  One/Two Story Residence: One story  Living Alone: No  Support Systems: Child(carrie)  Patient Expects to be Discharged to[de-identified] Apartment  Current DME Used/Available at Home: Wheelchair  Critical Behavior:  Neurologic State: Alert  Orientation Level: Oriented to person  Cognition: Follows commands  Safety/Judgement: Fall prevention  Psychosocial  Patient Behaviors: Cooperative    Strength:    Manual Muscle Testing (LE)         R     L    Hip Flexion:   3+/5  4/5  Knee EXT:   3/5  3+/5  Knee FLEX:     3+/5  Ankle DF:     4/5  _________________________________________________   Range Of Motion:  RLE  Hip: WFL  Knee: WFL  Ankle: WFL  LLE  Hip: WFL  Knee: WFL  Functional Mobility:  Bed Mobility:  Supine to Sit: Supervision  Sit to Supine: Supervision  Scooting: Maximum assistance  Transfers:  Sit to Stand: Minimum assistance  Stand to Sit: Minimum assistance  Balance:   Sitting: Impaired  Sitting - Static: Good (unsupported)  Sitting - Dynamic: Good (unsupported)  Standing: Impaired  Standing - Static: Fair  Standing - Dynamic : Fair  Neuro Re-education:  Sitting balance at EOB,   Pain:  Pain level pre-treatment: 0/10   Pain level post-treatment: 0/10    Activity Tolerance:   Good    After treatment:   []         Patient left in no apparent distress sitting up in chair  [x]         Patient left in no apparent distress in bed  [x]         Call bell left within reach  [x]         Nursing notified  []         Caregiver present  []         Bed alarm activated  []         SCDs applied    COMMUNICATION/EDUCATION:   [x]         Role of physical therapy in the acute care setting. [x]         Fall prevention education was provided and the patient/caregiver indicated understanding.   [x]         Patient/family have participated as able in goal setting and plan of care. [x]         Patient/family agree to work toward stated goals and plan of care. []         Patient understands intent and goals of therapy, but is neutral about his/her participation. []         Patient is unable to participate in goal setting/plan of care: ongoing with therapy staff.     Thank you for this referral.  Bernardo Lorenzo, SPT    Time Calculation: 14 mins    Eval Complexity: History: MEDIUM  Complexity : 1-2 comorbidities / personal factors will impact the outcome/ POC Exam:MEDIUM Complexity : 3 Standardized tests and measures addressing body structure, function, activity limitation and / or participation in recreation  Presentation: MEDIUM Complexity : Evolving with changing characteristics  Clinical Decision Making:Medium Complexity   Clinical judgement; ROM, MMT, functional mobility Overall Complexity:MEDIUM

## 2020-02-26 NOTE — PROGRESS NOTES
Problem: Discharge Planning  Goal: *Discharge to safe environment  Outcome: Progressing Towards Goal     Problem: Pain  Goal: *Control of Pain  Outcome: Progressing Towards Goal  Goal: *PALLIATIVE CARE:  Alleviation of Pain  Outcome: Progressing Towards Goal     Problem: Patient Education: Go to Patient Education Activity  Goal: Patient/Family Education  Outcome: Progressing Towards Goal     Problem: Falls - Risk of  Goal: *Absence of Falls  Description  Document Ulises Akins Fall Risk and appropriate interventions in the flowsheet. Outcome: Progressing Towards Goal  Note: Fall Risk Interventions:  Mobility Interventions: Bed/chair exit alarm, Patient to call before getting OOB, Strengthening exercises (ROM-active/passive)    Mentation Interventions: Adequate sleep, hydration, pain control, Bed/chair exit alarm, Door open when patient unattended, More frequent rounding, Toileting rounds, Update white board    Medication Interventions: Evaluate medications/consider consulting pharmacy, Patient to call before getting OOB    Elimination Interventions: Call light in reach, Bed/chair exit alarm, Patient to call for help with toileting needs, Toileting schedule/hourly rounds, Urinal in reach              Problem: Patient Education: Go to Patient Education Activity  Goal: Patient/Family Education  Outcome: Progressing Towards Goal     Problem: Diabetes Self-Management  Goal: *Disease process and treatment process  Description  Define diabetes and identify own type of diabetes; list 3 options for treating diabetes. Outcome: Progressing Towards Goal  Goal: *Incorporating nutritional management into lifestyle  Description  Describe effect of type, amount and timing of food on blood glucose; list 3 methods for planning meals. Outcome: Progressing Towards Goal  Goal: *Incorporating physical activity into lifestyle  Description  State effect of exercise on blood glucose levels.   Outcome: Progressing Towards Goal  Goal: *Developing strategies to promote health/change behavior  Description  Define the ABC's of diabetes; identify appropriate screenings, schedule and personal plan for screenings. Outcome: Progressing Towards Goal  Goal: *Using medications safely  Description  State effect of diabetes medications on diabetes; name diabetes medication taking, action and side effects. Outcome: Progressing Towards Goal  Goal: *Monitoring blood glucose, interpreting and using results  Description  Identify recommended blood glucose targets  and personal targets. Outcome: Progressing Towards Goal  Goal: *Prevention, detection, treatment of acute complications  Description  List symptoms of hyper- and hypoglycemia; describe how to treat low blood sugar and actions for lowering  high blood glucose level. Outcome: Progressing Towards Goal  Goal: *Prevention, detection and treatment of chronic complications  Description  Define the natural course of diabetes and describe the relationship of blood glucose levels to long term complications of diabetes.   Outcome: Progressing Towards Goal  Goal: *Developing strategies to address psychosocial issues  Description  Describe feelings about living with diabetes; identify support needed and support network  Outcome: Progressing Towards Goal  Goal: *Sick day guidelines  Outcome: Progressing Towards Goal  Goal: *Patient Specific Goal (EDIT GOAL, INSERT TEXT)  Outcome: Progressing Towards Goal     Problem: Patient Education: Go to Patient Education Activity  Goal: Patient/Family Education  Outcome: Progressing Towards Goal     Problem: Hypertension  Goal: *Blood pressure within specified parameters  Outcome: Progressing Towards Goal  Goal: *Fluid volume balance  Outcome: Progressing Towards Goal  Goal: *Labs within defined limits  Outcome: Progressing Towards Goal     Problem: Patient Education: Go to Patient Education Activity  Goal: Patient/Family Education  Outcome: Progressing Towards Goal Problem: Pressure Injury - Risk of  Goal: *Prevention of pressure injury  Description  Document Enrique Scale and appropriate interventions in the flowsheet.   Outcome: Progressing Towards Goal  Note: Pressure Injury Interventions:  Sensory Interventions: Check visual cues for pain, Assess changes in LOC         Activity Interventions: Increase time out of bed, Pressure redistribution bed/mattress(bed type)    Mobility Interventions: HOB 30 degrees or less    Nutrition Interventions: Document food/fluid/supplement intake, Offer support with meals,snacks and hydration    Friction and Shear Interventions: HOB 30 degrees or less                Problem: Patient Education: Go to Patient Education Activity  Goal: Patient/Family Education  Outcome: Progressing Towards Goal     Problem: Nutrition Deficit  Goal: *Optimize nutritional status  Outcome: Progressing Towards Goal

## 2020-02-26 NOTE — PROGRESS NOTES
Bedside shift report received from Blade Aggarwal RN    2010: AOX3, with periods of confusion, on 2l resting in bed; with shortness of breath noted on exertion; denies any pain or other discomforts; shift assessment completed    2152: BS 76; boxed lunch given    0030: resting in bed; no complaints voiced    0300: sleeping; no needs identified at this time    0600: awake; assisted with needs    0700: IV dislodged; new IV heplock inserted; wound care on left leg done    Bedside and Verbal shift change report given to Manuel Rodriguez 1 (oncoming nurse) by Simon Warner RN (offgoing nurse). Report included the following information SBAR, Kardex, Intake/Output, Recent Results and Cardiac Rhythm NSR.

## 2020-02-26 NOTE — CDMP QUERY
\"Fluid overload with ESRD\" was noted in the ED Provider Notes; however, it is not noted in subsequent documentation. Please clarify if this condition was: 
 
=> Treated & resolved 
=> Ongoing/Improving 
=> Ruled Out 
=> Other Explanation of the Clinical Findings 
=> Clinically Undetermined (no explanation for clinical findings) The medical record reflects the following clinical findings, risk factors and treatment:  
 
Risk Factors:  ESRD Clinical Indicators:   
- Per ED note: Patient will be admitted for dialysis due to fluid overload;  Fluid overload with ESRD 
- CXR: Borderline central vascular congestion and interstitial prominence, concerning for developing pulmonary edema. Retrocardiac left lower lobe atelectasis/infiltrate. Treatments: nephrology consulted, receiving dialysis Thank you, [id number] [facility], [facility] 
[phone]

## 2020-02-26 NOTE — PROGRESS NOTES
Bedside shift change report given to COSME Moffett (oncoming nurse) by Parish Gauthier RN (offgoing nurse). Report included the following information SBAR, Kardex, Intake/Output, MAR and Recent Results.      0859 -- AM medications given, well tolerated, will continue to monitor.      1141 -- Reassessment completed, no change in patient condition, will continue to monitor. 1530 -- Patient off unit to Dialysis. Reassessment completed, no change in patient condition, will continue to monitor.      Bedside shift change report given to [facility], [address]) by Harish Andre RN (offgoing nurse). Report included the following information SBAR, Kardex, Intake/Output, MAR and Recent Results.

## 2020-02-26 NOTE — PROGRESS NOTES
Problem: Discharge Planning  Goal: *Discharge to safe environment  Outcome: Progressing Towards Goal     Problem: Pain  Goal: *Control of Pain  Outcome: Progressing Towards Goal  Goal: *PALLIATIVE CARE:  Alleviation of Pain  Outcome: Progressing Towards Goal     Problem: Patient Education: Go to Patient Education Activity  Goal: Patient/Family Education  Outcome: Progressing Towards Goal     Problem: Falls - Risk of  Goal: *Absence of Falls  Description  Document Kati Falk Fall Risk and appropriate interventions in the flowsheet. Outcome: Progressing Towards Goal  Note: Fall Risk Interventions:  Mobility Interventions: Bed/chair exit alarm    Mentation Interventions: Adequate sleep, hydration, pain control    Medication Interventions: Evaluate medications/consider consulting pharmacy    Elimination Interventions: Call light in reach              Problem: Patient Education: Go to Patient Education Activity  Goal: Patient/Family Education  Outcome: Progressing Towards Goal     Problem: Diabetes Self-Management  Goal: *Disease process and treatment process  Description  Define diabetes and identify own type of diabetes; list 3 options for treating diabetes. Outcome: Progressing Towards Goal  Goal: *Incorporating nutritional management into lifestyle  Description  Describe effect of type, amount and timing of food on blood glucose; list 3 methods for planning meals. Outcome: Progressing Towards Goal  Goal: *Incorporating physical activity into lifestyle  Description  State effect of exercise on blood glucose levels. Outcome: Progressing Towards Goal  Goal: *Developing strategies to promote health/change behavior  Description  Define the ABC's of diabetes; identify appropriate screenings, schedule and personal plan for screenings.   Outcome: Progressing Towards Goal  Goal: *Using medications safely  Description  State effect of diabetes medications on diabetes; name diabetes medication taking, action and side effects. Outcome: Progressing Towards Goal  Goal: *Monitoring blood glucose, interpreting and using results  Description  Identify recommended blood glucose targets  and personal targets. Outcome: Progressing Towards Goal  Goal: *Prevention, detection, treatment of acute complications  Description  List symptoms of hyper- and hypoglycemia; describe how to treat low blood sugar and actions for lowering  high blood glucose level. Outcome: Progressing Towards Goal  Goal: *Prevention, detection and treatment of chronic complications  Description  Define the natural course of diabetes and describe the relationship of blood glucose levels to long term complications of diabetes. Outcome: Progressing Towards Goal  Goal: *Developing strategies to address psychosocial issues  Description  Describe feelings about living with diabetes; identify support needed and support network  Outcome: Progressing Towards Goal  Goal: *Insulin pump training  Outcome: Progressing Towards Goal  Goal: *Sick day guidelines  Outcome: Progressing Towards Goal  Goal: *Patient Specific Goal (EDIT GOAL, INSERT TEXT)  Outcome: Progressing Towards Goal     Problem: Patient Education: Go to Patient Education Activity  Goal: Patient/Family Education  Outcome: Progressing Towards Goal     Problem: Hypertension  Goal: *Blood pressure within specified parameters  Outcome: Progressing Towards Goal  Goal: *Fluid volume balance  Outcome: Progressing Towards Goal  Goal: *Labs within defined limits  Outcome: Progressing Towards Goal     Problem: Patient Education: Go to Patient Education Activity  Goal: Patient/Family Education  Outcome: Progressing Towards Goal     Problem: Pressure Injury - Risk of  Goal: *Prevention of pressure injury  Description  Document Enrique Scale and appropriate interventions in the flowsheet.   Outcome: Progressing Towards Goal  Note: Pressure Injury Interventions:  Sensory Interventions: Check visual cues for pain         Activity Interventions: Increase time out of bed    Mobility Interventions: HOB 30 degrees or less    Nutrition Interventions: Document food/fluid/supplement intake    Friction and Shear Interventions: HOB 30 degrees or less                Problem: Patient Education: Go to Patient Education Activity  Goal: Patient/Family Education  Outcome: Progressing Towards Goal     Problem: Nutrition Deficit  Goal: *Optimize nutritional status  Outcome: Progressing Towards Goal     Problem: Patient Education: Go to Patient Education Activity  Goal: Patient/Family Education  Outcome: Progressing Towards Goal     Problem: Patient Education: Go to Patient Education Activity  Goal: Patient/Family Education  Outcome: Progressing Towards Goal

## 2020-02-26 NOTE — DIALYSIS
MARYANNE        ACUTE HEMODIALYSIS FLOW SHEET      HEMODIALYSIS ORDERS: Physician: Dr. Harish Yoo     Dialyzer: revaclear   Duration: 3.5 hr  BFR: 400   DFR: 800   Dialysate:  Temp 36-37*C  K+   2    Ca+  2.5 Na 140 Bicarb 35   Weight:   kg    Patient Chart []     Unable to Obtain []   Dry weight/UF Goal: 3500 ml Access LUE AVF  Needle Gauge 15    Heparin []  Bolus      Units    [] Hourly       Units    [x]None      Catheter locking solution NA   Pre BP:   104/66    Pulse:     65       Respirations: 18  Temperature:   97.8   Labs: Pre        Post:        [] N/A   Additional Orders(medications, blood products, hypotension management):       [] N/A     [x] Abnerita Consent Verified     CATHETER ACCESS: [x]N/A   []Right   []Left   []IJ     []Fem   []chest wall   [] First use X-ray verified     []Tunnel                [] Non Tunneled   []No S/S infection  []Redness  []Drainage []Cultured []Swelling []Pain   []Medical Aseptic Prep Utilized   []Dressing Changed  [] Biopatch  Date:       []Clotted   []Patent   Flows: []Good  []Poor  []Reversed   If access problem,  notified: []Yes    [x]N/A  Date:           GRAFT/FISTULA ACCESS:  []N/A     []Right     [x]Left     [x]UE     []LE   []AVG   [x]AVF        []Buttonhole    [x]Medical Aseptic Prep Utilized   [x]No S/S infection  []Redness  []Drainage []Cultured []Swelling []Pain    Bruit:   [x] Strong    [] Weak       Thrill :   [x] Strong    [] Weak       Needle Gauge: NA   Length: NA   If access problem,  notified: []Yes     [x]N/A  Date:        Please describe access if present and not used:                            GENERAL ASSESSMENT:      LUNGS:  Rate 23 SaO2%        [] N/A    [x] Clear  [] Coarse  [] Crackles  [] Wheezing        [] Diminished     Location : []RLL   []LLL    []RUL  []JANELL     Cough: []Productive  [x]Dry  []N/A   Respirations:  [x]Easy  []Labored     Therapy:   []RA  [x]NC 3 l/min    Mask: []NRB []Venti       O2%                  []Ventilator  []Intubated [] Trach  [] BiPaP     CARDIAC: [x]Regular      [] Irregular   [] Pericardial Rub  [] JVD        []  Monitored  [] Bedside  [] Remotely monitored [] N/A  Rhythm:      EDEMA: [x] None  []Generalized  [] Pitting [] 1    [] 2    [] 3    [] 4                 [] Facial  [] Pedal  []  UE  [] LE     SKIN:   [x] Warm  [] Hot     [] Cold   [x] Dry (RLE amputation)   [] Pale   [] Diaphoretic                  [] Flushed  [] Jaundiced  [] Cyanotic  [] Rash  [] Weeping     LOC:    [x] Alert      [x]Oriented:    [] Person     [] Place  []Time               [] Confused  [] Lethargic  [] Medicated  [] Non-responsive     GI / ABDOMEN:  [] Flat    [] Distended    [] Soft    [] Firm   []  Obese                             [] Diarrhea  [] Bowel Sounds  [] Nausea  [] Vomiting       / URINE ASSESSMENT:[] Voiding   [] Oliguria  [] Anuria   []  Lopez     [] Incontinent    []  Incontinent Brief      []  Bathroom Privileges       PAIN: [x] 0 []1  []2   []3   []4   []5   []6   []7   []8   []9   []10              Scale 0-10  Action/Follow Up:      MOBILITY:  [] Amb    [] Amb/Assist    [x] Bed    [] Wheelchair  [x] Stretcher      All Vitals and Treatment Details on Attached [facility] SYSTEM [place]: [place] [facility]          Room # 3036/01      [] 1st Time Acute  [] Stat  [x] Routine  [] Urgent     [x] Acute Room  []  Bedside  [] ICU/CCU  [] ER   Isolation Precautions:   There are currently no Active Isolations      Special Considerations:         [] Blood Consent Verified [x]N/A     ALLERGIES: No Known Allergies            Code Status:Full Code        Hepatitis Status:    2nd RN check:                     Lab Results   Component Value Date/Time    Hepatitis B surface Ag <0.10 11/26/2019 07:59 AM    Hepatitis B surface Ab 313.68 11/26/2019 07:59 AM                     Current Labs:   Lab Results   Component Value Date/Time    Sodium 137 02/25/2020 04:25 AM    Potassium 4.8 02/25/2020 04:25 AM    Chloride 102 02/25/2020 04:25 AM    CO2 27 02/25/2020 04:25 AM    Anion gap 8 02/25/2020 04:25 AM    Glucose 81 02/25/2020 04:25 AM    BUN 49 (H) 02/25/2020 04:25 AM    Creatinine 9.28 (H) 02/25/2020 04:25 AM    BUN/Creatinine ratio 5 (L) 02/25/2020 04:25 AM    GFR est AA 7 (L) 02/25/2020 04:25 AM    GFR est non-AA 6 (L) 02/25/2020 04:25 AM    Calcium 9.0 02/25/2020 04:25 AM      Lab Results   Component Value Date/Time    WBC 8.4 02/25/2020 04:25 AM    Hemoglobin, POC 10.2 (L) 12/24/2019 11:08 AM    HGB 11.5 (L) 02/25/2020 04:25 AM    Hematocrit, POC 30 (L) 12/24/2019 11:08 AM    HCT 36.8 02/25/2020 04:25 AM    PLATELET 669 00/54/9700 04:25 AM    MCV 92.5 02/25/2020 04:25 AM                                                                                     DIET: DIET RENAL  DIET NUTRITIONAL SUPPLEMENTS [facility])       PRIMARY NURSE REPORT: First initial/Last name/Title      Pre Dialysis: Zack Pinto RN     Time: 1118     EDUCATION:    [x] Patient [] Other         Knowledge Basis: []None []Minimal [x] Substantial Barriers to learning  []N/A   [x] Access Care     [] S&S of infection     [] Fluid Management     []K+     [x]Procedural    []Albumin     [] Medications     [x] Tx Options     [] Transplant     [] Diet     [] Other   Teaching Tools:  [x] Explain  [] Demo  [] Handouts [] Video  Patient response:   [x] Verbalized understanding  [] Teach back  [] Return demonstration [] Requires follow up   Inappropriate due to            [x] Time Out/Safety Check  [x]Extracorporeal Circuit Tested for integrity       RO/HEMODIALYSIS MACHINE SAFETY CHECKS  Before each treatment:     Machine Number:                   [facility]                                   [] Unit Machine #  with centralized RO                                  [] Portable Machine #1/RO serial # [id number]                                  [] Portable Machine #2/RO serial # [id number]                                  [] Portable Machine #4/RO serial # [id number] [facility]                                  [x] Portable Machine #2/RO serial #2 [id number]                                   [] Portable Machine #12/RO serial # [id number]                                  [] Portable Machine #13/RO serial #  [id number]      Alarm Test:  Pass time 1600              [x] RO/Machine Log Complete      Temp    36             Dialysate: pH  7.4 Conductivity: Meter   14.2     HD Machine   14.3                  TCD: 14  Dialyzer Lot # [id number]            Blood Tubing Lot # [id number]          Saline Lot #  -FW     CHLORINE TESTING-Before each treatment and every 4 hours    Total Chlorine: [] less than 0.1 ppm  Time: 0945 4 Hr/2nd Check Time:    (if greater than 0.1 ppm from Primary then every 30 minutes from Secondary)     TREATMENT INITIATION  with Dialysis Precautions:   [x] All Connections Secured                 [x] Saline Line Double Clamped   [x] Venous Parameters Set                  [x] Arterial Parameters Set    [x] Prime Given 250ml                          [x]Air Foam Detector Engaged      Treatment Initiation Note:Patient arrived in stable condition. VSS Left AVF accessed. HD initiated without difficulty. During Treatment Notes:  Patient tolerating treatment well,resting with his eyes closed. No c/o SOB   pain. Medication Dose Volume Route Time [facility] name Title                                                       Post Assessment:   Dialyzer Cleared: [x] Good [] Fair  [] Poor  Blood processed:  79.2 L  UF Removed  3500 Ml  POst BP:   138/85       Pulse: 90        Respirations: 18  Temperature: 97.5 Lungs:     [x] Clear      [] Course         [] Crackles    [] Wheezing         [] Diminished   Post Tx Vascular Access:   AVF/AVG: Bleeding stopped   Art 5 min.  Karlo. 5 Min    Cardiac:   [x] Regular   [] Irregular   [] Monitor  [] N/A      Rhythm:       Catheter:     N/A    Skin:   Pain:    [x] Warm  [x] Dry [] Diaphoretic    [] Flushed    [] Pale [] Cyanotic [x]0  []1  []2   []3  []4   []5   []6   []7   []8   []9   []10     Post Treatment Note:   Patient completed and tolerated 3.5 hrs of HD. 2800 mls fluid removed. Report called to primary nurse, patient returned back to his room via bed.       POST TREATMENT PRIMARY NURSE HANDOFF REPORT:     First initial/Last name/Title         Post Dialysis: Patsy Day RN Time:  2020     Abbreviations: AVG-arterial venous graft, AVF-arterial venous fistula, IJ-Internal Jugular, Subcl-Subclavian, Fem-Femoral, Tx-treatment, AP/HR-apical heart rate, DFR-dialysate flow rate, BFR-blood flow rate, AP-arterial pressure, -venous pressure, UF-ultrafiltrate, TMP-transmembrane pressure, Karlo-Venous, Art-Arterial, RO-Reverse Osmosis

## 2020-02-26 NOTE — PROGRESS NOTES
Problem: Self Care Deficits Care Plan (Adult)  Goal: *Acute Goals and Plan of Care (Insert Text)  Description  Occupational Therapy Goals  Initiated 2/26/2020 within 7 day(s). 1.  Patient will perform self-feeding with modified independence and no spillage. 2.  Patient will perform upper body and lower body dressing with modified independence. 3.  Patient will perform bathing with modified independence. 4.  Patient will perform bedside commode transfers with modified independence. 5.  Patient will perform all aspects of toileting with modified independence. 6.  Patient will participate in upper extremity therapeutic exercise/activities with modified independence for 25 minutes. 7.  Patient will utilize energy conservation techniques during functional activities with min verbal and visual cues. Prior Level of Function: Mod I with ADLs (spongebathing) and Mod I w/ BSC & w/c transfers from bed surface, son/Paul assists with IADLs, meal prep and home making tasks   Outcome: Progressing Towards Goal  OCCUPATIONAL THERAPY EVALUATION    Patient: Tuan Shelby (61 y.o. male)  Date: 2/26/2020  Primary Diagnosis: Chest pain [R07.9]  Elevated troponin [R79.89]  Chest pain [R07.9]        Precautions:   Fall, Skin  PLOF: see above    ASSESSMENT :  Based on the objective data described below, the patient presents with decreased strength BUE MMT 4/5, BUE AROM WFL, functional activity tolerance Fair, Balance: sitting static good, sitting dynamic fair, standing static Fair-, standing dynamic not tested d/t medical concern/safety, safety awareness impaired and minimal FM coordination deficits, which is inhibiting ability to perform ADLs and functional transfers independently.  Based upon clinical judgement, patient requires assist with functional mobility e.g min a with bed mobility for supine to sit and SBA sit to supine, bedside commode transfer: CGA with verbal cues for safety awareness, UB bathing with set up, LB bathing with SBA, UB dress with SBA, LB dress with CGA I.e. pants using compensatory technique, dep with L sock, toileting with CGA using compensatory technique, grooming with set up and self feeding with Mod I and mod spillage per nursing report and evidenced by food spillage on front of hospital gown. Patient educated on role of OT and POC; patient verbalized understanding. Pt. Instructed on safety awareness with importance to utilize call bell to request assist with functional transfers to prevent falls, patient verbalized understanding and provided accurate demonstration. Dry erase communication board updated for accuracy w/ carryover for bedside commode transfers. Patient will benefit from skilled intervention to address the above impairments. Patient's rehabilitation potential is considered to be Excellent  Factors which may influence rehabilitation potential include:   [x]             None noted  []             Mental ability/status  []             Medical condition  []             Home/family situation and support systems  []             Safety awareness  []             Pain tolerance/management  []             Other:      PLAN :  Recommendations and Planned Interventions:   [x]               Self Care Training                  [x]      Therapeutic Activities  [x]               Functional Mobility Training   []      Cognitive Retraining  [x]               Therapeutic Exercises           []      Endurance Activities  [x]               Balance Training                    []      Neuromuscular Re-Education  []               Visual/Perceptual Training     [x]      Home Safety Training  [x]               Patient Education                   [x]      Family Training/Education  []               Other (comment):    Frequency/Duration: Patient will be followed by occupational therapy 3 - 5 times a week to address goals.   Discharge Recommendations: Home Health  Further Equipment Recommendations for Discharge: bedside commode, hospital bed, and wheelchair 18 inch     SUBJECTIVE:   Patient stated I don't use the bathtub at home.     OBJECTIVE DATA SUMMARY:     Past Medical History:   Diagnosis Date    Below-knee amputation of right lower extremity ([facility] 01/2020    CAD (coronary artery disease)     S/P CABG    Chronic pain     back    Diabetes mellitus, type II ([facility]     ESRD (end stage renal disease) on dialysis ([facility]     LUE Fistula; M,W,F [place] [facility]. Dr. Madrigal Cluster    GI bleed 01/2020    Hypercholesterolemia     Hypertension     Pulmonary hypertension ([facility] 11/02/2020    Mild (38.8 mm Hg) by TTE on 11/02/2020    [facility]     Systolic [facility]F ([facility]     LVEF 20-25% by TTE on 11/02/2020    Vitamin D deficiency      Past Surgical History:   Procedure Laterality Date    CARDIAC SURG PROCEDURE UNLIST      angioplasty    HX BELOW KNEE AMPUTATION Right 01/2020    HX CORONARY ARTERY BYPASS GRAFT  2019    HX ENDOSCOPY  03/2008    EGD    HX VASCULAR ACCESS      LUE AV Fistula     Barriers to Learning/Limitations: None  Compensate with: visual, verbal, tactile, kinesthetic cues/model    Home Situation:   Home Situation  Home Environment: Apartment  Wheelchair Ramp: Yes  One/Two Story Residence: One story  Living Alone: No  Support Systems: Child(carrie)  Patient Expects to be Discharged to[de-identified] Apartment  Current DME Used/Available at Home: Wheelchair, Commode, bedside  Tub or Shower Type: Tub/Shower combination(does not use)  []  Right hand dominant   []  Left hand dominant    Cognitive/Behavioral Status:  Neurologic State: Alert  Orientation Level: Oriented to place;Oriented to person  Cognition: Follows commands  Safety/Judgement: Fall prevention    Skin: L toe(s) and (R) residual limb with ulcers/dry gangrene  Edema: none noted    Vision/Perceptual:  appears intact    Coordination: BUE  Coordination: Within functional limits(BUEs)  Fine Motor Skills-Upper: Left Intact; Right Intact    Gross Motor Skills-Upper: Left Intact; Right Intact    Balance:  Sitting: Impaired  Sitting - Static: Good (unsupported)  Sitting - Dynamic: Fair (occasional)  Standing: Impaired  Standing - Static: Fair(-)  Standing - Dynamic : Not tested    Strength: BUE  Strength: Generally decreased, functional(BUEs 4/5)    Tone & Sensation: BUE  Tone: Normal(BUEs)    Range of Motion: BUE  AROM: Within functional limits(BUEs)    Functional Mobility and Transfers for ADLs:  Bed Mobility:     Supine to Sit: Minimum assistance  Sit to Supine: Stand-by assistance  Scooting: Contact guard assistance     Toilet Transfer : Contact guard assistance     ADL Assessment:   Feeding: Modified independent(mod spillage per nursing)    Oral Facial Hygiene/Grooming: Setup    Bathing: Stand-by assistance    Upper Body Dressing: Stand-by assistance    Lower Body Dressing: Contact guard assistance pants, dep L sock     Toileting: Contact guard assistance    Cognitive Retraining  Safety/Judgement: Fall prevention    Pain:  Pain level pre-treatment: 5/10   Pain level post-treatment: 5/10   Pain Intervention(s): Medication (see MAR); Rest, Repositioning  Response to intervention: Nurse notified, See doc flow    Activity Tolerance:   fair  Please refer to the flowsheet for vital signs taken during this treatment. After treatment:   [] Patient left in no apparent distress sitting up in chair  [x] Patient left in no apparent distress in bed  [x] Call bell left within reach  [x] Nursing notified  [] Caregiver present  [] Bed alarm activated    COMMUNICATION/EDUCATION:   [x] Role of Occupational Therapy in the acute care setting  [x] Home safety education was provided and the patient/caregiver indicated understanding. [x] Patient/family have participated as able in goal setting and plan of care. [x] Patient/family agree to work toward stated goals and plan of care. [] Patient understands intent and goals of therapy, but is neutral about his/her participation.   [] Patient is unable to participate in goal setting and plan of care. Thank you for this referral.  Gloria Shaffer  Time Calculation: 31 mins    Eval Complexity: History: MEDIUM Complexity : Expanded review of history including physical, cognitive and psychosocial  history ; Examination: MEDIUM Complexity : 3-5 performance deficits relating to physical, cognitive , or psychosocial skils that result in activity limitations and / or participation restrictions; Decision Making:MEDIUM Complexity : Patient may present with comorbidities that affect occupational performnce.  Miniml to moderate modification of tasks or assistance (eg, physical or verbal ) with assesment(s) is necessary to enable patient to complete evaluation

## 2020-02-26 NOTE — PROGRESS NOTES
Clarification:  Patient had atypical chest pain  Clarification:  Patient had fluid overload from ESRD that was treated and resolved.

## 2020-02-26 NOTE — PROGRESS NOTES
conducted an initial consultation and Spiritual Assessment for Rossy Curry, who is a 61 y.o.,male. Patient's Primary Language is: [place]. According to the patient's EMR Synagogue Affiliation is: Episcopalian. The reason the Patient came to the hospital is:   Patient Active Problem List    Diagnosis Date Noted    Chest pain 02/24/2020    Elevated troponin 02/24/2020    ESRD on hemodialysis ([facility] 02/24/2020    History of CVA (cerebrovascular accident) 02/24/2020    CAD (coronary artery disease) 51/10/0224    Systolic CHF, chronic ([facility] 02/24/2020    Dementia ([facility] 01/05/2020    Acute blood loss anemia 01/01/2020    HTN (hypertension) 01/01/2020    HLD (hyperlipidemia) 01/01/2020    Type II diabetes mellitus ([facility] 01/01/2020    GI bleed 01/01/2020        The  provided the following Interventions:  Initiated a relationship of care and support. Explored issues of piyush, belief, spirituality and Methodist/ritual needs while hospitalized. Listened empathically. Offered prayer and assurance of continued prayers on patient's behalf. The following outcomes where achieved:  Patient shared limited information about both their medical narrative and spiritual journey/beliefs.  confirmed Patient's Synagogue Affiliation. Patient processed feeling about current hospitalization. Patient expressed gratitude for 's visit. Assessment:  Patient does not have any Methodist/cultural needs that will affect patient's preferences in health care. There are no spiritual or Methodist issues which require intervention at this time. Plan:  Chaplains will continue to follow and will provide pastoral care on an as needed/requested basis.  recommends bedside caregivers page  on duty if patient shows signs of acute spiritual or emotional distress.  Fr.  [address]   [phone]

## 2020-02-26 NOTE — PROGRESS NOTES
Renal Progress Note  Follow up of ESRD     Assessment/Plan:  1. ESRD. Dialysis today and continue mwf. 3 liters uf today. 2. HTN, stable. Continue current regimen. 3. CAD with HFrEF. Compensated. 4. Anemia of ESRD. H/H is above goal. No need for beth at this time. 5. Secondary hyperparathyroidism of ESRD. Continue phos binder. 6. PAD, s/p rt bka with lt foot ischemia. Follows with [facility] vascular. Subjective:  Seen on dialysis. Patient complaints off: No complaints. No SOB at rest, no chest pain, nausea or vomiting. States appetite is better.        Patient Active Problem List   Diagnosis Code    Acute blood loss anemia D62    HTN (hypertension) I10    HLD (hyperlipidemia) E78.5    Type II diabetes mellitus (HCC) E11.9    GI bleed K92.2    Dementia ([facility] [address] F03.90    Chest pain R07.9    Elevated troponin R79.89    ESRD on hemodialysis ([facility] [address] N18.6, Z99.2    History of CVA (cerebrovascular accident) Z80.78    CAD (coronary artery disease) W74.79    Systolic CHF, chronic (HCC) I50.22       Current Facility-Administered Medications   Medication Dose Route Frequency Provider Last Rate Last Dose    amiodarone (CORDARONE) tablet 200 mg  200 mg Oral DAILY Aldo Myers DO   200 mg at 02/26/20 0859    atorvastatin (LIPITOR) tablet 80 mg  80 mg Oral DAILY Vivi Rene DO   80 mg at 02/26/20 0844    lisinopril (PRINIVIL, ZESTRIL) tablet 2.5 mg  2.5 mg Oral DAILY Lanette Myers DO   Stopped at 02/26/20 0900    metoprolol succinate (TOPROL-XL) XL tablet 25 mg  25 mg Oral BID Vivi Rene DO   Stopped at 02/26/20 0900    sevelamer carbonate (RENVELA) tab 800 mg  800 mg Oral TID WITH MEALS Aldo Myers DO   800 mg at 02/26/20 1232    ondansetron (ZOFRAN) injection 4 mg  4 mg IntraVENous Q4H PRN Aldo Myers DO        glucose chewable tablet 16 g  4 Tab Oral PRN Aldo Myers DO        glucagon (GLUCAGEN) injection 1 mg  1 mg IntraMUSCular PRN Vivi Rene DO  dextrose 10 % infusion 125-250 mL  125-250 mL IntraVENous PRN Laquetta Izaiah, DO        acetaminophen (TYLENOL) tablet 650 mg  650 mg Oral Q6H PRN Idania Izaiah, DO        aspirin chewable tablet 81 mg  81 mg Oral DAILY Aldo Myers, DO   81 mg at 02/26/20 0844    albuterol-ipratropium (DUO-NEB) 2.5 MG-0.5 MG/3 ML  3 mL Nebulization Q4H PRN Idania Izaiah, DO        melatonin tablet 12 mg  12 mg Oral QHS PRN Laleanna Izaiah, DO        docusate sodium (COLACE) capsule 100 mg  100 mg Oral BID PRN Idania Izaiah, DO        oxyCODONE-acetaminophen (PERCOCET) 5-325 mg per tablet 1 Tab  1 Tab Oral Q6H PRN Idania Izaiah, DO   1 Tab at 02/25/20 1702    nitroglycerin (NITROBID) 2 % ointment 0.5 Inch  0.5 Inch Topical Q6H PRN Marca Israel, DO   0.5 Inch at 02/24/20 0310    insulin lispro (HUMALOG) injection   SubCUTAneous QID AFTER MEALS Brayden Noland MD   Stopped at 02/24/20 2000    nitroglycerin (NITROSTAT) tablet 0.4 mg  0.4 mg SubLINGual PRN Tolu PÉREZ DO                 Objective:  Vitals:    02/26/20 0354 02/26/20 0736 02/26/20 1141 02/26/20 1606   BP: 119/72 112/69 118/75 130/85   Pulse: 71 64 69 65   Resp: 17 16 18 18   Temp: 97.9 °F (36.6 °C) 97.5 °F (36.4 °C) 97.8 °F (36.6 °C) 97.8 °F (36.6 °C)   TempSrc:    Oral   SpO2: 99% 97% 100%    Weight:       Height:         . Intake/Output Summary (Last 24 hours) at 2/26/2020 1733  Last data filed at 2/26/2020 1426  Gross per 24 hour   Intake 840 ml   Output 370 ml   Net 470 ml       Admission weight:Weight: 61.4 kg (135 lb 4.8 oz) (02/24/20 1549)    Last Weight Metrics:  Weight Loss Metrics 2/25/2020 1/7/2020 12/30/2019 12/24/2019 11/27/2017 8/28/2017 2/18/2010   Today's Wt 134 lb 113 lb 11.1 oz 134 lb 134 lb 167 lb 166 lb 0.1 oz 173 lb   BMI 24.51 kg/m2 19.52 kg/m2 23.74 kg/m2 23.74 kg/m2 29.58 kg/m2 29.41 kg/m2 29.68 kg/m2            Physical Exam:     General: No acute distress. Neck: no jvd.    LUNGS:Clear to Auscultation, No reales, rhonchi or wheezes. CVS EXM: S1, S2, no murmur, rub or gallop. Abdomen: Soft, Non Tender, non distended. Lower Extremities: rt bka. No le edema. Access: lt arm avf.        Labs:    CBC w/Diff Recent Labs     02/25/20 0425 02/23/20  2345   WBC 8.4 7.4   RBC 3.98* 4.39*   HGB 11.5* 12.6*   HCT 36.8 41.0    189   GRANS 73 74*   LYMPH 14* 14*   EOS 3 3        Chemistry Recent Labs     02/25/20 0425 02/23/20  2345   GLU 81 85    140   K 4.8 4.3    103   CO2 27 28   BUN 49* 65*   CREA 9.28* 11.70*   CA 9.0 8.3*   AGAP 8 9   BUCR 5* 6*   AP 99 119*   TP 7.7 8.1   ALB 3.3* 3.8   GLOB 4.4* 4.3*   AGRAT 0.8 0.9          No results found for: IRON, FE, TIBC, IBCT, PSAT, FERR   Lab Results   Component Value Date/Time    Calcium 9.0 02/25/2020 04:25 AM    CALCIUM,IONIZED 4.20 (L) 08/28/2017 10:40 AM          Nan Denis M.D  [facility]  Office [phone]  Pager [phone]

## 2020-02-26 NOTE — CDMP QUERY
Pt admitted with chest pain. Angina was noted in the ED Provider Notes; however, it is not noted in subsequent documentation If possible, please document in progress notes and d/c summary if you are evaluating and /or treating any of the following: 
 
=> Chest pain due to CAD with unstable angina 
=> Chest pain due to NSTEMI 
=> Atypical chest pain          
=> Chest pain due to GERD 
=> Chest pain due to other (please specify)     
=> Other explanation of clinical findings. 
=> Unable to determine (no explanation for clinical findings). The medical record reflects the following: 
 
Risk Factors: CHF, ESRD, HTN, DM Clinical Indicators:  
- trop: 0.13,  0.14,  0.17 Treatment: serial troponins, receiving Toprol, Lisinopril Thank you, [id number] [facility], [facility] 
[phone]

## 2020-02-27 ENCOUNTER — HOME HEALTH ADMISSION (OUTPATIENT)
Dept: HOME HEALTH SERVICES | Facility: HOME HEALTH | Age: 60
End: 2020-02-27

## 2020-02-27 VITALS
WEIGHT: 130.6 LBS | BODY MASS INDEX: 24.03 KG/M2 | DIASTOLIC BLOOD PRESSURE: 81 MMHG | HEIGHT: 62 IN | HEART RATE: 73 BPM | SYSTOLIC BLOOD PRESSURE: 134 MMHG | TEMPERATURE: 97.8 F | OXYGEN SATURATION: 100 % | RESPIRATION RATE: 16 BRPM

## 2020-02-27 LAB
GLUCOSE BLD STRIP.AUTO-MCNC: 105 MG/DL (ref 70–110)
GLUCOSE BLD STRIP.AUTO-MCNC: 106 MG/DL (ref 70–110)

## 2020-02-27 PROCEDURE — 82962 GLUCOSE BLOOD TEST: CPT

## 2020-02-27 PROCEDURE — 74011250637 HC RX REV CODE- 250/637: Performed by: INTERNAL MEDICINE

## 2020-02-27 RX ADMIN — AMIODARONE HYDROCHLORIDE 200 MG: 200 TABLET ORAL at 09:10

## 2020-02-27 RX ADMIN — SEVELAMER CARBONATE 800 MG: 800 TABLET, FILM COATED ORAL at 09:10

## 2020-02-27 RX ADMIN — ATORVASTATIN CALCIUM 80 MG: 80 TABLET, FILM COATED ORAL at 09:10

## 2020-02-27 RX ADMIN — SEVELAMER CARBONATE 800 MG: 800 TABLET, FILM COATED ORAL at 13:28

## 2020-02-27 RX ADMIN — OXYCODONE HYDROCHLORIDE AND ACETAMINOPHEN 1 TABLET: 5; 325 TABLET ORAL at 09:09

## 2020-02-27 RX ADMIN — ASPIRIN 81 MG CHEWABLE TABLET 81 MG: 81 TABLET CHEWABLE at 09:11

## 2020-02-27 RX ADMIN — LISINOPRIL 2.5 MG: 5 TABLET ORAL at 09:10

## 2020-02-27 RX ADMIN — METOPROLOL SUCCINATE 25 MG: 25 TABLET, EXTENDED RELEASE ORAL at 09:11

## 2020-02-27 NOTE — PROGRESS NOTES
Problem: Pain  Goal: *Control of Pain  Outcome: Progressing Towards Goal     Problem: Falls - Risk of  Goal: *Absence of Falls  Description  Document Marcus Emery Fall Risk and appropriate interventions in the flowsheet. Outcome: Progressing Towards Goal  Note: Fall Risk Interventions:  Mobility Interventions: Bed/chair exit alarm    Mentation Interventions: Adequate sleep, hydration, pain control, Bed/chair exit alarm, Door open when patient unattended, More frequent rounding, Reorient patient    Medication Interventions: Bed/chair exit alarm, Evaluate medications/consider consulting pharmacy, Teach patient to arise slowly    Elimination Interventions: Call light in reach, Bed/chair exit alarm, Patient to call for help with toileting needs, Toileting schedule/hourly rounds              Problem: Pressure Injury - Risk of  Goal: *Prevention of pressure injury  Description  Document Enrique Scale and appropriate interventions in the flowsheet.   Outcome: Progressing Towards Goal  Note: Pressure Injury Interventions:  Sensory Interventions: Check visual cues for pain, Keep linens dry and wrinkle-free         Activity Interventions: Increase time out of bed    Mobility Interventions: HOB 30 degrees or less    Nutrition Interventions: Document food/fluid/supplement intake    Friction and Shear Interventions: HOB 30 degrees or less

## 2020-02-27 NOTE — PROGRESS NOTES
Problem: Discharge Planning  Goal: *Discharge to safe environment  Outcome: Resolved/Met    Home with hh [facility] and personal care already established    Referral placed in [facility] for [facility] called to [address] Management Interventions  PCP Verified by CM: Yes(Dr Peterson last seen a year ago)  Palliative Care Criteria Met (RRAT>21 & CHF Dx)?: No  Mode of Transport at Discharge: Other (see comment)(medicaid transport)  Transition of Care Consult (CM Consult): Home Health(BS)  [facility] - INPATIENT: Yes  Physical Therapy Consult: No  Occupational Therapy Consult: No  Speech Therapy Consult: No  Current Support Network: (SON)  Confirm Follow Up Transport: Family  The Plan for Transition of Care is Related to the Following Treatment Goals : Home with ([facility])  The Patient and/or Patient Representative was Provided with a Choice of Provider and Agrees with the Discharge Plan?: Yes  Name of the Patient Representative Who was Provided with a Choice of Provider and Agrees with the Discharge Plan: Julio Leonardo  [place] of Choice List was Provided with Basic Dialogue that Supports the Patient's Individualized Plan of Care/Goals, Treatment Preferences and Shares the Quality Data Associated with the Providers?: No   Resource Information Provided?: No(pt never served in [employer])  Discharge Location  Discharge Placement: Home with home health([facility] and [facility])     Left message for son to return call    Spoke with Khurram patient to be  by family after 3 pm today after son gets off work. Family stated that patient gets to dialysis MWF with transport from logistics care.     Spoke with Maria Eugenia Rogers patient's son he will be here at 5:30pm

## 2020-02-27 NOTE — ROUTINE PROCESS
0720-- Bedside, Verbal and Written shift change report received by Natalie Daina (oncoming nurse) by Alexandro Umana (offgoing nurse). Allergy band placed on pt's wrist. Report included the following information SBAR, Kardex, Intake/Output, MAR and Recent Results. 0823-Am assessment completed. 0252-- PM  medications administered, pt tolerated with ease, will continue to monitor. 1200--  Shift reassessment, pt condition unchanged, will continue to monitor. 1600-no change in condition, no distress noted. 1800-discharged home via wheel chair with son, discharge instructions given to son, verbalized understanding.

## 2020-02-27 NOTE — ANCILLARY DISCHARGE INSTRUCTIONS
Core Measure Patient, RRAT Score is 26, Patient has transitional care follow up with 3/06/2020 at 12:00 pm.

## 2020-02-27 NOTE — PROGRESS NOTES
Patient arrived in the unit from dialysis accompanied by this RN and CNA. No distress noted. 0000> No distress noted. 0400>  Wound care completed. Tolerated well. Bedside and Verbal shift change report given to COSME Lawton (oncoming nurse) by Daxa Avila RN (offgoing nurse). Report included the following information SBAR, Kardex, Intake/Output, MAR and Recent Results.

## 2020-02-27 NOTE — ANCILLARY DISCHARGE INSTRUCTIONS
Patient and/or next of kin has been given the [facility] Important Message From Medicare About Your Rights\" letter and all questions were answered.

## 2020-02-27 NOTE — PROGRESS NOTES
Problem: Discharge Planning  Goal: *Discharge to safe environment  Outcome: Progressing Towards Goal  Plan is Home with home health  With  personal care already established. Spoke with son Tank Davalos He stated that he would transport patient back home and continue with the personal care provided with [facility]. He will use [facility] as well post discharge. Son also express that we can speak with Anita Betts as he is a close friend that help him with his father.

## 2020-02-27 NOTE — DISCHARGE INSTRUCTIONS
DISCHARGE SUMMARY from Nurse    PATIENT INSTRUCTIONS:    After general anesthesia or intravenous sedation, for 24 hours or while taking prescription Narcotics:  · Limit your activities  · Do not drive and operate hazardous machinery  · Do not make important personal or business decisions  · Do  not drink alcoholic beverages  · If you have not urinated within 8 hours after discharge, please contact your surgeon on call. Report the following to your surgeon:  · Excessive pain, swelling, redness or odor of or around the surgical area  · Temperature over 100.5  · Nausea and vomiting lasting longer than 4 hours or if unable to take medications  · Any signs of decreased circulation or nerve impairment to extremity: change in color, persistent  numbness, tingling, coldness or increase pain  · Any questions    What to do at Home:  Recommended activity: Activity as tolerated    If you experience any of the following symptoms worsening shortness of breath, palpitations, or chest pain, please follow up with primary care physician. *  Please give a list of your current medications to your Primary Care Provider. *  Please update this list whenever your medications are discontinued, doses are      changed, or new medications (including over-the-counter products) are added. *  Please carry medication information at all times in case of emergency situations. These are general instructions for a healthy lifestyle:    No smoking/ No tobacco products/ Avoid exposure to second hand smoke  Surgeon General's Warning:  Quitting smoking now greatly reduces serious risk to your health.     Obesity, smoking, and sedentary lifestyle greatly increases your risk for illness    A healthy diet, regular physical exercise & weight monitoring are important for maintaining a healthy lifestyle    You may be retaining fluid if you have a history of heart failure or if you experience any of the following symptoms:  Weight gain of 3 pounds or more overnight or 5 pounds in a week, increased swelling in our hands or feet or shortness of breath while lying flat in bed. Please call your doctor as soon as you notice any of these symptoms; do not wait until your next office visit. The discharge information has been reviewed with the patient and caregiver. The patient and caregiver verbalized understanding. Discharge medications reviewed with the patient and caregiver and appropriate educational materials and side effects teaching were provided. Patient armband removed and shredded.

## 2020-02-27 NOTE — PROGRESS NOTES
Discharge instructions provided to pt on behalf of primary nurse [facility]. No new prescriptions ordered. Follow up appointment pointed out to pt. Peripheral iv and telemetry monitor removed. Pt is dressed and awaiting for Khurram to pick him up.

## 2020-02-27 NOTE — PROGRESS NOTES
Renal Progress Note  Follow up of ESRD     Assessment/Plan:  1. ESRD. Dialysis tomorrow at op unit and continue mwf. 2. HTN, stable. Continue current regimen. 3. CAD with HFrEF. Compensated. 4. Anemia of ESRD. H/H is above goal. No need for beth at this time. 5. Secondary hyperparathyroidism of ESRD. Continue phos binder. 6. PAD, s/p rt bka with lt foot ischemia. Follows with [facility] vascular. Subjective:    Patient complaints off: No complaints. No SOB at rest, no chest pain, nausea or vomiting. States appetite is better. Going home.        Patient Active Problem List   Diagnosis Code    Acute blood loss anemia D62    HTN (hypertension) I10    HLD (hyperlipidemia) E78.5    Type II diabetes mellitus ([facility] [address] E11.9    GI bleed K92.2    Dementia ([facility] [address] F03.90    Chest pain R07.9    Elevated troponin R79.89    ESRD on hemodialysis ([facility] [address] N18.6, Z99.2    History of CVA (cerebrovascular accident) Z80.78    CAD (coronary artery disease) C21.25    Systolic CHF, chronic (HCC) I50.22       Current Facility-Administered Medications   Medication Dose Route Frequency Provider Last Rate Last Dose    amiodarone (CORDARONE) tablet 200 mg  200 mg Oral DAILY Aldo Myers DO   200 mg at 02/27/20 0910    atorvastatin (LIPITOR) tablet 80 mg  80 mg Oral DAILY Rogers Linen, DO   80 mg at 02/27/20 0910    lisinopril (PRINIVIL, ZESTRIL) tablet 2.5 mg  2.5 mg Oral DAILY Aldo Myers DO   2.5 mg at 02/27/20 0910    metoprolol succinate (TOPROL-XL) XL tablet 25 mg  25 mg Oral BID Rogers Linen, DO   25 mg at 02/27/20 0911    sevelamer carbonate (RENVELA) tab 800 mg  800 mg Oral TID WITH MEALS Aldo Myers DO   800 mg at 02/27/20 1328    ondansetron (ZOFRAN) injection 4 mg  4 mg IntraVENous Q4H PRN Aldo Myers DO        glucose chewable tablet 16 g  4 Tab Oral PRN Rogers Sharondan, DO        glucagon (GLUCAGEN) injection 1 mg  1 mg IntraMUSCular PRN Figueroa Baltazarech E, DO        dextrose 10 % infusion 125-250 mL  125-250 mL IntraVENous PRN Leida Dayhoff, DO        acetaminophen (TYLENOL) tablet 650 mg  650 mg Oral Q6H PRN Leida Dayhoff, DO        aspirin chewable tablet 81 mg  81 mg Oral DAILY Aldo Myers, DO   81 mg at 02/27/20 0911    albuterol-ipratropium (DUO-NEB) 2.5 MG-0.5 MG/3 ML  3 mL Nebulization Q4H PRN Aldo Myers,         melatonin tablet 12 mg  12 mg Oral QHS PRN Leida Dayhoff, DO        docusate sodium (COLACE) capsule 100 mg  100 mg Oral BID PRN Leida Dayhoff, DO        oxyCODONE-acetaminophen (PERCOCET) 5-325 mg per tablet 1 Tab  1 Tab Oral Q6H PRN Leida Dayhoff, DO   1 Tab at 02/27/20 0909    nitroglycerin (NITROBID) 2 % ointment 0.5 Inch  0.5 Inch Topical Q6H PRN Leida Dayhoff, DO   0.5 Inch at 02/24/20 0310    insulin lispro (HUMALOG) injection   SubCUTAneous QID AFTER MEALS Ruiz Martin MD   Stopped at 02/24/20 2000    nitroglycerin (NITROSTAT) tablet 0.4 mg  0.4 mg SubLINGual PRN Rolando PÉREZ DO                 Objective:  Vitals:    02/27/20 0445 02/27/20 0749 02/27/20 1143 02/27/20 1449   BP: 126/75 129/79 133/77 134/81   Pulse: 76 79 74 73   Resp: 16 16 16 16   Temp: 98.4 °F (36.9 °C) 97.9 °F (36.6 °C) 98.1 °F (36.7 °C) 97.8 °F (36.6 °C)   TempSrc:       SpO2: 99% 94% 99% 100%   Weight:       Height:         . Intake/Output Summary (Last 24 hours) at 2/27/2020 1601  Last data filed at 2/26/2020 2000  Gross per 24 hour   Intake    Output 3000 ml   Net -3000 ml       Admission weight:Weight: 61.4 kg (135 lb 4.8 oz) (02/24/20 1549)    Last Weight Metrics:  Weight Loss Metrics 2/27/2020 1/7/2020 12/30/2019 12/24/2019 11/27/2017 8/28/2017 2/18/2010   Today's Wt 130 lb 9.6 oz 113 lb 11.1 oz 134 lb 134 lb 167 lb 166 lb 0.1 oz 173 lb   BMI 23.89 kg/m2 19.52 kg/m2 23.74 kg/m2 23.74 kg/m2 29.58 kg/m2 29.41 kg/m2 29.68 kg/m2            Physical Exam:     General: No acute distress. Neck: no jvd.    LUNGS:Clear to Auscultation, No reales, rhonchi or wheezes. CVS EXM: S1, S2, no murmur, rub or gallop. Abdomen: Soft, Non Tender, non distended. Lower Extremities: rt bka. No le edema. Access: lt arm avf.        Labs:    CBC w/Diff Recent Labs     02/25/20  0425   WBC 8.4   RBC 3.98*   HGB 11.5*   HCT 36.8      GRANS 73   LYMPH 14*   EOS 3        Chemistry Recent Labs     02/25/20  0425   GLU 81      K 4.8      CO2 27   BUN 49*   CREA 9.28*   CA 9.0   AGAP 8   BUCR 5*   AP 99   TP 7.7   ALB 3.3*   GLOB 4.4*   AGRAT 0.8          No results found for: IRON, FE, TIBC, IBCT, PSAT, FERR   Lab Results   Component Value Date/Time    Calcium 9.0 02/25/2020 04:25 AM    CALCIUM,IONIZED 4.20 (L) 08/28/2017 10:40 AM          Petra Malik M.D  [facility]  Office [phone]  Pager [phone]

## 2020-03-01 ENCOUNTER — HOME CARE VISIT (OUTPATIENT)
Dept: HOME HEALTH SERVICES | Facility: HOME HEALTH | Age: 60
End: 2020-03-01

## 2020-03-04 NOTE — DISCHARGE SUMMARY
[address]    Name:  Ozzy Madsen  MR#:   [id number]  :  1960  ACCOUNT #:  [de-identified]  ADMIT DATE:  2020  DISCHARGE DATE:  2020    ADMITTING DIAGNOSES:  Chest pain in the setting of known coronary artery disease, end-stage renal disease. HISTORY OF PRESENT ILLNESS:  The patient is a 40-year-old male who presented to the emergency department with shortness of breath and chest pain. This had been going on for one day prior to admission. His shortness of breath was associated with exercise. In the emergency department, he was found to have fluid overload and he was admitted for ongoing management. HOSPITAL COURSE:  Nephrology consultation was obtained to manage the patient's end-stage renal disease. He continued with dialysis throughout hospitalization. The patient's shortness of breath improved sequentially throughout hospitalization. There was no evidence of acute coronary syndrome via cardiac enzymes, which remained stable. His chest pain resolved. The patient also has known stable dry gangrene of his left foot. He has a podiatrist that he follows with this. The patient continued to improve and by 2020, he was considered to be ready for discharge. He was discharged home. DISCHARGE DIAGNOSES:  1. Chest pain in the setting of coronary artery disease and end-stage renal disease, improved. 2.  Fluid overload, improved. 3.  Coronary artery disease. 4.  End-stage renal disease. 5.  Stable dry gangrene of the left foot. 6.  Cerebrovascular disease. 7.  Acute on chronic systolic congestive heart failure with a left ventricular ejection fraction of 20%. CONDITION ON DISCHARGE:  Improved. ACTIVITY:  Ad iveth. DISCHARGE INSTRUCTIONS:  Followup is with his primary care provider in 1 week, the patient will arrange. Followup is also with his podiatrist in 1 week, the patient will arrange. DISCHARGE MEDICATIONS:  1.   Amiodarone 200 mg by mouth daily. 2.  Lipitor 80 mg by mouth daily. 3.  Thorazine 10 mg by mouth three times daily. 4.  Amaryl 1 mg by mouth daily. 5.  Lisinopril 2.5 mg by mouth daily. 6.  Toprol-XL 25 mg by mouth two times daily. 7.  Nitroglycerin 0.4 mg sublingually every 5 minutes as needed for chest pain. 8.  Renvela 800 mg tablets 4 tablets by mouth three times daily. 9.  TriCor 145 mg by mouth daily. 10.  Vitamin D3 one tablet by mouth daily. DIET:  Renal.    DISPOSITION:  Home.       Timmy Goldsmith MD    Total Discharge time 35 minutes      [id number]  D:  03/03/2020 7:14  T:  03/04/2020 6:23  JOB #:  [id number]  CC:  Shruti Lopez MD

## 2020-07-22 ENCOUNTER — HOSPITAL ENCOUNTER (OUTPATIENT)
Dept: LAB | Age: 60
Discharge: HOME | End: 2020-07-22

## 2020-07-22 LAB
ANION GAP SERPL CALC-SCNC: 21 MMOL/L (ref 3–18)
BASOPHILS # BLD: 0 K/UL (ref 0–0.1)
BASOPHILS NFR BLD: 1 % (ref 0–2)
BUN SERPL-MCNC: 91 MG/DL (ref 7–18)
BUN/CREAT SERPL: 6 (ref 12–20)
CALCIUM SERPL-MCNC: 8.8 MG/DL (ref 8.5–10.1)
CHLORIDE SERPL-SCNC: 95 MMOL/L (ref 100–111)
CO2 SERPL-SCNC: 19 MMOL/L (ref 21–32)
CREAT SERPL-MCNC: 16.5 MG/DL (ref 0.6–1.3)
D DIMER PPP FEU-MCNC: 1.25 UG/ML(FEU)
DIFFERENTIAL METHOD BLD: ABNORMAL
EOSINOPHIL # BLD: 0.2 K/UL (ref 0–0.4)
EOSINOPHIL NFR BLD: 3 % (ref 0–5)
ERYTHROCYTE [DISTWIDTH] IN BLOOD BY AUTOMATED COUNT: 17.7 % (ref 11.6–14.5)
GLUCOSE SERPL-MCNC: 89 MG/DL (ref 74–99)
HCT VFR BLD AUTO: 33.3 % (ref 36–48)
HGB BLD-MCNC: 11.3 G/DL (ref 13–16)
LYMPHOCYTES # BLD: 0.6 K/UL (ref 0.9–3.6)
LYMPHOCYTES NFR BLD: 12 % (ref 21–52)
MCH RBC QN AUTO: 29.4 PG (ref 24–34)
MCHC RBC AUTO-ENTMCNC: 33.9 G/DL (ref 31–37)
MCV RBC AUTO: 86.7 FL (ref 74–97)
MONOCYTES # BLD: 0.8 K/UL (ref 0.05–1.2)
MONOCYTES NFR BLD: 15 % (ref 3–10)
NEUTS SEG # BLD: 3.7 K/UL (ref 1.8–8)
NEUTS SEG NFR BLD: 69 % (ref 40–73)
PLATELET # BLD AUTO: 193 K/UL (ref 135–420)
PMV BLD AUTO: 10.2 FL (ref 9.2–11.8)
POTASSIUM SERPL-SCNC: 6.4 MMOL/L (ref 3.5–5.5)
RBC # BLD AUTO: 3.84 M/UL (ref 4.7–5.5)
SODIUM SERPL-SCNC: 135 MMOL/L (ref 136–145)
WBC # BLD AUTO: 5.3 K/UL (ref 4.6–13.2)

## 2020-07-22 PROCEDURE — 85379 FIBRIN DEGRADATION QUANT: CPT

## 2020-07-22 PROCEDURE — 85025 COMPLETE CBC W/AUTO DIFF WBC: CPT

## 2020-07-22 PROCEDURE — 80048 BASIC METABOLIC PNL TOTAL CA: CPT

## 2020-09-09 ENCOUNTER — HOSPITAL ENCOUNTER (OUTPATIENT)
Dept: LAB | Age: 60
Discharge: HOME | End: 2020-09-09

## 2020-09-09 LAB
ANION GAP SERPL CALC-SCNC: 9 MMOL/L (ref 3–18)
BASOPHILS # BLD: 0 K/UL (ref 0–0.06)
BASOPHILS NFR BLD: 0 % (ref 0–3)
BUN SERPL-MCNC: 44 MG/DL (ref 7–18)
BUN/CREAT SERPL: 5 (ref 12–20)
CALCIUM SERPL-MCNC: 10.5 MG/DL (ref 8.5–10.1)
CHLORIDE SERPL-SCNC: 96 MMOL/L (ref 100–111)
CO2 SERPL-SCNC: 31 MMOL/L (ref 21–32)
CREAT SERPL-MCNC: 8.57 MG/DL (ref 0.6–1.3)
DIFFERENTIAL METHOD BLD: ABNORMAL
EOSINOPHIL # BLD: 0.1 K/UL (ref 0–0.4)
EOSINOPHIL NFR BLD: 1 % (ref 0–5)
ERYTHROCYTE [DISTWIDTH] IN BLOOD BY AUTOMATED COUNT: 14.9 % (ref 11.6–14.5)
GLUCOSE SERPL-MCNC: 118 MG/DL (ref 74–99)
HCT VFR BLD AUTO: 31.2 % (ref 36–48)
HGB BLD-MCNC: 9.9 G/DL (ref 13–16)
LYMPHOCYTES # BLD: 1.3 K/UL (ref 0.8–3.5)
LYMPHOCYTES NFR BLD: 9 % (ref 20–51)
MCH RBC QN AUTO: 28.3 PG (ref 24–34)
MCHC RBC AUTO-ENTMCNC: 31.7 G/DL (ref 31–37)
MCV RBC AUTO: 89.1 FL (ref 74–97)
MONOCYTES # BLD: 1.8 K/UL (ref 0–1)
MONOCYTES NFR BLD: 12 % (ref 2–9)
NEUTS SEG # BLD: 11.4 K/UL (ref 1.8–8)
NEUTS SEG NFR BLD: 78 % (ref 42–75)
PLATELET # BLD AUTO: 430 K/UL (ref 135–420)
PLATELET COMMENTS,PCOM: ABNORMAL
PMV BLD AUTO: 9.7 FL (ref 9.2–11.8)
POTASSIUM SERPL-SCNC: 5 MMOL/L (ref 3.5–5.5)
RBC # BLD AUTO: 3.5 M/UL (ref 4.7–5.5)
RBC MORPH BLD: ABNORMAL
SODIUM SERPL-SCNC: 136 MMOL/L (ref 136–145)
WBC # BLD AUTO: 14.6 K/UL (ref 4.6–13.2)

## 2020-09-09 PROCEDURE — 80048 BASIC METABOLIC PNL TOTAL CA: CPT

## 2020-09-09 PROCEDURE — 85025 COMPLETE CBC W/AUTO DIFF WBC: CPT

## 2020-09-28 ENCOUNTER — HOSPITAL ENCOUNTER (OUTPATIENT)
Dept: LAB | Age: 60
Discharge: HOME | End: 2020-09-28

## 2020-09-28 LAB
ANION GAP SERPL CALC-SCNC: 9 MMOL/L (ref 3–18)
BUN SERPL-MCNC: 44 MG/DL (ref 7–18)
BUN/CREAT SERPL: 6 (ref 12–20)
CALCIUM SERPL-MCNC: 9.9 MG/DL (ref 8.5–10.1)
CHLORIDE SERPL-SCNC: 95 MMOL/L (ref 100–111)
CO2 SERPL-SCNC: 29 MMOL/L (ref 21–32)
CREAT SERPL-MCNC: 7.56 MG/DL (ref 0.6–1.3)
GLUCOSE SERPL-MCNC: 77 MG/DL (ref 74–99)
POTASSIUM SERPL-SCNC: 4.3 MMOL/L (ref 3.5–5.5)
SODIUM SERPL-SCNC: 133 MMOL/L (ref 136–145)

## 2020-09-28 PROCEDURE — 80048 BASIC METABOLIC PNL TOTAL CA: CPT

## 2021-01-23 ENCOUNTER — HOSPITAL ENCOUNTER (OUTPATIENT)
Dept: LAB | Age: 61
Discharge: HOME | End: 2021-01-23

## 2021-01-23 LAB
ANION GAP SERPL CALC-SCNC: 7 MMOL/L (ref 3–18)
BASOPHILS # BLD: 0 K/UL (ref 0–0.1)
BASOPHILS NFR BLD: 0 % (ref 0–2)
BUN SERPL-MCNC: 44 MG/DL (ref 7–18)
BUN/CREAT SERPL: 7 (ref 12–20)
CALCIUM SERPL-MCNC: 8.5 MG/DL (ref 8.5–10.1)
CHLORIDE SERPL-SCNC: 99 MMOL/L (ref 100–111)
CO2 SERPL-SCNC: 33 MMOL/L (ref 21–32)
CREAT SERPL-MCNC: 5.92 MG/DL (ref 0.6–1.3)
DIFFERENTIAL METHOD BLD: ABNORMAL
EOSINOPHIL # BLD: 0.2 K/UL (ref 0–0.4)
EOSINOPHIL NFR BLD: 4 % (ref 0–5)
ERYTHROCYTE [DISTWIDTH] IN BLOOD BY AUTOMATED COUNT: 20.9 % (ref 11.6–14.5)
GLUCOSE SERPL-MCNC: 85 MG/DL (ref 74–99)
HCT VFR BLD AUTO: 28.3 % (ref 36–48)
HGB BLD-MCNC: 8.8 G/DL (ref 13–16)
LYMPHOCYTES # BLD: 1.2 K/UL (ref 0.9–3.6)
LYMPHOCYTES NFR BLD: 22 % (ref 21–52)
MCH RBC QN AUTO: 28.9 PG (ref 24–34)
MCHC RBC AUTO-ENTMCNC: 31.1 G/DL (ref 31–37)
MCV RBC AUTO: 93.1 FL (ref 74–97)
MONOCYTES # BLD: 0.3 K/UL (ref 0.05–1.2)
MONOCYTES NFR BLD: 5 % (ref 3–10)
NEUTS SEG # BLD: 3.8 K/UL (ref 1.8–8)
NEUTS SEG NFR BLD: 69 % (ref 40–73)
PLATELET # BLD AUTO: 259 K/UL (ref 135–420)
PLATELET COMMENTS,PCOM: ABNORMAL
PMV BLD AUTO: 10.1 FL (ref 9.2–11.8)
POTASSIUM SERPL-SCNC: 4.8 MMOL/L (ref 3.5–5.5)
RBC # BLD AUTO: 3.04 M/UL (ref 4.7–5.5)
RBC MORPH BLD: ABNORMAL
SODIUM SERPL-SCNC: 139 MMOL/L (ref 136–145)
WBC # BLD AUTO: 5.5 K/UL (ref 4.6–13.2)

## 2021-01-23 PROCEDURE — 80048 BASIC METABOLIC PNL TOTAL CA: CPT

## 2021-01-23 PROCEDURE — 85025 COMPLETE CBC W/AUTO DIFF WBC: CPT

## 2022-03-18 PROBLEM — E11.9 TYPE II DIABETES MELLITUS (HCC): Status: ACTIVE | Noted: 2020-01-01

## 2022-03-18 PROBLEM — I25.10 CAD (CORONARY ARTERY DISEASE): Status: ACTIVE | Noted: 2020-02-24

## 2022-03-18 PROBLEM — F03.90 DEMENTIA (HCC): Status: ACTIVE | Noted: 2020-01-05

## 2022-03-19 PROBLEM — N18.6 ESRD ON HEMODIALYSIS (HCC): Status: ACTIVE | Noted: 2020-02-24

## 2022-03-19 PROBLEM — I10 HTN (HYPERTENSION): Status: ACTIVE | Noted: 2020-01-01

## 2022-03-19 PROBLEM — Z99.2 ESRD ON HEMODIALYSIS (HCC): Status: ACTIVE | Noted: 2020-02-24

## 2022-03-19 PROBLEM — R77.8 ELEVATED TROPONIN: Status: ACTIVE | Noted: 2020-02-24

## 2022-03-19 PROBLEM — K92.2 GI BLEED: Status: ACTIVE | Noted: 2020-01-01

## 2022-03-19 PROBLEM — R07.9 CHEST PAIN: Status: ACTIVE | Noted: 2020-02-24

## 2022-03-20 PROBLEM — I50.22 SYSTOLIC CHF, CHRONIC (HCC): Status: ACTIVE | Noted: 2020-02-24

## 2022-03-20 PROBLEM — E78.5 HLD (HYPERLIPIDEMIA): Status: ACTIVE | Noted: 2020-01-01

## 2022-03-20 PROBLEM — Z86.73 HISTORY OF CVA (CEREBROVASCULAR ACCIDENT): Status: ACTIVE | Noted: 2020-02-24

## 2022-03-20 PROBLEM — D62 ACUTE BLOOD LOSS ANEMIA: Status: ACTIVE | Noted: 2020-01-01

## 2023-02-15 RX ORDER — FENOFIBRATE 145 MG/1
145 TABLET, COATED ORAL DAILY
COMMUNITY

## 2023-02-15 RX ORDER — NITROGLYCERIN 0.4 MG/1
TABLET SUBLINGUAL
COMMUNITY

## 2023-02-15 RX ORDER — AMIODARONE HYDROCHLORIDE 200 MG/1
200 TABLET ORAL DAILY
COMMUNITY

## 2023-02-15 RX ORDER — CHLORPROMAZINE HYDROCHLORIDE 10 MG/1
10 TABLET, FILM COATED ORAL 3 TIMES DAILY
COMMUNITY

## 2023-02-15 RX ORDER — SEVELAMER CARBONATE 800 MG/1
TABLET, FILM COATED ORAL
COMMUNITY

## 2023-02-15 RX ORDER — GLIMEPIRIDE 1 MG/1
1 TABLET ORAL
COMMUNITY

## 2023-02-15 RX ORDER — ATORVASTATIN CALCIUM 80 MG/1
80 TABLET, FILM COATED ORAL DAILY
COMMUNITY

## 2023-02-15 RX ORDER — LISINOPRIL 2.5 MG/1
2.5 TABLET ORAL DAILY
COMMUNITY

## 2023-02-15 RX ORDER — METOPROLOL SUCCINATE 25 MG/1
25 TABLET, EXTENDED RELEASE ORAL 2 TIMES DAILY
COMMUNITY

## 2023-08-20 NOTE — PROGRESS NOTES
Problem: Discharge Planning  Goal: *Discharge to safe environment  Outcome: Progressing Towards Goal     Problem: Pain  Goal: *Control of Pain  Outcome: Progressing Towards Goal  Goal: *PALLIATIVE CARE:  Alleviation of Pain  Outcome: Progressing Towards Goal     Problem: Patient Education: Go to Patient Education Activity  Goal: Patient/Family Education  Outcome: Progressing Towards Goal     Problem: Falls - Risk of  Goal: *Absence of Falls  Description  Document Monserrat Augustine Fall Risk and appropriate interventions in the flowsheet. Outcome: Progressing Towards Goal  Note: Fall Risk Interventions:       Mentation Interventions: Door open when patient unattended, Bed/chair exit alarm, Adequate sleep, hydration, pain control, More frequent rounding, Toileting rounds, Update white board    Medication Interventions: Evaluate medications/consider consulting pharmacy, Bed/chair exit alarm    Elimination Interventions: Call light in reach, Bed/chair exit alarm, Toileting schedule/hourly rounds, Urinal in reach              Problem: Patient Education: Go to Patient Education Activity  Goal: Patient/Family Education  Outcome: Progressing Towards Goal     Problem: Diabetes Self-Management  Goal: *Disease process and treatment process  Description  Define diabetes and identify own type of diabetes; list 3 options for treating diabetes. Outcome: Progressing Towards Goal  Goal: *Incorporating nutritional management into lifestyle  Description  Describe effect of type, amount and timing of food on blood glucose; list 3 methods for planning meals. Outcome: Progressing Towards Goal  Goal: *Incorporating physical activity into lifestyle  Description  State effect of exercise on blood glucose levels. Outcome: Progressing Towards Goal  Goal: *Developing strategies to promote health/change behavior  Description  Define the ABC's of diabetes; identify appropriate screenings, schedule and personal plan for screenings.   Outcome: Progressing Towards Goal  Goal: *Using medications safely  Description  State effect of diabetes medications on diabetes; name diabetes medication taking, action and side effects. Outcome: Progressing Towards Goal  Goal: *Monitoring blood glucose, interpreting and using results  Description  Identify recommended blood glucose targets  and personal targets. Outcome: Progressing Towards Goal  Goal: *Prevention, detection, treatment of acute complications  Description  List symptoms of hyper- and hypoglycemia; describe how to treat low blood sugar and actions for lowering  high blood glucose level. Outcome: Progressing Towards Goal  Goal: *Prevention, detection and treatment of chronic complications  Description  Define the natural course of diabetes and describe the relationship of blood glucose levels to long term complications of diabetes. Outcome: Progressing Towards Goal  Goal: *Developing strategies to address psychosocial issues  Description  Describe feelings about living with diabetes; identify support needed and support network  Outcome: Progressing Towards Goal  Goal: *Sick day guidelines  Outcome: Progressing Towards Goal  Goal: *Patient Specific Goal (EDIT GOAL, INSERT TEXT)  Outcome: Progressing Towards Goal     Problem: Patient Education: Go to Patient Education Activity  Goal: Patient/Family Education  Outcome: Progressing Towards Goal     Problem: Hypertension  Goal: *Blood pressure within specified parameters  Outcome: Progressing Towards Goal  Goal: *Fluid volume balance  Outcome: Progressing Towards Goal  Goal: *Labs within defined limits  Outcome: Progressing Towards Goal     Problem: Patient Education: Go to Patient Education Activity  Goal: Patient/Family Education  Outcome: Progressing Towards Goal     Problem: Pressure Injury - Risk of  Goal: *Prevention of pressure injury  Description  Document Enrique Scale and appropriate interventions in the flowsheet.   Outcome: Progressing Towards Anwar Goal  Note: Pressure Injury Interventions:  Sensory Interventions: Check visual cues for pain, Assess changes in LOC, Keep linens dry and wrinkle-free, Pressure redistribution bed/mattress (bed type)         Activity Interventions: Pressure redistribution bed/mattress(bed type)    Mobility Interventions: HOB 30 degrees or less, Pressure redistribution bed/mattress (bed type)    Nutrition Interventions: Document food/fluid/supplement intake, Discuss nutritional consult with provider, Offer support with meals,snacks and hydration    Friction and Shear Interventions: HOB 30 degrees or less                Problem: Patient Education: Go to Patient Education Activity  Goal: Patient/Family Education  Outcome: Progressing Towards Goal